# Patient Record
Sex: MALE | Race: WHITE | NOT HISPANIC OR LATINO | Employment: OTHER | URBAN - METROPOLITAN AREA
[De-identification: names, ages, dates, MRNs, and addresses within clinical notes are randomized per-mention and may not be internally consistent; named-entity substitution may affect disease eponyms.]

---

## 2023-03-05 ENCOUNTER — NURSING HOME VISIT (OUTPATIENT)
Dept: POST ACUTE CARE | Facility: EXTERNAL LOCATION | Age: 83
End: 2023-03-05
Payer: MEDICARE

## 2023-03-05 DIAGNOSIS — F33.41 RECURRENT MAJOR DEPRESSIVE DISORDER, IN PARTIAL REMISSION (CMS-HCC): ICD-10-CM

## 2023-03-05 DIAGNOSIS — G89.18 ACUTE POST-OPERATIVE PAIN: ICD-10-CM

## 2023-03-05 DIAGNOSIS — R53.81 PHYSICAL DECONDITIONING: ICD-10-CM

## 2023-03-05 DIAGNOSIS — K57.32 DIVERTICULITIS OF COLON: ICD-10-CM

## 2023-03-05 DIAGNOSIS — K31.6 GASTROCUTANEOUS FISTULA: Primary | ICD-10-CM

## 2023-03-05 DIAGNOSIS — N17.9 AKI (ACUTE KIDNEY INJURY) (CMS-HCC): ICD-10-CM

## 2023-03-05 DIAGNOSIS — Z78.9 ON TOTAL PARENTERAL NUTRITION (TPN): ICD-10-CM

## 2023-03-05 DIAGNOSIS — K65.1 INTRA-ABDOMINAL ABSCESS (MULTI): ICD-10-CM

## 2023-03-05 DIAGNOSIS — R53.83 FATIGUE, UNSPECIFIED TYPE: ICD-10-CM

## 2023-03-05 DIAGNOSIS — D72.825 BANDEMIA: ICD-10-CM

## 2023-03-05 PROCEDURE — 99310 SBSQ NF CARE HIGH MDM 45: CPT | Performed by: FAMILY MEDICINE

## 2023-03-05 NOTE — LETTER
Patient: Dakota Pugh  : 1940    Encounter Date: 2023    Acute visit    Problem List Items Addressed This Visit          Nervous    Acute post-operative pain     hx of recent gastric perforation status post repair, history of diverticulitis  - c/w fentaNYL Transdermal Patch 72 Hour 12 MCG/HR  - c/w oxycodone 5 mg PO every 6 hours as needed  - c/w Tylenol as needed            Digestive    Diverticulitis of colon    Intra-abdominal abscess (CMS/HCC)     Status post IR drain placement with ABDI drain; drain care as ordered  - leukocytosis stable continue to monitor  - per chart reviewed; CT A/P with resolution/decreased in fluid collection.  - If leukocytosis persistent, will check UA; trend wbc/temps for now given patient is known case abdominal abscess and gastrocutaneous fistula  - c/w Piperacillin Sodium-Tazobactam Sodium) 3 GM EVERY 6 HOURS  -resumed home med per discharge summary reconcile medications.  - OP follow up with surgery and ID  -Patient's wife requested palliative care following for pain control. Order placed a  -Lab Test(s): cbc, cmp, crp weekly while on zosyn (Fax Results To: facility attending and Dr Vasques @ [phone]  - Continue monitor         Gastrocutaneous fistula - Primary     Status post IR drain placement with ABDI drain; drain care as ordered  - leukocytosis stable continue to monitor  - per chart reviewed; CT A/P with resolution/decreased in fluid collection.  - If leukocytosis persistent, will check UA; trend wbc/temps for now given patient is known case abdominal abscess and gastrocutaneous fistula  - c/w Piperacillin Sodium-Tazobactam Sodium) 3 GM EVERY 6 HOURS  -resumed home med per discharge summary reconcile medications.  - OP follow up with surgery and ID  -Patient's wife requested palliative care following for pain control. Order placed a  -Lab Test(s): cbc, cmp, crp weekly while on zosyn (Fax Results To: facility attending and Dr Vasques @ [phone]  - Continue  monitor            Genitourinary    CINDI (acute kidney injury) (CMS/HCC)     hx of Fluid overload and Bilateral pleural effusions  -continue PO Lasix, wean O2 TO MAINTAIN O2SAT >90%            Hematologic    Elevated WBC count     hx of recent gastric perforation status post repair, history of diverticulitis  - c/w fentaNYL Transdermal Patch 72 Hour 12 MCG/HR  - c/w oxycodone 5 mg PO every 6 hours as needed  - c/w Tylenol as needed            Other    Fatigue    Physical deconditioning     OT/PT/ST         On total parenteral nutrition (TPN)     NPO weekly labs         Recurrent major depressive disorder, in partial remission (CMS/HCC)     C/w Citalopram GDR not recommended            #Physical deconditioning  - C/W PT/OT as tolerated  #Leukocytosis and fever; 2/2 abdominal abscess on ATB therapy. Will trend cbc weekly. Tylenol as needed  #Abdominal abscesses  #Gastrocutaneous fistula  #Status post IR drain placement with ABDI drain; drain care as ordered  - leukocytosis stable continue to monitor  - per chart reviewed; CT A/P with resolution/decreased in fluid collection.  - If leukocytosis persistent, will check UA; trend wbc/temps for now given patient is known case abdominal abscess and gastrocutaneous fistula  - c/w Piperacillin Sodium-Tazobactam Sodium) 3 GM EVERY 6 HOURS  -resumed home med per discharge summary reconcile medications.  - OP follow up with surgery and ID  -Patient's wife requested palliative care following for pain control. Order placed a  -Lab Test(s): cbc, cmp, crp weekly while on zosyn (Fax Results To: facility attending and Dr Vasques @ [phone]  - Continue monitor  #Abdominal pain  - hx of recent gastric perforation status post repair, history of diverticulitis  - c/w fentaNYL Transdermal Patch 72 Hour 12 MCG/HR  - c/w oxycodone 5 mg PO every 6 hours as needed  - c/w Tylenol as needed  #GERD  - C/W Pantoprazole 40 MG po DAILY  - C/W Zofran as needed for N/V  #CINDI  - hx of Fluid overload  and Bilateral pleural effusions  -continue PO Lasix, wean O2 TO MAINTAIN O2SAT >90%  #Malnutrition  - NPO status  - Continue TPN  #moisture associated dermatitis on buttock  - c/w Triad cream (or A&D ointment) in dime thickness daily to  irritated areas.  #HLD  - c/w Atorvastatin 10 mg PO daily  #Depression  - c/w Citalopram 10 mg PO daily  -reviewed of hospital record via EMR and reconciled medication in [facility] and EMR (d/c summary). Reviewed orders, medications, records, and pertinent labs/x-rays from [facility]. Reviewed VS, BS, O2, etc as per protocol.       Chief Complaint  This is an acute visit for leucocytosis and pain    History of Present Illness  A 82-year-old man with history of recent gastric perforation status post repair, history of diverticulitis, BPH, HLD, presented from acute rehab with increasing abdominal pain, N/V, CT A/P positive for abdominal fluid collections compatible with abscesses, gastrocutaneous fistula. Patient admitted, started on IV Zosyn, IR guided drains placed-culture showed mixed anaerobes/aerobes, beta-lactamase producers. PICC line placed and started on TPN to allow for bowel rest. Repeat CT with stable/decreased fluid collections, drains in proper position. Patient d/c to  for rehab and continue TPN.  On encounter; per nursing staff reported pain   Active Problems  Problems  · Abnormal fasting glucose (790.29) (R73.01)  · Abnormality of lung on chest x-ray (793.19) (R91.8)  · Acute post-operative pain (338.18) (G89.18)  · Age-related nuclear cataract of left eye (366.16) (H25.12)  · Age-related nuclear cataract of right eye (366.16) (H25.11)  · Anemia, unspecified type (285.9) (D64.9)  · Arthritis of shoulder region, right (716.91) (M19.011)  · At risk for constipation (V49.89) (Z91.89)  · Bilateral shoulderpain (719.41) (M25.511,M25.512)  · Bladder cancer (188.9) (C67.9)  · Bladder cancer (188.9) (C67.9)  · Carpal tunnel syndrome, bilateral upper limbs (354.0) (G56.03)  · Chest pain  (786.50) (R07.9)  · 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)  · Chronic pain of left lower extremity (729.5,338.29) (M79.605,G89.29)  · Combined form of age-related cataract, 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) (H25.813)  · Cortical age-related cataract of left eye (366.15) (H25.012)  · Cortical age-related cataract of right eye (366.15) (H25.011)  · Cyst, kidney, acquired (593.2) (N28.1)  · Dermatochalasis (374.87) (H02.839)  · Diverticulitis of colon (562.11) (K57.32)  · Dizziness (780.4) (R42)  · 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)  · EKG, abnormal (794.31) (R94.31)  · Elevated alkaline phosphatase level (790.5) (R74.8)  · Elevated glucose (790.29) (R73.09)  · Encounter for prostate cancer screening (V76.44) (Z12.5)  · 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.01)  · Fatigue (780.79) (R53.83)  ·Femur fracture, left (821.00) (S72.92XA)  · Fracture of shaft of right femur with routine healing (V54.15) (S72.301D)  · Groin pain (789.09) (R10.30)  · Hip pain, bilateral (719.45) (M25.551,M25.552)  · Hyperlipidemia (272.4) (E78.5)  · Iliotibial band tendinitis of right side (728.89) (M76.31)  · Knee pain (719.46) (M25.569)  · Left shoulder pain, unspecified chronicity (719.41) (M25.512)  · Leg pain (729.5) (M79.606)  · Leg pain (729.5) (M79.606)  · Limb pain (729.5) (M79.609)  · Liver cyst (573.8) (K76.89)  · Lower back pain (724.2) (M54.5)  · Lower back pain (724.2) (M54.50)  · Lumbar neuritis (724.4) (M54.16)  · Mixed type age-related cataract, left eye (366.19) (H25.812)  · Mixed type age-related cataract, right eye (366.19) (H25.811)  · Myopia with presbyopia (367.1,367.4) (H52.10,H52.4)  · Need for prophylactic antibiotic (V58.62) (Z79.2)  · Neuropathy (355.9) (G62.9)  · Numbness and tingling of hand (782.0) (R20.0,R20.2)  · Osteoporosis (733.00) (M81.0)  · Overweight with body mass index (BMI) of 28 to 28.9 in adult (278.02,V85.24)  (E66.3,Z68.28)  · Pain of left lower extremity (729.5) (M79.605)  · Pain  of lower leg (729.5) (M79.669)  · Renal cyst (753.10) (N28.1)  · Right shoulder pain, unspecified chronicity (719.41)(M25.511)  · Shoulder arthritis (716.91) (M19.019)  · SOB (shortness of breath) (786.05) (R06.02)  · 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)  · Status post total replacement of right shoulder (V43.61) (Z96.611)  · Tubular adenoma of colon (211.3) (D12.6)  · Vitreous floaters (379.24) (H43.399)  Past Medical History  Problems  · History of hyperlipidemia (V12.29) (Z86.39)  · History of malignant neoplasm of bladder (V10.51) (Z85.51)  · History of shortness of breath (V13.89) (Z87.898)  · Resolved Date: 22 Feb 2021  · History of Myalgia and myositis (729.1)  Surgical History  Problems  · History of Bladder Surgery  · History of Shoulder replacement  · History of Tonsillectomy With Adenoidectomy  · History of Total Hip Replacement  Family History  Mother  · Family history of Breast Cancer (V16.3)  Social History  Problems  · Being A Social Drinker  · Caffeine Use  · Exercising Regularly  · Former smoker (V15.82) (Z87.891)  · Marital History - Currently   · Non-smoker (V49.89) (Z78.9)  Allergies  Medication  · No Known Drug Allergies  Recorded By: Meenu Olsen; 12/11/2013 10:25:31 AM  Vitals    9 118 / 59 mmHg Sitting r/arm  11:29 66 bpm Regular   16 Breaths/min  99.0 mg/dL  11:29 93.0 % Oxygen via Nasal Cannula  Physical Exam  Physical exam  Constitutional: Patient is asleep, resting quietly in bed but does arouse to verbal stimuli, and A&Ox 1 (can state name), afebrile, not in acute distress  Eyes: clear sclera  ENMT: mucous membranes moist  Head/Neck: neck supple, trachea midline, no JVD  Respiratory/Thorax: CTAB, no rales/rhonchi/wheezing  Cardiovascular: RRR, no rubs/murmurs/gallops  Gastrointestinal: +BS x4, soft, nt/nd/ng/nr. Drain intact  Musculoskeletal: MCLEOD  Extremities: no edema, no cellulitis  Neurological: A&O x1, sleepy but eyes open with  verbal  Psychological: Appropriate mood and behavior  Skin: warm and dry,intact  Results/Data  Complete Blood Count + Differential 01Mar2023 09:52AM Aura Coley  Test Name Result Flag Reference  White Blood Cell Count 16.1 x10E9/L H 4.4 - 11.3  Red Blood Cell Count 2.78 x10E12/L L See Below  Reference Range: 4.50 - 5.90  Hemoglobin 7.6 g/dL L See Below  Reference Range: 13.5 - 17.5  HCT 24.4 % L See Below  Reference Range: 41.0 - 52.0  MCV 88 fL 80 - 100  MCHC 31.1 g/dL L See Below  Reference Range: 32.0 - 36.0  Platelet Count 507 x10E9/L H 150 - 450  RDW-CV 14.3 % See Below  Reference Range: 11.5 - 14.5  Neutrophil % 79.3 % See Below  Reference Range: 40.0 - 80.0  % Automated Immature Gran 1.0 % H 0.0 - 0.9  Immature Granulocyte Count (IG) includes promyelocytes,  myelocytes and metamyelocytes but does notinclude bands.  Percent differential counts (%) should be interpreted in the  context of the absolute cell counts (cells/L).  Lymphocyte % 8.1 % See Below  Reference Range: 13.0 - 44.0  Monocyte % 9.1 % 2.0 - 10.0  Eosinophil % 2.1 % 0.0 - 6.0  Basophil % 0.4 % 0.0 - 2.0  Neutrophil Count 12.76 x10E9/L H See Below  Reference Range: 1.60 - 5.50  Lymphocyte Count 1.30 x10E9/L See Below  Reference Range: 0.80 - 3.00  Monocyte Count 1.46 x10E9/L H See Below  Reference Range: 0.05 - 0.80  Eosinophil Count 0.34 x10E9/L See Below  Reference Range: 0.00 - 0.40  Basophil Count 0.07 x10E9/L See Below  Reference Range: 0.00 - 0.10  Magnesium, Serum 01Mar2023 09:52AM Silvio Dc  Test Name Result Flag Reference  Magnesium, Serum 1.90 mg/dL See Below  Reference Range: 1.60 - 2.40  Renal Function Panel 01Mar2023 09:52AM Silvio Dc  Test Name Result Flag Reference  Glucose, Serum 107 mg/dL H 74 - 99  Sodium, Serum 134 mmol/L L 136 - 145  POTASSIUM 4.0 mmol/L 3.5 - 5.3  Chloride, Serum 99 mmol/L 98 - 107  Bicarbonate, Serum 27 mmol/L 21 - 32  Anion Gap, Serum 12 mmol/L 10 - 20  Blood Urea Nitrogen, Serum 33 mg/dL H 6 -  23  CREATININE 1.44 mg/dL H See Below  Reference Range: 0.50 - 1.30  GFR MALE 48 mL/min/1.73m2 A >90  CALCULATIONS OF ESTIMATED GFR ARE PERFORMED  USING THE 2021 CKD-EPI STUDY REFIT EQUATION  WITHOUT THE RACE VARIABLE FOR THE IDMS-TRACEABLE  CREATININE METHODS.  https://jasn.asnjournals.org/content/early/2021/09/22/ASN.5086693841  Calcium, Serum 8.0 mg/dL L 8.6 - 10.3  Phosphorus, Serum 3.0 mg/dL 2.5 - 4.9  The performance characteristics of phosphorus testing in  heparinized plasma have been validated by the individual   laboratory site where testing is performed. Testing  on heparinized plasma is not approved bythe FDA;  however, such approval is not necessary.  Albumin, Serum 2.2 g/dL L 3.4 - 5.0          Electronically Signed By: Elver Nuno MD   3/28/23  1:10 AM

## 2023-03-08 ENCOUNTER — NURSING HOME VISIT (OUTPATIENT)
Dept: POST ACUTE CARE | Facility: EXTERNAL LOCATION | Age: 83
End: 2023-03-08
Payer: MEDICARE

## 2023-03-08 DIAGNOSIS — D72.825 BANDEMIA: ICD-10-CM

## 2023-03-08 DIAGNOSIS — K65.1 INTRA-ABDOMINAL ABSCESS (MULTI): Primary | ICD-10-CM

## 2023-03-08 DIAGNOSIS — F33.41 RECURRENT MAJOR DEPRESSIVE DISORDER, IN PARTIAL REMISSION (CMS-HCC): ICD-10-CM

## 2023-03-08 DIAGNOSIS — K31.6 GASTROCUTANEOUS FISTULA: ICD-10-CM

## 2023-03-08 DIAGNOSIS — N17.9 AKI (ACUTE KIDNEY INJURY) (CMS-HCC): ICD-10-CM

## 2023-03-08 DIAGNOSIS — Z78.9 ON TOTAL PARENTERAL NUTRITION (TPN): ICD-10-CM

## 2023-03-08 DIAGNOSIS — R53.81 PHYSICAL DECONDITIONING: ICD-10-CM

## 2023-03-08 DIAGNOSIS — G89.18 ACUTE POST-OPERATIVE PAIN: ICD-10-CM

## 2023-03-08 PROCEDURE — 99309 SBSQ NF CARE MODERATE MDM 30: CPT | Performed by: FAMILY MEDICINE

## 2023-03-12 ENCOUNTER — NURSING HOME VISIT (OUTPATIENT)
Dept: POST ACUTE CARE | Facility: EXTERNAL LOCATION | Age: 83
End: 2023-03-12
Payer: MEDICARE

## 2023-03-12 DIAGNOSIS — N17.9 AKI (ACUTE KIDNEY INJURY) (CMS-HCC): ICD-10-CM

## 2023-03-12 DIAGNOSIS — F33.41 RECURRENT MAJOR DEPRESSIVE DISORDER, IN PARTIAL REMISSION (CMS-HCC): ICD-10-CM

## 2023-03-12 DIAGNOSIS — G89.18 ACUTE POST-OPERATIVE PAIN: Primary | ICD-10-CM

## 2023-03-12 DIAGNOSIS — Z78.9 ON TOTAL PARENTERAL NUTRITION (TPN): ICD-10-CM

## 2023-03-12 DIAGNOSIS — D72.825 BANDEMIA: ICD-10-CM

## 2023-03-12 DIAGNOSIS — K31.6 GASTROCUTANEOUS FISTULA: ICD-10-CM

## 2023-03-12 DIAGNOSIS — K65.1 INTRA-ABDOMINAL ABSCESS (MULTI): ICD-10-CM

## 2023-03-12 DIAGNOSIS — R53.81 PHYSICAL DECONDITIONING: ICD-10-CM

## 2023-03-12 DIAGNOSIS — K57.32 DIVERTICULITIS OF COLON: ICD-10-CM

## 2023-03-12 PROCEDURE — 99310 SBSQ NF CARE HIGH MDM 45: CPT | Performed by: FAMILY MEDICINE

## 2023-03-12 NOTE — LETTER
Patient: Dakota Pugh  : 1940    Encounter Date: 2023    Acute visit    Problem List Items Addressed This Visit          Nervous    Acute post-operative pain - Primary      hx of recent gastric perforation status post repair, history of diverticulitis  - c/w fentaNYL Transdermal Patch 72 Hour 12 MCG/HR  - c/w oxycodone 5 mg PO every 6 hours as needed            Digestive    Diverticulitis of colon    Intra-abdominal abscess (CMS/HCC)     Status post IR drain placement with ABDI drain; drain care as ordered  - leukocytosis stable continue to monitor  - per chart reviewed; CT A/P with resolution/decreased in fluid collection.  - If leukocytosis persistent, will check UA; trend wbc/temps for now given patient is known case abdominal abscess and gastrocutaneous fistula  - c/w Piperacillin Sodium-Tazobactam Sodium) 3 GM EVERY 6 HOURS  -resumed home med per discharge summary reconcile medications.  - OP follow up with surgery and ID  -Patient's wife requested palliative care following for pain control. Order placed a  -Lab Test(s): cbc, cmp, crp weekly while on zosyn (Fax Results To: facility attending and Dr Vasques @ [phone]  - Continue monitor         Gastrocutaneous fistula     Status post IR drain placement with ABDI drain; drain care as ordered  - leukocytosis stable continue to monitor  - per chart reviewed; CT A/P with resolution/decreased in fluid collection.  - If leukocytosis persistent, will check UA; trend wbc/temps for now given patient is known case abdominal abscess and gastrocutaneous fistula  - c/w Piperacillin Sodium-Tazobactam Sodium) 3 GM EVERY 6 HOURS  -resumed home med per discharge summary reconcile medications.  - OP follow up with surgery and ID  -Patient's wife requested palliative care following for pain control. Order placed a  -Lab Test(s): cbc, cmp, crp weekly while on zosyn (Fax Results To: facility attending and Dr Vasques @ [phone]  - Continue monitor             Genitourinary    CINDI (acute kidney injury) (CMS/[facility] [place])     x of Fluid overload and Bilateral pleural effusions  -continue PO Lasix, wean O2 TO MAINTAIN O2SAT >90%            Hematologic    Elevated WBC count     leukocytosis stable continue to monitor  - per chart reviewed; CT A/P with resolution/decreased in fluid collection.  - If leukocytosis persistent, will check UA; trend wbc/temps for now given patient is known case abdominal abscess and gastrocutaneous fistula  - c/w Piperacillin Sodium-Tazobactam Sodium) 3 GM EVERY 6 HOURS  -resumed home med per discharge summary reconcile medications.  - OP follow up with surgery and ID            Other    Physical deconditioning     OT/PT         On total parenteral nutrition (TPN)     C/w NPO TPN         Recurrent major depressive disorder, in partial remission (CMS/[facility] [place])     C/w citalopram                  #Physical deconditioning  - C/W PT/OT as tolerated  #Leukocytosis and fever; 2/2 abdominal abscess on ATB therapy. Will trend cbc weekly. Tylenol as needed  #Abdominal abscesses  #Gastrocutaneous fistula  #Status post IR drain placement with ABDI drain; drain care as ordered  - leukocytosis stable continue to monitor  - per chart reviewed; CT A/P with resolution/decreased in fluid collection.  - If leukocytosis persistent, will check UA; trend wbc/temps for now given patient is known case abdominal abscess and gastrocutaneous fistula  - c/w Piperacillin Sodium-Tazobactam Sodium) 3 GM EVERY 6 HOURS  -resumed home med per discharge summary reconcile medications.  - OP follow up with surgery and ID  -Patient's wife requested palliative care following for pain control. Order placed a  -Lab Test(s): cbc, cmp, crp weekly while on zosyn (Fax Results To: facility attending and Dr Vasques @ [phone]  - Continue monitor  #Abdominal pain  - hx of recent gastric perforation status post repair, history of diverticulitis  - c/w fentaNYL Transdermal Patch 72 Hour 12 MCG/HR  - c/w  oxycodone 5 mg PO every 6 hours as needed  - c/w Tylenol as needed  #GERD  - C/W Pantoprazole 40 MG po DAILY  - C/W Zofran as needed for N/V  #CINDI  - hx of Fluid overload and Bilateral pleural effusions  -continue PO Lasix, wean O2 TO MAINTAIN O2SAT >90%  #Malnutrition  - NPO status  - Continue TPN  #moisture associated dermatitis on buttock  - c/w Triad cream (or A&D ointment) in dime thickness daily to  irritated areas.  #HLD  - c/w Atorvastatin 10 mg PO daily  #Depression  - c/w Citalopram 10 mg PO daily  -reviewed of hospital record via EMR and reconciled medication in PCC and EMR (d/c summary). Reviewed orders, medications, records, and pertinent labs/x-rays from PCC. Reviewed VS, BS, O2, etc as per protocol.       Chief Complaint  This is an acute visit for leucocytosis and pain    History of Present Illness  A 82-year-old man with history of recent gastric perforation status post repair, history of diverticulitis, BPH, HLD, presented from acute rehab with increasing abdominal pain, N/V, CT A/P positive for abdominal fluid collections compatible with abscesses, gastrocutaneous fistula. Patient admitted, started on IV Zosyn, IR guided drains placed-culture showed mixed anaerobes/aerobes, beta-lactamase producers. PICC line placed and started on TPN to allow for bowel rest. Repeat CT with stable/decreased fluid collections, drains in proper position. Patient d/c to  for rehab and continue TPN.  On encounter; per nursing staff reported pain   Active Problems  Problems  · Abnormal fasting glucose (790.29) (R73.01)  · Abnormality of lung on chest x-ray (793.19) (R91.8)  · Acute post-operative pain (338.18) (G89.18)  · Age-related nuclear cataract of left eye (366.16) (H25.12)  · Age-related nuclear cataract of right eye (366.16) (H25.11)  · Anemia, unspecified type (285.9) (D64.9)  · Arthritis of shoulder region, right (716.91) (M19.011)  · At risk for constipation (V49.89) (Z91.89)  · Bilateral shoulderpain  (719.41) (M25.511,M25.512)  · Bladder cancer (188.9) (C67.9)  · Bladder cancer (188.9) (C67.9)  · Carpal tunnel syndrome, bilateral upper limbs (354.0) (G56.03)  · Chest pain (786.50) (R07.9)  · 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)  · Chronic pain of left lower extremity (729.5,338.29) (M79.605,G89.29)  · Combined form of age-related cataract, 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) (H25.813)  · Cortical age-related cataract of left eye (366.15) (H25.012)  · Cortical age-related cataract of right eye (366.15) (H25.011)  · Cyst, kidney, acquired (593.2) (N28.1)  · Dermatochalasis (374.87) (H02.839)  · Diverticulitis of colon (562.11) (K57.32)  · Dizziness (780.4) (R42)  · 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)  · EKG, abnormal (794.31) (R94.31)  · Elevated alkaline phosphatase level (790.5) (R74.8)  · Elevated glucose (790.29) (R73.09)  · Encounter for prostate cancer screening (V76.44) (Z12.5)  · 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.01)  · Fatigue (780.79) (R53.83)  ·Femur fracture, left (821.00) (S72.92XA)  · Fracture of shaft of right femur with routine healing (V54.15) (S72.301D)  · Groin pain (789.09) (R10.30)  · Hip pain, bilateral (719.45) (M25.551,M25.552)  · Hyperlipidemia (272.4) (E78.5)  · Iliotibial band tendinitis of right side (728.89) (M76.31)  · Knee pain (719.46) (M25.569)  · Left shoulder pain, unspecified chronicity (719.41) (M25.512)  · Leg pain (729.5) (M79.606)  · Leg pain (729.5) (M79.606)  · Limb pain (729.5) (M79.609)  · Liver cyst (573.8) (K76.89)  · Lower back pain (724.2) (M54.5)  · Lower back pain (724.2) (M54.50)  · Lumbar neuritis (724.4) (M54.16)  · Mixed type age-related cataract, left eye (366.19) (H25.812)  · Mixed type age-related cataract, right eye (366.19) (H25.811)  · Myopia with presbyopia (367.1,367.4) (H52.10,H52.4)  · Need for prophylactic antibiotic (V58.62) (Z79.2)  · Neuropathy (355.9) (G62.9)  · Numbness and tingling of hand (782.0) (R20.0,R20.2)  ·  Osteoporosis (733.00) (M81.0)  · Overweight with body mass index (BMI) of 28 to 28.9 in adult (278.02,V85.24)  (E66.3,Z68.28)  · Pain of left lower extremity (729.5) (M79.605)  · Pain of lower leg (729.5) (M79.669)  · Renal cyst (753.10) (N28.1)  · Right shoulder pain, unspecified chronicity (719.41)(M25.511)  · Shoulder arthritis (716.91) (M19.019)  · SOB (shortness of breath) (786.05) (R06.02)  · 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)  · Status post total replacement of right shoulder (V43.61) (Z96.611)  · Tubular adenoma of colon (211.3) (D12.6)  · Vitreous floaters (379.24) (H43.399)  Past Medical History  Problems  · History of hyperlipidemia (V12.29) (Z86.39)  · History of malignant neoplasm of bladder (V10.51) (Z85.51)  · History of shortness of breath (V13.89) (Z87.898)  · Resolved Date: 22 Feb 2021  · History of Myalgia and myositis (729.1)  Surgical History  Problems  · History of Bladder Surgery  · History of Shoulder replacement  · History of Tonsillectomy With Adenoidectomy  · History of Total Hip Replacement  Family History  Mother  · Family history of Breast Cancer (V16.3)  Social History  Problems  · Being A Social Drinker  · Caffeine Use  · Exercising Regularly  · Former smoker (V15.82) (Z87.891)  · Marital History - Currently   · Non-smoker (V49.89) (Z78.9)  Allergies  Medication  · No Known Drug Allergies  Recorded By: Meenu Olsen; 12/11/2013 10:25:31 AM  Vitals    9 118 / 59 mmHg Sitting r/arm  11:29 66 bpm Regular   16 Breaths/min  99.0 mg/dL  11:29 93.0 % Oxygen via Nasal Cannula  Physical Exam  Physical exam  Constitutional: Patient is asleep, resting quietly in bed but does arouse to verbal stimuli, and A&Ox 1 (can state name), afebrile, not in acute distress  Eyes: clear sclera  ENMT: mucous membranes moist  Head/Neck: neck supple, trachea midline, no JVD  Respiratory/Thorax: CTAB, no rales/rhonchi/wheezing  Cardiovascular: RRR, no  rubs/murmurs/gallops  Gastrointestinal: +BS x4, soft, nt/nd/ng/nr. Drain intact  Musculoskeletal: MCLEOD  Extremities: no edema, no cellulitis  Neurological: A&O x1, sleepy but eyes open with verbal  Psychological: Appropriate mood and behavior  Skin: warm and dry,intact  Results/Data  Complete Blood Count + Differential 01Mar2023 09:52AM Aura Coley  Test Name Result Flag Reference  White Blood Cell Count 16.1 x10E9/L H 4.4 - 11.3  Red Blood Cell Count 2.78 x10E12/L L See Below  Reference Range: 4.50 - 5.90  Hemoglobin 7.6 g/dL L See Below  Reference Range: 13.5 - 17.5  HCT 24.4 % L See Below  Reference Range: 41.0 - 52.0  MCV 88 fL 80 - 100  MCHC 31.1 g/dL L See Below  Reference Range: 32.0 - 36.0  Platelet Count 507 x10E9/L H 150 - 450  RDW-CV 14.3 % See Below  Reference Range: 11.5 - 14.5  Neutrophil % 79.3 % See Below  Reference Range: 40.0 - 80.0  % Automated Immature Gran 1.0 % H 0.0 - 0.9  Immature Granulocyte Count (IG) includes promyelocytes,  myelocytes and metamyelocytes but does notinclude bands.  Percent differential counts (%) should be interpreted in the  context of the absolute cell counts (cells/L).  Lymphocyte % 8.1 % See Below  Reference Range: 13.0 - 44.0  Monocyte % 9.1 % 2.0 - 10.0  Eosinophil % 2.1 % 0.0 - 6.0  Basophil % 0.4 % 0.0 - 2.0  Neutrophil Count 12.76 x10E9/L H See Below  Reference Range: 1.60 - 5.50  Lymphocyte Count 1.30 x10E9/L See Below  Reference Range: 0.80 - 3.00  Monocyte Count 1.46 x10E9/L H See Below  Reference Range: 0.05 - 0.80  Eosinophil Count 0.34 x10E9/L See Below  Reference Range: 0.00 - 0.40  Basophil Count 0.07 x10E9/L See Below  Reference Range: 0.00 - 0.10  Magnesium, Serum 01Mar2023 09:52AM Silvio Dc  Test Name Result Flag Reference  Magnesium, Serum 1.90 mg/dL See Below  Reference Range: 1.60 - 2.40  Renal Function Panel 01Mar2023 09:52AM Silvio Dc  Test Name Result Flag Reference  Glucose, Serum 107 mg/dL H 74 - 99  Sodium, Serum 134 mmol/L L 136 -  145  POTASSIUM 4.0 mmol/L 3.5 - 5.3  Chloride, Serum 99 mmol/L 98 - 107  Bicarbonate, Serum 27 mmol/L 21 - 32  Anion Gap, Serum 12 mmol/L 10 - 20  Blood Urea Nitrogen, Serum 33 mg/dL H 6 - 23  CREATININE 1.44 mg/dL H See Below  Reference Range: 0.50 - 1.30  GFR MALE 48 mL/min/1.73m2 A >90  CALCULATIONS OF ESTIMATED GFR ARE PERFORMED  USING THE 2021 CKD-EPI STUDY REFIT EQUATION  WITHOUT THE RACE VARIABLE FOR THE IDMS-TRACEABLE  CREATININE METHODS.  https://jasn.asnjournals.org/content/early/2021/09/22/ASN.3663222894  Calcium, Serum 8.0 mg/dL L 8.6 - 10.3  Phosphorus, Serum 3.0 mg/dL 2.5 - 4.9  The performance characteristics of phosphorus testing in  heparinized plasma have been validated by the individual   laboratory site where testing is performed. Testing  on heparinized plasma is not approved bythe FDA;  however, such approval is not necessary.  Albumin, Serum 2.2 g/dL L 3.4 - 5.0        Electronically Signed By: Elver Nuno MD   3/28/23  1:32 AM

## 2023-03-24 ENCOUNTER — NURSING HOME VISIT (OUTPATIENT)
Dept: POST ACUTE CARE | Facility: EXTERNAL LOCATION | Age: 83
End: 2023-03-24
Payer: MEDICARE

## 2023-03-24 DIAGNOSIS — R53.81 PHYSICAL DECONDITIONING: Primary | ICD-10-CM

## 2023-03-24 DIAGNOSIS — E43 SEVERE MALNUTRITION (MULTI): ICD-10-CM

## 2023-03-24 DIAGNOSIS — D63.8 ANEMIA OF CHRONIC DISEASE: ICD-10-CM

## 2023-03-24 DIAGNOSIS — E78.5 HYPERLIPIDEMIA, UNSPECIFIED HYPERLIPIDEMIA TYPE: ICD-10-CM

## 2023-03-24 DIAGNOSIS — K65.1 INTRA-ABDOMINAL ABSCESS (MULTI): ICD-10-CM

## 2023-03-24 DIAGNOSIS — Z01.89 LABORATORY EXAMINATION: ICD-10-CM

## 2023-03-24 DIAGNOSIS — Z79.899 ENCOUNTER FOR MEDICATION REVIEW: ICD-10-CM

## 2023-03-24 DIAGNOSIS — F32.A DEPRESSION, UNSPECIFIED DEPRESSION TYPE: ICD-10-CM

## 2023-03-24 DIAGNOSIS — K21.9 GASTROESOPHAGEAL REFLUX DISEASE WITHOUT ESOPHAGITIS: ICD-10-CM

## 2023-03-24 DIAGNOSIS — R10.9 ABDOMINAL PAIN, UNSPECIFIED ABDOMINAL LOCATION: ICD-10-CM

## 2023-03-24 PROCEDURE — 99310 SBSQ NF CARE HIGH MDM 45: CPT | Performed by: NURSE PRACTITIONER

## 2023-03-24 NOTE — LETTER
Patient: Dakota Pugh  : 1940    Encounter Date: 2023    Late entry;     Subjective  Patient ID: Dakota Pugh is a 83 y.o. male who is acute skilled care and presents for initial visit for skilled nursing.    HPI   A 82-year-old man with history of recent gastric perforation status post repair, history of diverticulitis, BPH, HLD, presented from acute rehab with increasing abdominal pain, N/V, CT A/P positive for abdominal fluid collections compatible with abscesses, gastrocutaneous fistula. Patient admitted, started on IV Zosyn, IR guided drains placed-culture showed mixed anaerobes/aerobes, beta-lactamase producers. PICC line placed and started on TPN to allow for bowel rest. Repeat CT with stable/decreased fluid collections, drains in proper position. Patient d/c to  for rehab and continue TPN. Patient was admitted to  during 3/2/23-3/14/23. During rehab stay patient found to have anemia with significantly dropped to 6.6 and required blood transfusion due to patient symptomatic fatigue and weakness. Infectious disease rec' for further work-up.  CT abdomen negative for intraabdominal hemorrhage but showed decreased in size of intraabdominal abscess.  Patient's drain was removed but rec' continue ATB. He received blood transfusion and impressed r/t chronic disease. TPN also discontinued and patient tolerate PO diet. He discharged back to  on 3/24/23 to continue rehab and ATB.     On encounter; patient reported feeling better. Pain appropriate controlled with current pain med. Tolerate PO diet. See ROS.   Review of Systems   Constitutional:  Negative for chills, fatigue and fever.   Respiratory:  Negative for cough, shortness of breath and wheezing.    Cardiovascular:  Negative for chest pain, palpitations and leg swelling.   Gastrointestinal:  Negative for abdominal distention, abdominal pain, constipation, nausea and vomiting.   Genitourinary:  Negative for dysuria.   Musculoskeletal:   Negative for joint swelling.   Skin:  Negative for color change.   Neurological:  Negative for dizziness and headaches.   Psychiatric/Behavioral:  Negative for agitation and confusion.        Objective  Vitals: T 96.7 - 3/24/2023 19:21 Route: Forehead (non-contact)  /68 - 3/24/2023 17:02 Position: Sitting r/arm  P 67 - 3/24/2023 17:02 Pulse Type: Regular  R 18.0 - 3/24/2023 17:02  O2 92.0 % - 3/24/2023 17:02 Method:Room Air  .0 - 3/24/2023 17:42    Physical Exam  Constitutional:       General: He is not in acute distress.     Appearance: Normal appearance.   HENT:      Head: Normocephalic and atraumatic.      Nose: No congestion or rhinorrhea.      Mouth/Throat:      Mouth: Mucous membranes are moist.   Eyes:      General: No scleral icterus.     Extraocular Movements: Extraocular movements intact.      Conjunctiva/sclera: Conjunctivae normal.   Cardiovascular:      Rate and Rhythm: Normal rate and regular rhythm.      Heart sounds: No murmur heard.  Pulmonary:      Effort: Pulmonary effort is normal. No respiratory distress.      Breath sounds: Normal breath sounds. No wheezing, rhonchi or rales.   Abdominal:      General: Bowel sounds are normal. There is no distension.      Palpations: Abdomen is soft.      Tenderness: There is no abdominal tenderness. There is no rebound.   Musculoskeletal:         General: No swelling. Normal range of motion.      Right lower leg: No edema.      Left lower leg: No edema.   Skin:     General: Skin is warm and dry.   Neurological:      Mental Status: He is alert and oriented to person, place, and time. Mental status is at baseline.   Psychiatric:         Mood and Affect: Mood normal.         Behavior: Behavior normal.        Lab Results   Component Value Date    WBC 7.7 03/24/2023    HGB 7.9 (L) 03/24/2023    HCT 24.9 (L) 03/24/2023     03/24/2023    CHOL 193 05/18/2022    TRIG 180 (H) 03/17/2023    HDL 56.4 05/18/2022    ALT 44 03/24/2023    AST 61 (H)  03/24/2023     (L) 03/24/2023    K 3.9 03/24/2023    CL 99 03/24/2023    CREATININE 1.31 (H) 03/24/2023    BUN 35 (H) 03/24/2023    CO2 29 03/24/2023    TSH 2.02 02/02/2023    PSA 0.23 12/20/2017    INR 1.5 (H) 03/14/2023    HGBA1C 5.4 05/18/2022       Assessment/Plan    #Physical deconditioning   - C/W PT/OT as tolerate   #Anemia of chronic disease  - s/p blood transfusion  - current Hgb 7.9 on 3/24/23  - continue monitor weekly cbc  #Intra-abdominal abscess  - s/p antibiotics and drain removed   - continue Zosyn until 3/28/23  - per chart reviewed; CT A/P showed decreased size of abscces and drain was removed    - c/w Piperacillin Sodium-Tazobactam Sodium) 3 GM EVERY 6 HOURS. Stop date on 3/28/23       #Abdominal pain   - hx of recent gastric perforation status post repair, history of diverticulitis  - c/w fentaNYL Transdermal Patch 72 Hour 12 MCG/HR  - c/w oxycodone 2.5 mg PO every 6 hours as needed   - c/w Tylenol as needed   #GERD  - C/W Pantoprazole 40 MG po DAILY  - C/W Zofran as needed for N/V  #severe Malnutrition  -- continue regular diet  - dietitian to follow up  #HLD  - c/w Atorvastatin 10 mg PO daily  #Depression  - c/w Citalopram 10 mg PO daily   #Med reviewed  #Lab reviewed     Time spending 45 minutes   -reviewed of hospital record via EMR and reconciled medication in PCC and EMR (d/c summary). Reviewed orders, medications, records, and pertinent labs/x-rays from PCC. Reviewed VS, BS, O2, etc as per protocol. Reviewed and signed off orders, medications, Labs, x-rays, and current diagnoses in PCC  -Obtained history  -Performing physical examination  -Counseling and educating patient's wife for above POC and pain management   -Ordering medications, lab   -Documenting clinical information in the  [facility]   -Care coordinating with nursing staff          Electronically Signed By: JOSÉ ANTONIO Chapman-CNP   4/3/23 12:31 AM

## 2023-03-28 ENCOUNTER — NURSING HOME VISIT (OUTPATIENT)
Dept: POST ACUTE CARE | Facility: EXTERNAL LOCATION | Age: 83
End: 2023-03-28
Payer: MEDICARE

## 2023-03-28 DIAGNOSIS — Z79.899 ENCOUNTER FOR MEDICATION REVIEW: ICD-10-CM

## 2023-03-28 DIAGNOSIS — R42 ORTHOSTATIC DIZZINESS: ICD-10-CM

## 2023-03-28 DIAGNOSIS — R53.81 PHYSICAL DECONDITIONING: ICD-10-CM

## 2023-03-28 DIAGNOSIS — R10.9 ABDOMINAL PAIN, UNSPECIFIED ABDOMINAL LOCATION: ICD-10-CM

## 2023-03-28 DIAGNOSIS — D63.8 ANEMIA OF CHRONIC DISEASE: ICD-10-CM

## 2023-03-28 DIAGNOSIS — K65.1 INTRA-ABDOMINAL ABSCESS (MULTI): ICD-10-CM

## 2023-03-28 DIAGNOSIS — Z01.89 LABORATORY EXAMINATION: ICD-10-CM

## 2023-03-28 DIAGNOSIS — I95.1 ORTHOSTATIC HYPOTENSION: Primary | ICD-10-CM

## 2023-03-28 PROBLEM — D72.829 ELEVATED WBC COUNT: Status: ACTIVE | Noted: 2023-03-28

## 2023-03-28 PROBLEM — G89.18 ACUTE POST-OPERATIVE PAIN: Status: ACTIVE | Noted: 2023-03-28

## 2023-03-28 PROBLEM — K57.32 DIVERTICULITIS OF COLON: Status: ACTIVE | Noted: 2023-03-28

## 2023-03-28 PROBLEM — R53.83 FATIGUE: Status: ACTIVE | Noted: 2023-03-28

## 2023-03-28 PROBLEM — D64.9 ANEMIA: Status: ACTIVE | Noted: 2023-03-28

## 2023-03-28 PROBLEM — K31.6 GASTROCUTANEOUS FISTULA: Status: ACTIVE | Noted: 2023-03-28

## 2023-03-28 PROBLEM — F33.41 RECURRENT MAJOR DEPRESSIVE DISORDER, IN PARTIAL REMISSION (CMS-HCC): Status: ACTIVE | Noted: 2023-03-28

## 2023-03-28 PROBLEM — N17.9 AKI (ACUTE KIDNEY INJURY) (CMS-HCC): Status: ACTIVE | Noted: 2023-03-28

## 2023-03-28 PROBLEM — Z78.9 ON TOTAL PARENTERAL NUTRITION (TPN): Status: ACTIVE | Noted: 2023-03-28

## 2023-03-28 PROCEDURE — 99309 SBSQ NF CARE MODERATE MDM 30: CPT | Performed by: NURSE PRACTITIONER

## 2023-03-28 NOTE — ASSESSMENT & PLAN NOTE
leukocytosis stable continue to monitor  - per chart reviewed; CT A/P with resolution/decreased in fluid collection.  - If leukocytosis persistent, will check UA; trend wbc/temps for now given patient is known case abdominal abscess and gastrocutaneous fistula  - c/w Piperacillin Sodium-Tazobactam Sodium) 3 GM EVERY 6 HOURS  -resumed home med per discharge summary reconcile medications.  - OP follow up with surgery and ID

## 2023-03-28 NOTE — ASSESSMENT & PLAN NOTE
Status post IR drain placement with ABDI drain; drain care as ordered  - leukocytosis stable continue to monitor  - per chart reviewed; CT A/P with resolution/decreased in fluid collection.  - If leukocytosis persistent, will check UA; trend wbc/temps for now given patient is known case abdominal abscess and gastrocutaneous fistula  - c/w Piperacillin Sodium-Tazobactam Sodium) 3 GM EVERY 6 HOURS  -resumed home med per discharge summary reconcile medications.  - OP follow up with surgery and ID  -Patient's wife requested palliative care following for pain control. Order placed a  -Lab Test(s): cbc, cmp, crp weekly while on zosyn (Fax Results To: facility attending and Dr Vasques @ [phone]  - Continue monitor

## 2023-03-28 NOTE — ASSESSMENT & PLAN NOTE
Arpit Raphael is a 61 year old male presenting with 2 week follow up .    Medication verified, no changes  Patient would like communication of their results via: [employer]    Cell Phone:   Telephone Information:   Mobile [phone]     Okay to leave a message containing results? Yes    Health Maintenance Due   Topic Date Due   • Shingles Vaccine (1 of 2) Never done   • DTaP/Tdap/Td Vaccine (2 - Td or Tdap) 05/07/2019     Patient is due for topics as listed above but is not proceeding with Immunization(s) Dtap/Tdap/Td and Shingles at this time.     MD to discuss  HM topics with pt .       Status post IR drain placement with ABDI drain; drain care as ordered  - leukocytosis stable continue to monitor  - per chart reviewed; CT A/P with resolution/decreased in fluid collection.  - If leukocytosis persistent, will check UA; trend wbc/temps for now given patient is known case abdominal abscess and gastrocutaneous fistula  - c/w Piperacillin Sodium-Tazobactam Sodium) 3 GM EVERY 6 HOURS  -resumed home med per discharge summary reconcile medications.  - OP follow up with surgery and ID  -Patient's wife requested palliative care following for pain control. Order placed a  -Lab Test(s): cbc, cmp, crp weekly while on zosyn (Fax Results To: facility attending and Dr Vasques @ [phone]  - Continue monitor

## 2023-03-28 NOTE — ASSESSMENT & PLAN NOTE
Status post IR drain placement with ABDI drain; drain care as ordered  - leukocytosis stable continue to monitor  - per chart reviewed; CT A/P with resolution/decreased in fluid collection.  - If leukocytosis persistent, will check UA; trend wbc/temps for now given patient is known case abdominal abscess and gastrocutaneous fistula  - c/w Piperacillin Sodium-Tazobactam Sodium) 3 GM EVERY 6 HOURS  -resumed home med per discharge summary reconcile medications.  - OP follow up with surgery and ID  -Patient's wife requested palliative care following for pain control. Order placed a  -Lab Test(s): cbc, cmp, crp weekly while on zosyn (Fax Results To: facility attending and Dr Vasques @ [phone]  - Continue monitor

## 2023-03-28 NOTE — ASSESSMENT & PLAN NOTE
Status post IR drain placement with ABDI drain; drain care as ordered  - leukocytosis stable continue to monitor  - per chart reviewed; CT A/P with resolution/decreased in fluid collection.  - If leukocytosis persistent, will check UA; trend wbc/temps for now given patient is known case abdominal abscess and gastrocutaneous fistula  - c/w Piperacillin Sodium-Tazobactam Sodium) 3 GM EVERY 6 HOURS  -resumed home med per discharge summary reconcile medications.  - OP follow up with surgery and ID  -Patient's wife requested palliative care following for pain control. Order placed a  -Lab Test(s): cbc, cmp, crp weekly while on zosyn (Fax Results To: facility attending and Dr Vasques @ [phone]  - Continue monitor

## 2023-03-28 NOTE — ASSESSMENT & PLAN NOTE
hx of recent gastric perforation status post repair, history of diverticulitis  - c/w fentaNYL Transdermal Patch 72 Hour 12 MCG/HR  - c/w oxycodone 5 mg PO every 6 hours as needed  - c/w Tylenol as needed

## 2023-03-28 NOTE — LETTER
"Patient: Dakota Pugh  : 1940    Encounter Date: 2023    Late entry;     Subjective  Patient ID: Dakota Pugh is a 83 y.o. male who is acute skilled care and seen for new acute hypotension and orthostatic hypotension and dizziness.    HPI   A 82-year-old man with history of recent gastric perforation status post repair, history of diverticulitis, BPH, HLD, presented from acute rehab with increasing abdominal pain, N/V, CT A/P positive for abdominal fluid collections compatible with abscesses, gastrocutaneous fistula. Patient admitted, started on IV Zosyn, IR guided drains placed-culture showed mixed anaerobes/aerobes, beta-lactamase producers. PICC line placed and started on TPN to allow for bowel rest. Repeat CT with stable/decreased fluid collections, drains in proper position. Patient d/c to  for rehab and continue TPN. Patient was admitted to  during 3/2/23-3/14/23. During rehab stay patient found to have anemia with significantly dropped to 6.6 and required blood transfusion due to patient symptomatic fatigue and weakness. Infectious disease rec' for further work-up.  CT abdomen negative for intraabdominal hemorrhage but showed decreased in size of intraabdominal abscess.  Patient's drain was removed but rec' continue ATB. He received blood transfusion and impressed r/t chronic disease. TPN also discontinued and patient tolerate PO diet. He discharged back to  on 3/24/23 to continue rehab and ATB.     Per nursing staff reported Hypotensive episode in therapy this shift. BP sitting 79/53 pulse 72, lying 105/55, pulse 74. New order to encourage fluids, compression stockings, and Orthostatic BPs for the next 3 days. Wife and \"R\" both made aware, no issues noted  See ROS.   Review of Systems   Constitutional:  Negative for chills, fatigue and fever.   Respiratory:  Negative for cough, shortness of breath and wheezing.    Cardiovascular:  Negative for chest pain, palpitations and leg swelling. "   Gastrointestinal:  Negative for abdominal distention, abdominal pain, constipation, nausea and vomiting.   Genitourinary:  Negative for dysuria.   Musculoskeletal:  Negative for joint swelling.   Skin:  Negative for color change.   Neurological:  Positive for dizziness and light-headedness. Negative for headaches.   Psychiatric/Behavioral:  Negative for agitation and confusion.        Objective  Vitals: T 97.7 - 3/28/2023 14:23 Route: Forehead (non-contact)  /57 - 3/28/2023 14:23 Position: Sitting r/arm  P 63 - 3/28/2023 14:23 Pulse Type: Regular  Character: Normal.  R 18 - 3/28/2023 14:23  O2 98 %- 3/28/2023 14:23 Method: Room Air  BS 92.0 - 3/28/2023 12:31  W 172.8 lb - 3/27/2023 13:42 Scale: Sitting    Physical Exam  Constitutional:       General: He is not in acute distress.     Appearance: Normal appearance.   HENT:      Head: Normocephalic and atraumatic.      Nose: No congestion or rhinorrhea.      Mouth/Throat:      Mouth: Mucous membranes are moist.   Eyes:      General: No scleral icterus.     Extraocular Movements: Extraocular movements intact.      Conjunctiva/sclera: Conjunctivae normal.   Cardiovascular:      Rate and Rhythm: Normal rate and regular rhythm.      Heart sounds: No murmur heard.  Pulmonary:      Effort: Pulmonary effort is normal. No respiratory distress.      Breath sounds: Normal breath sounds. No wheezing, rhonchi or rales.   Abdominal:      General: Bowel sounds are normal. There is no distension.      Palpations: Abdomen is soft.      Tenderness: There is no abdominal tenderness. There is no rebound.   Musculoskeletal:         General: No swelling. Normal range of motion.      Right lower leg: No edema.      Left lower leg: No edema.   Skin:     General: Skin is warm and dry.   Neurological:      Mental Status: He is alert and oriented to person, place, and time. Mental status is at baseline.   Psychiatric:         Mood and Affect: Mood normal.         Behavior: Behavior  normal.      Labs on 3/27/23   C-Reactive Protein  5.6 mg/dL <0.9 H Final       CBC       White Blood Cell Count  6.87 k/uL 3.70-11.00  Final           RBC  3.44 m/uL 4.20-6.00 L Final           Hemoglobin  9.7 g/dL 13.0-17.0 L Final           Hematocrit  31.1 % 39.0-51.0 L Final           MCV  90.4 fL 80.0-100.0  Final           MCH  28.2 pg 26.0-34.0  Final           MCHC  31.2 g/dL 30.5-36.0  Final           RDW-CV  17.8 % 11.5-15.0 H Final           Platelet Count  348 k/uL 150-400  Final           MPV  9.2 fL 9.0-12.7  Final           Absolute nRBC  <0.01 k/uL <0.01  Final       Comp Metabolic Panel       Protein, Total  7.4 g/dL 6.3-8.0  Final           Albumin  3.0 g/dL 3.9-4.9 L Final           Calcium, Total  10.6 mg/dL 8.5-10.2 H Final           Bilirubin, Total  0.3 mg/dL 0.2-1.3  Final           Alkaline Phosphatase  424 U/L  H Final           AST  See Below    Final      Unable to assay due to interference from hemolysis. Suggest reorder as clinically indicated.       ALT  36 U/L 10-54  Final           Glucose  75 mg/dL 74-99  Final      The American Diabetes Association (ADA) provides guidance for cutoff values for fasting glucose and random glucose. The ADA defines fasting as no caloric intake for at least 8 hours. Fasting plasma glucose results between 100 to 125 mg/dL indicate increased risk for diabetes (prediabetes).  Fasting plasma glucose results greater than or equal to 126 mg/dL meet the criteria for diagnosis of diabetes. In the absence of unequivocal hyperglycemia, results should be confirmed by repeat testing. In a patient with classic symptoms of hyperglycemia or hyperglycemic crisis, random plasma glucose results greater than or equal to 200 mg/dL meet the criteria for diagnosis of diabetes.  Reference: Standards of Medical Care in Diabetes 2016, American Diabetes Association. Diabetes Care. 2016.39(Suppl 1).       BUN  25 mg/dL 9-24 H Final            "Creatinine  1.38 mg/dL 0.73-1.22 H Final           Sodium  134 mmol/L 136-144 L Final           Potassium  4.6 mmol/L 3.7-5.1  Final           Chloride  97 mmol/L   Final           CO2  21 mmol/L 22-30 L Final           Anion Gap  16 mmol/L 9-18  Final           Estimated Glomerular Filtration Rate  51 mL/min/1.73 meters squared >=60 L Final      Estimated Glomerular Filtration Rate (eGFR) is calculated using the 2021 CKD-EPI creatinine equation. This equation utilizes serum creatinine, sex, and age as parameters. The creatinine assay has traceable calibration to isotope dilution-mass spectrometry. Refer to KDIGO guidelines for clinical interpretation. In patients with unstable renal function, e.g. those with acute kidney injury, the eGFR may not accurately reflect actual GFR.  Assessment/Plan      #Orthostatic hypotension and dizziness        -BP sitting 79/53 pulse 72, lying 105/55, pulse 74 on 3/27/23      -  BP Orthostatics- lying-96/59, sitting- 93/50, standing- 68/48. \"R\" complaints of dizziness      - Changed Furosemide to as needed if weight gain      - Continue monitor orthostatic BP, advised for compression stocking       - Give hydration with 0.9% NSS x 1 liters      - Consider Midodrine if no improvement   #Physical deconditioning   - C/W PT/OT as tolerate   #Anemia of chronic disease  - s/p blood transfusion  - current Hgb 7.9 on 3/24/23-->improves to 9.7 on 3/27/23  - No source of active bleeding   - continue monitor weekly cbc  #Intra-abdominal abscess  - s/p drain removed   - per chart reviewed; CT A/P showed decreased size of abscces and drain was removed    - c/w Piperacillin Sodium-Tazobactam Sodium) 3 GM EVERY 6 HOURS. Stop date on 3/28/23       #Abdominal pain   - hx of recent gastric perforation status post repair, history of diverticulitis  - c/w fentaNYL Transdermal Patch 72 Hour 12 MCG/HR  - c/w oxycodone 2.5 mg PO every 6 hours as needed   - c/w Tylenol as needed    #Med " reviewed  #Lab reviewed      Electronically Signed By: TEAGAN Chapman   4/3/23 12:53 AM

## 2023-03-28 NOTE — ASSESSMENT & PLAN NOTE
Status post IR drain placement with ABDI drain; drain care as ordered  - leukocytosis stable continue to monitor  - per chart reviewed; CT A/P with resolution/decreased in fluid collection.  - If leukocytosis persistent, will check UA; trend wbc/temps for now given patient is known case abdominal abscess and gastrocutaneous fistula  - c/w Piperacillin Sodium-Tazobactam Sodium) 3 GM EVERY 6 HOURS  -resumed home med per discharge summary reconcile medications.  - OP follow up with surgery and ID  -Patient's wife requested palliative care following for pain control. Order placed a  -Lab Test(s): cbc, cmp, crp weekly while on zosyn (Fax Results To: facility attending and Dr Vasques @ [phone]  - Continue monitor

## 2023-03-28 NOTE — ASSESSMENT & PLAN NOTE
Status post IR drain placement with ABDI drain; drain care as ordered  - leukocytosis stable continue to monitor  - per chart reviewed; CT A/P with resolution/decreased in fluid collection.  - If leukocytosis persistent, will check UA; trend wbc/temps for now given patient is known case abdominal abscess and gastrocutaneous fistula  - c/w Piperacillin Sodium-Tazobactam Sodium) 3 GM EVERY 6 HOURS  -resumed home med per discharge summary reconcile medications.  - OP follow up with surgery and ID  -Patient's wife requested palliative care following for pain control. Order placed a  -Lab Test(s): cbc, cmp, crp weekly while on zosyn (Fax Results To: facility attending and Dr Vasques @ [phone]  - Continue monitor

## 2023-03-28 NOTE — ASSESSMENT & PLAN NOTE
hx of Fluid overload and Bilateral pleural effusions  -continue PO Lasix, wean O2 TO MAINTAIN O2SAT >90%

## 2023-03-28 NOTE — ASSESSMENT & PLAN NOTE
x of Fluid overload and Bilateral pleural effusions  -continue PO Lasix, wean O2 TO MAINTAIN O2SAT >90%

## 2023-03-28 NOTE — PROGRESS NOTES
Acute visit    Problem List Items Addressed This Visit          Nervous    Acute post-operative pain     hx of recent gastric perforation status post repair, history of diverticulitis  - c/w fentaNYL Transdermal Patch 72 Hour 12 MCG/HR  - c/w oxycodone 5 mg PO every 6 hours as needed  - c/w Tylenol as needed            Digestive    Diverticulitis of colon    Intra-abdominal abscess (CMS/HCC)     Status post IR drain placement with ABDI drain; drain care as ordered  - leukocytosis stable continue to monitor  - per chart reviewed; CT A/P with resolution/decreased in fluid collection.  - If leukocytosis persistent, will check UA; trend wbc/temps for now given patient is known case abdominal abscess and gastrocutaneous fistula  - c/w Piperacillin Sodium-Tazobactam Sodium) 3 GM EVERY 6 HOURS  -resumed home med per discharge summary reconcile medications.  - OP follow up with surgery and ID  -Patient's wife requested palliative care following for pain control. Order placed a  -Lab Test(s): cbc, cmp, crp weekly while on zosyn (Fax Results To: facility attending and Dr Vasques @ [phone]  - Continue monitor         Gastrocutaneous fistula - Primary     Status post IR drain placement with ABDI drain; drain care as ordered  - leukocytosis stable continue to monitor  - per chart reviewed; CT A/P with resolution/decreased in fluid collection.  - If leukocytosis persistent, will check UA; trend wbc/temps for now given patient is known case abdominal abscess and gastrocutaneous fistula  - c/w Piperacillin Sodium-Tazobactam Sodium) 3 GM EVERY 6 HOURS  -resumed home med per discharge summary reconcile medications.  - OP follow up with surgery and ID  -Patient's wife requested palliative care following for pain control. Order placed a  -Lab Test(s): cbc, cmp, crp weekly while on zosyn (Fax Results To: facility attending and Dr Vasques @ [phone]  - Continue monitor            Genitourinary    CINDI (acute kidney injury) (CMS/[facility])      hx of Fluid overload and Bilateral pleural effusions  -continue PO Lasix, wean O2 TO MAINTAIN O2SAT >90%            Hematologic    Elevated WBC count     hx of recent gastric perforation status post repair, history of diverticulitis  - c/w fentaNYL Transdermal Patch 72 Hour 12 MCG/HR  - c/w oxycodone 5 mg PO every 6 hours as needed  - c/w Tylenol as needed            Other    Fatigue    Physical deconditioning     OT/PT/ST         On total parenteral nutrition (TPN)     NPO weekly labs         Recurrent major depressive disorder, in partial remission (CMS/HCC)     C/w Citalopram GDR not recommended            #Physical deconditioning  - C/W PT/OT as tolerated  #Leukocytosis and fever; 2/2 abdominal abscess on ATB therapy. Will trend cbc weekly. Tylenol as needed  #Abdominal abscesses  #Gastrocutaneous fistula  #Status post IR drain placement with ABDI drain; drain care as ordered  - leukocytosis stable continue to monitor  - per chart reviewed; CT A/P with resolution/decreased in fluid collection.  - If leukocytosis persistent, will check UA; trend wbc/temps for now given patient is known case abdominal abscess and gastrocutaneous fistula  - c/w Piperacillin Sodium-Tazobactam Sodium) 3 GM EVERY 6 HOURS  -resumed home med per discharge summary reconcile medications.  - OP follow up with surgery and ID  -Patient's wife requested palliative care following for pain control. Order placed a  -Lab Test(s): cbc, cmp, crp weekly while on zosyn (Fax Results To: facility attending and Dr Vasques @ [phone]  - Continue monitor  #Abdominal pain  - hx of recent gastric perforation status post repair, history of diverticulitis  - c/w fentaNYL Transdermal Patch 72 Hour 12 MCG/HR  - c/w oxycodone 5 mg PO every 6 hours as needed  - c/w Tylenol as needed  #GERD  - C/W Pantoprazole 40 MG po DAILY  - C/W Zofran as needed for N/V  #CINDI  - hx of Fluid overload and Bilateral pleural effusions  -continue PO Lasix, wean O2 TO MAINTAIN  O2SAT >90%  #Malnutrition  - NPO status  - Continue TPN  #moisture associated dermatitis on buttock  - c/w Triad cream (or A&D ointment) in dime thickness daily to  irritated areas.  #HLD  - c/w Atorvastatin 10 mg PO daily  #Depression  - c/w Citalopram 10 mg PO daily  -reviewed of hospital record via EMR and reconciled medication in [facility] and EMR (d/c summary). Reviewed orders, medications, records, and pertinent labs/x-rays from [facility]. Reviewed VS, BS, O2, etc as per protocol.       Chief Complaint  This is an acute visit for leucocytosis and pain    History of Present Illness  A 82-year-old man with history of recent gastric perforation status post repair, history of diverticulitis, BPH, HLD, presented from acute rehab with increasing abdominal pain, N/V, CT A/P positive for abdominal fluid collections compatible with abscesses, gastrocutaneous fistula. Patient admitted, started on IV Zosyn, IR guided drains placed-culture showed mixed anaerobes/aerobes, beta-lactamase producers. PICC line placed and started on TPN to allow for bowel rest. Repeat CT with stable/decreased fluid collections, drains in proper position. Patient d/c to  for rehab and continue TPN.  On encounter; per nursing staff reported pain   Active Problems  Problems  · Abnormal fasting glucose (790.29) (R73.01)  · Abnormality of lung on chest x-ray (793.19) (R91.8)  · Acute post-operative pain (338.18) (G89.18)  · Age-related nuclear cataract of left eye (366.16) (H25.12)  · Age-related nuclear cataract of right eye (366.16) (H25.11)  · Anemia, unspecified type (285.9) (D64.9)  · Arthritis of shoulder region, right (716.91) (M19.011)  · At risk for constipation (V49.89) (Z91.89)  · Bilateral shoulderpain (719.41) (M25.511,M25.512)  · Bladder cancer (188.9) (C67.9)  · Bladder cancer (188.9) (C67.9)  · Carpal tunnel syndrome, bilateral upper limbs (354.0) (G56.03)  · Chest pain (786.50) (R07.9)  · 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)  ·  Chronic pain of left lower extremity (729.5,338.29) (M79.605,G89.29)  · Combined form of age-related cataract, 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) (H25.813)  · Cortical age-related cataract of left eye (366.15) (H25.012)  · Cortical age-related cataract of right eye (366.15) (H25.011)  · Cyst, kidney, acquired (593.2) (N28.1)  · Dermatochalasis (374.87) (H02.839)  · Diverticulitis of colon (562.11) (K57.32)  · Dizziness (780.4) (R42)  · 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)  · EKG, abnormal (794.31) (R94.31)  · Elevated alkaline phosphatase level (790.5) (R74.8)  · Elevated glucose (790.29) (R73.09)  · Encounter for prostate cancer screening (V76.44) (Z12.5)  · 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.01)  · Fatigue (780.79) (R53.83)  ·Femur fracture, left (821.00) (S72.92XA)  · Fracture of shaft of right femur with routine healing (V54.15) (S72.301D)  · Groin pain (789.09) (R10.30)  · Hip pain, bilateral (719.45) (M25.551,M25.552)  · Hyperlipidemia (272.4) (E78.5)  · Iliotibial band tendinitis of right side (728.89) (M76.31)  · Knee pain (719.46) (M25.569)  · Left shoulder pain, unspecified chronicity (719.41) (M25.512)  · Leg pain (729.5) (M79.606)  · Leg pain (729.5) (M79.606)  · Limb pain (729.5) (M79.609)  · Liver cyst (573.8) (K76.89)  · Lower back pain (724.2) (M54.5)  · Lower back pain (724.2) (M54.50)  · Lumbar neuritis (724.4) (M54.16)  · Mixed type age-related cataract, left eye (366.19) (H25.812)  · Mixed type age-related cataract, right eye (366.19) (H25.811)  · Myopia with presbyopia (367.1,367.4) (H52.10,H52.4)  · Need for prophylactic antibiotic (V58.62) (Z79.2)  · Neuropathy (355.9) (G62.9)  · Numbness and tingling of hand (782.0) (R20.0,R20.2)  · Osteoporosis (733.00) (M81.0)  · Overweight with body mass index (BMI) of 28 to 28.9 in adult (278.02,V85.24)  (E66.3,Z68.28)  · Pain of left lower extremity (729.5) (M79.605)  · Pain of lower leg (729.5) (M79.669)  · Renal cyst (753.10) (N28.1)  · Right  shoulder pain, unspecified chronicity (719.41)(M25.511)  · Shoulder arthritis (716.91) (M19.019)  · SOB (shortness of breath) (786.05) (R06.02)  · 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)  · Status post total replacement of right shoulder (V43.61) (Z96.611)  · Tubular adenoma of colon (211.3) (D12.6)  · Vitreous floaters (379.24) (H43.399)  Past Medical History  Problems  · History of hyperlipidemia (V12.29) (Z86.39)  · History of malignant neoplasm of bladder (V10.51) (Z85.51)  · History of shortness of breath (V13.89) (Z87.898)  · Resolved Date: 22 Feb 2021  · History of Myalgia and myositis (729.1)  Surgical History  Problems  · History of Bladder Surgery  · History of Shoulder replacement  · History of Tonsillectomy With Adenoidectomy  · History of Total Hip Replacement  Family History  Mother  · Family history of Breast Cancer (V16.3)  Social History  Problems  · Being A Social Drinker  · Caffeine Use  · Exercising Regularly  · Former smoker (V15.82) (Z87.891)  · Marital History - Currently   · Non-smoker (V49.89) (Z78.9)  Allergies  Medication  · No Known Drug Allergies  Recorded By: Meenu Olsen; 12/11/2013 10:25:31 AM  Vitals    9 118 / 59 mmHg Sitting r/arm  11:29 66 bpm Regular   16 Breaths/min  99.0 mg/dL  11:29 93.0 % Oxygen via Nasal Cannula  Physical Exam  Physical exam  Constitutional: Patient is asleep, resting quietly in bed but does arouse to verbal stimuli, and A&Ox 1 (can state name), afebrile, not in acute distress  Eyes: clear sclera  ENMT: mucous membranes moist  Head/Neck: neck supple, trachea midline, no JVD  Respiratory/Thorax: CTAB, no rales/rhonchi/wheezing  Cardiovascular: RRR, no rubs/murmurs/gallops  Gastrointestinal: +BS x4, soft, nt/nd/ng/nr. Drain intact  Musculoskeletal: MCLEOD  Extremities: no edema, no cellulitis  Neurological: A&O x1, sleepy but eyes open with verbal  Psychological: Appropriate mood and behavior  Skin: warm and  dry,intact  Results/Data  Complete Blood Count + Differential 01Mar2023 09:52AM Aura Coley  Test Name Result Flag Reference  White Blood Cell Count 16.1 x10E9/L H 4.4 - 11.3  Red Blood Cell Count 2.78 x10E12/L L See Below  Reference Range: 4.50 - 5.90  Hemoglobin 7.6 g/dL L See Below  Reference Range: 13.5 - 17.5  HCT 24.4 % L See Below  Reference Range: 41.0 - 52.0  MCV 88 fL 80 - 100  MCHC 31.1 g/dL L See Below  Reference Range: 32.0 - 36.0  Platelet Count 507 x10E9/L H 150 - 450  RDW-CV 14.3 % See Below  Reference Range: 11.5 - 14.5  Neutrophil % 79.3 % See Below  Reference Range: 40.0 - 80.0  % Automated Immature Gran 1.0 % H 0.0 - 0.9  Immature Granulocyte Count (IG) includes promyelocytes,  myelocytes and metamyelocytes but does notinclude bands.  Percent differential counts (%) should be interpreted in the  context of the absolute cell counts (cells/L).  Lymphocyte % 8.1 % See Below  Reference Range: 13.0 - 44.0  Monocyte % 9.1 % 2.0 - 10.0  Eosinophil % 2.1 % 0.0 - 6.0  Basophil % 0.4 % 0.0 - 2.0  Neutrophil Count 12.76 x10E9/L H See Below  Reference Range: 1.60 - 5.50  Lymphocyte Count 1.30 x10E9/L See Below  Reference Range: 0.80 - 3.00  Monocyte Count 1.46 x10E9/L H See Below  Reference Range: 0.05 - 0.80  Eosinophil Count 0.34 x10E9/L See Below  Reference Range: 0.00 - 0.40  Basophil Count 0.07 x10E9/L See Below  Reference Range: 0.00 - 0.10  Magnesium, Serum 01Mar2023 09:52AM Silvio Dc  Test Name Result Flag Reference  Magnesium, Serum 1.90 mg/dL See Below  Reference Range: 1.60 - 2.40  Renal Function Panel 01Mar2023 09:52AM Silvio Dc  Test Name Result Flag Reference  Glucose, Serum 107 mg/dL H 74 - 99  Sodium, Serum 134 mmol/L L 136 - 145  POTASSIUM 4.0 mmol/L 3.5 - 5.3  Chloride, Serum 99 mmol/L 98 - 107  Bicarbonate, Serum 27 mmol/L 21 - 32  Anion Gap, Serum 12 mmol/L 10 - 20  Blood Urea Nitrogen, Serum 33 mg/dL H 6 - 23  CREATININE 1.44 mg/dL H See Below  Reference Range: 0.50 -  1.30  GFR MALE 48 mL/min/1.73m2 A >90  CALCULATIONS OF ESTIMATED GFR ARE PERFORMED  USING THE 2021 CKD-EPI STUDY REFIT EQUATION  WITHOUT THE RACE VARIABLE FOR THE IDMS-TRACEABLE  CREATININE METHODS.  https://jasn.asnjournals.org/content/early/2021/09/22/ASN.8250931166  Calcium, Serum 8.0 mg/dL L 8.6 - 10.3  Phosphorus, Serum 3.0 mg/dL 2.5 - 4.9  The performance characteristics of phosphorus testing in  heparinized plasma have been validated by the individual   laboratory site where testing is performed. Testing  on heparinized plasma is not approved bythe FDA;  however, such approval is not necessary.  Albumin, Serum 2.2 g/dL L 3.4 - 5.0

## 2023-03-28 NOTE — ASSESSMENT & PLAN NOTE
hx of recent gastric perforation status post repair, history of diverticulitis  - c/w fentaNYL Transdermal Patch 72 Hour 12 MCG/HR  - c/w oxycodone 5 mg PO every 6 hours as needed   139.9

## 2023-03-28 NOTE — PROGRESS NOTES
Acute visit          #Physical deconditioning  - C/W PT/OT as tolerated  #Leukocytosis and fever; 2/2 abdominal abscess on ATB therapy. Will trend cbc weekly. Tylenol as needed  #Abdominal abscesses  #Gastrocutaneous fistula  #Status post IR drain placement with ABDI drain; drain care as ordered  - leukocytosis stable continue to monitor  - per chart reviewed; CT A/P with resolution/decreased in fluid collection.  - If leukocytosis persistent, will check UA; trend wbc/temps for now given patient is known case abdominal abscess and gastrocutaneous fistula  - c/w Piperacillin Sodium-Tazobactam Sodium) 3 GM EVERY 6 HOURS  -resumed home med per discharge summary reconcile medications.  - OP follow up with surgery and ID  -Patient's wife requested palliative care following for pain control. Order placed a  -Lab Test(s): cbc, cmp, crp weekly while on zosyn (Fax Results To: facility attending and Dr Vasques @ [phone]  - Continue monitor  #Abdominal pain  - hx of recent gastric perforation status post repair, history of diverticulitis  - c/w fentaNYL Transdermal Patch 72 Hour 12 MCG/HR  - c/w oxycodone 5 mg PO every 6 hours as needed  - c/w Tylenol as needed  #GERD  - C/W Pantoprazole 40 MG po DAILY  - C/W Zofran as needed for N/V  #CINDI  - hx of Fluid overload and Bilateral pleural effusions  -continue PO Lasix, wean O2 TO MAINTAIN O2SAT >90%  #Malnutrition  - NPO status  - Continue TPN  #moisture associated dermatitis on buttock  - c/w Triad cream (or A&D ointment) in dime thickness daily to  irritated areas.  #HLD  - c/w Atorvastatin 10 mg PO daily  #Depression  - c/w Citalopram 10 mg PO daily  -reviewed of hospital record via EMR and reconciled medication in PCC and EMR (d/c summary). Reviewed orders, medications, records, and pertinent labs/x-rays from [facility]. Reviewed VS, BS, O2, etc as per protocol.       Chief Complaint  This is an acute visit for leucocytosis and pain    History of Present Illness  A 82-year-old man  with history of recent gastric perforation status post repair, history of diverticulitis, BPH, HLD, presented from acute rehab with increasing abdominal pain, N/V, CT A/P positive for abdominal fluid collections compatible with abscesses, gastrocutaneous fistula. Patient admitted, started on IV Zosyn, IR guided drains placed-culture showed mixed anaerobes/aerobes, beta-lactamase producers. PICC line placed and started on TPN to allow for bowel rest. Repeat CT with stable/decreased fluid collections, drains in proper position. Patient d/c to  for rehab and continue TPN.  On encounter; per nursing staff reported pain   Active Problems  Problems  · Abnormal fasting glucose (790.29) (R73.01)  · Abnormality of lung on chest x-ray (793.19) (R91.8)  · Acute post-operative pain (338.18) (G89.18)  · Age-related nuclear cataract of left eye (366.16) (H25.12)  · Age-related nuclear cataract of right eye (366.16) (H25.11)  · Anemia, unspecified type (285.9) (D64.9)  · Arthritis of shoulder region, right (716.91) (M19.011)  · At risk for constipation (V49.89) (Z91.89)  · Bilateral shoulderpain (719.41) (M25.511,M25.512)  · Bladder cancer (188.9) (C67.9)  · Bladder cancer (188.9) (C67.9)  · Carpal tunnel syndrome, bilateral upper limbs (354.0) (G56.03)  · Chest pain (786.50) (R07.9)  · 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)  · Chronic pain of left lower extremity (729.5,338.29) (M79.605,G89.29)  · Combined form of age-related cataract, 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) (H25.813)  · Cortical age-related cataract of left eye (366.15) (H25.012)  · Cortical age-related cataract of right eye (366.15) (H25.011)  · Cyst, kidney, acquired (593.2) (N28.1)  · Dermatochalasis (374.87) (H02.839)  · Diverticulitis of colon (562.11) (K57.32)  · Dizziness (780.4) (R42)  · 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)  · EKG, abnormal (794.31) (R94.31)  · Elevated alkaline phosphatase level (790.5) (R74.8)  · Elevated glucose (790.29) (R73.09)  ·  Encounter for prostate cancer screening (V76.44) (Z12.5)  · 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.01)  · Fatigue (780.79) (R53.83)  ·Femur fracture, left (821.00) (S72.92XA)  · Fracture of shaft of right femur with routine healing (V54.15) (S72.301D)  · Groin pain (789.09) (R10.30)  · Hip pain, bilateral (719.45) (M25.551,M25.552)  · Hyperlipidemia (272.4) (E78.5)  · Iliotibial band tendinitis of right side (728.89) (M76.31)  · Knee pain (719.46) (M25.569)  · Left shoulder pain, unspecified chronicity (719.41) (M25.512)  · Leg pain (729.5) (M79.606)  · Leg pain (729.5) (M79.606)  · Limb pain (729.5) (M79.609)  · Liver cyst (573.8) (K76.89)  · Lower back pain (724.2) (M54.5)  · Lower back pain (724.2) (M54.50)  · Lumbar neuritis (724.4) (M54.16)  · Mixed type age-related cataract, left eye (366.19) (H25.812)  · Mixed type age-related cataract, right eye (366.19) (H25.811)  · Myopia with presbyopia (367.1,367.4) (H52.10,H52.4)  · Need for prophylactic antibiotic (V58.62) (Z79.2)  · Neuropathy (355.9) (G62.9)  · Numbness and tingling of hand (782.0) (R20.0,R20.2)  · Osteoporosis (733.00) (M81.0)  · Overweight with body mass index (BMI) of 28 to 28.9 in adult (278.02,V85.24)  (E66.3,Z68.28)  · Pain of left lower extremity (729.5) (M79.605)  · Pain of lower leg (729.5) (M79.669)  · Renal cyst (753.10) (N28.1)  · Right shoulder pain, unspecified chronicity (719.41)(M25.511)  · Shoulder arthritis (716.91) (M19.019)  · SOB (shortness of breath) (786.05) (R06.02)  · 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)  · Status post total replacement of right shoulder (V43.61) (Z96.611)  · Tubular adenoma of colon (211.3) (D12.6)  · Vitreous floaters (379.24) (H43.399)  Past Medical History  Problems  · History of hyperlipidemia (V12.29) (Z86.39)  · History of malignant neoplasm of bladder (V10.51) (Z85.51)  · History of shortness of breath (V13.89) (Z87.898)  · Resolved Date: 22 Feb 2021  ·  History of Myalgia and myositis (729.1)  Surgical History  Problems  · History of Bladder Surgery  · History of Shoulder replacement  · History of Tonsillectomy With Adenoidectomy  · History of Total Hip Replacement  Family History  Mother  · Family history of Breast Cancer (V16.3)  Social History  Problems  · Being A Social Drinker  · Caffeine Use  · Exercising Regularly  · Former smoker (V15.82) (Z87.891)  · Marital History - Currently   · Non-smoker (V49.89) (Z78.9)  Allergies  Medication  · No Known Drug Allergies  Recorded By: Meenu Olsen; 12/11/2013 10:25:31 AM  Vitals    9 118 / 59 mmHg Sitting r/arm  11:29 66 bpm Regular   16 Breaths/min  99.0 mg/dL  11:29 93.0 % Oxygen via Nasal Cannula  Physical Exam  Physical exam  Constitutional: Patient is asleep, resting quietly in bed but does arouse to verbal stimuli, and A&Ox 1 (can state name), afebrile, not in acute distress  Eyes: clear sclera  ENMT: mucous membranes moist  Head/Neck: neck supple, trachea midline, no JVD  Respiratory/Thorax: CTAB, no rales/rhonchi/wheezing  Cardiovascular: RRR, no rubs/murmurs/gallops  Gastrointestinal: +BS x4, soft, nt/nd/ng/nr. Drain intact  Musculoskeletal: MCLEOD  Extremities: no edema, no cellulitis  Neurological: A&O x1, sleepy but eyes open with verbal  Psychological: Appropriate mood and behavior  Skin: warm and dry,intact  Results/Data  Complete Blood Count + Differential 01Mar2023 09:52AM Aura Coley  Test Name Result Flag Reference  White Blood Cell Count 16.1 x10E9/L H 4.4 - 11.3  Red Blood Cell Count 2.78 x10E12/L L See Below  Reference Range: 4.50 - 5.90  Hemoglobin 7.6 g/dL L See Below  Reference Range: 13.5 - 17.5  HCT 24.4 % L See Below  Reference Range: 41.0 - 52.0  MCV 88 fL 80 - 100  MCHC 31.1 g/dL L See Below  Reference Range: 32.0 - 36.0  Platelet Count 507 x10E9/L H 150 - 450  RDW-CV 14.3 % See Below  Reference Range: 11.5 - 14.5  Neutrophil % 79.3 % See Below  Reference Range: 40.0 - 80.0  %  Automated Immature Gran 1.0 % H 0.0 - 0.9  Immature Granulocyte Count (IG) includes promyelocytes,  myelocytes and metamyelocytes but does notinclude bands.  Percent differential counts (%) should be interpreted in the  context of the absolute cell counts (cells/L).  Lymphocyte % 8.1 % See Below  Reference Range: 13.0 - 44.0  Monocyte % 9.1 % 2.0 - 10.0  Eosinophil % 2.1 % 0.0 - 6.0  Basophil % 0.4 % 0.0 - 2.0  Neutrophil Count 12.76 x10E9/L H See Below  Reference Range: 1.60 - 5.50  Lymphocyte Count 1.30 x10E9/L See Below  Reference Range: 0.80 - 3.00  Monocyte Count 1.46 x10E9/L H See Below  Reference Range: 0.05 - 0.80  Eosinophil Count 0.34 x10E9/L See Below  Reference Range: 0.00 - 0.40  Basophil Count 0.07 x10E9/L See Below  Reference Range: 0.00 - 0.10  Magnesium, Serum 01Mar2023 09:52AM Silvio Dc  Test Name Result Flag Reference  Magnesium, Serum 1.90 mg/dL See Below  Reference Range: 1.60 - 2.40  Renal Function Panel 01Mar2023 09:52AM Silvio Dc  Test Name Result Flag Reference  Glucose, Serum 107 mg/dL H 74 - 99  Sodium, Serum 134 mmol/L L 136 - 145  POTASSIUM 4.0 mmol/L 3.5 - 5.3  Chloride, Serum 99 mmol/L 98 - 107  Bicarbonate, Serum 27 mmol/L 21 - 32  Anion Gap, Serum 12 mmol/L 10 - 20  Blood Urea Nitrogen, Serum 33 mg/dL H 6 - 23  CREATININE 1.44 mg/dL H See Below  Reference Range: 0.50 - 1.30  GFR MALE 48 mL/min/1.73m2 A >90  CALCULATIONS OF ESTIMATED GFR ARE PERFORMED  USING THE 2021 CKD-EPI STUDY REFIT EQUATION  WITHOUT THE RACE VARIABLE FOR THE IDMS-TRACEABLE  CREATININE METHODS.  https://jasn.asnjournals.org/content/early/2021/09/22/ASN.7534772307  Calcium, Serum 8.0 mg/dL L 8.6 - 10.3  Phosphorus, Serum 3.0 mg/dL 2.5 - 4.9  The performance characteristics of phosphorus testing in  heparinized plasma have been validated by the individual   laboratory site where testing is performed. Testing  on heparinized plasma is not approved bythe FDA;  however, such approval is not  necessary.  Albumin, Serum 2.2 g/dL L 3.4 - 5.0

## 2023-03-30 ENCOUNTER — NURSING HOME VISIT (OUTPATIENT)
Dept: POST ACUTE CARE | Facility: EXTERNAL LOCATION | Age: 83
End: 2023-03-30
Payer: MEDICARE

## 2023-03-30 DIAGNOSIS — Z79.899 ENCOUNTER FOR MEDICATION REVIEW: ICD-10-CM

## 2023-03-30 DIAGNOSIS — R42 ORTHOSTATIC DIZZINESS: ICD-10-CM

## 2023-03-30 DIAGNOSIS — D63.8 ANEMIA OF CHRONIC DISEASE: ICD-10-CM

## 2023-03-30 DIAGNOSIS — K65.1 INTRA-ABDOMINAL ABSCESS (MULTI): ICD-10-CM

## 2023-03-30 DIAGNOSIS — Z01.89 LABORATORY EXAMINATION: ICD-10-CM

## 2023-03-30 DIAGNOSIS — R53.81 PHYSICAL DECONDITIONING: ICD-10-CM

## 2023-03-30 DIAGNOSIS — I95.1 ORTHOSTATIC HYPOTENSION: Primary | ICD-10-CM

## 2023-03-30 PROCEDURE — 99309 SBSQ NF CARE MODERATE MDM 30: CPT | Performed by: NURSE PRACTITIONER

## 2023-03-30 NOTE — LETTER
Patient: Dakota Pugh  : 1940    Encounter Date: 2023    Late entry;     Subjective  Patient ID: Dakota Pugh is a 83 y.o. male who is acute skilled care and seen for new acute hypotension and orthostatic hypotension and dizziness.    HPI   A 82-year-old man with history of recent gastric perforation status post repair, history of diverticulitis, BPH, HLD, presented from acute rehab with increasing abdominal pain, N/V, CT A/P positive for abdominal fluid collections compatible with abscesses, gastrocutaneous fistula. Patient admitted, started on IV Zosyn, IR guided drains placed-culture showed mixed anaerobes/aerobes, beta-lactamase producers. PICC line placed and started on TPN to allow for bowel rest. Repeat CT with stable/decreased fluid collections, drains in proper position. Patient d/c to  for rehab and continue TPN. Patient was admitted to  during 3/2/23-3/14/23. During rehab stay patient found to have anemia with significantly dropped to 6.6 and required blood transfusion due to patient symptomatic fatigue and weakness. Infectious disease rec' for further work-up.  CT abdomen negative for intraabdominal hemorrhage but showed decreased in size of intraabdominal abscess.  Patient's drain was removed but rec' continue ATB. He received blood transfusion and impressed r/t chronic disease. TPN also discontinued and patient tolerate PO diet. He discharged back to  on 3/24/23 to continue rehab and ATB.     Patient remains having Orthostatic BP's- lying- 109/61, sitting- 104/62, standing- 81/50 with dizziness. Midodrine started to support blood pressure. Had episode of vomited.  Otherwise he denies chest pain, SOB, F/C/S/N/V.     Objective  Vitals: T 98 - 3/30/2023 19:20 Route: Forehead (non-contact)  /63 - 3/30/2023 19:20 Position: Sitting l/arm  P 69 - 3/30/2023 19:20 Pulse Type: Regular  Character: Normal.  R 18 - 3/30/2023 19:20  O2 98 % -3/30/2023 19:20 Method: Room Air  BS 92.0 -  3/30/2023 17:02  W 175.2 lb - 3/30/2023 15:52 Scale: Sitting  Physical exam  Constitutional: awake, afebrile, not in acute distress  Eyes: EOMI, clear sclera  ENMT: mucous membranes moist  Respiratory/Thorax: CTAB, no rales/rhonchi/wheezing  Cardiovascular: RRR, no rubs/murmurs/gallops  Gastrointestinal: +BS x4, soft, nt/nd/ng/nr  Musculoskeletal: ROM WNL, normal strength   Extremities: no edema, no cellulitis  Neurological: A&O x3, attention intact, speech fluent   Psychological: Appropriate mood and behavior  Skin: warm and dry, intact     Labs on 3/30/23   CBC       White Blood Cell Count  5.93 k/uL 3.70-11.00  Final           RBC  3.26 m/uL 4.20-6.00 L Final           Hemoglobin  9.0 g/dL 13.0-17.0 L Final           Hematocrit  28.2 % 39.0-51.0 L Final           MCV  86.5 fL 80.0-100.0  Final           MCH  27.6 pg 26.0-34.0  Final           MCHC  31.9 g/dL 30.5-36.0  Final           RDW-CV  17.6 % 11.5-15.0 H Final           Platelet Count  342 k/uL 150-400  Final           MPV  9.2 fL 9.0-12.7  Final           Absolute nRBC  <0.01 k/uL <0.01  Final       Basic Metabolic Panl       Glucose  63 mg/dL 74-99 L Final      The American Diabetes Association (ADA) provides guidance for cutoff values for fasting glucose and random glucose. The ADA defines fasting as no caloric intake for at least 8 hours. Fasting plasma glucose results between 100 to 125 mg/dL indicate increased risk for diabetes (prediabetes).  Fasting plasma glucose results greater than or equal to 126 mg/dL meet the criteria for diagnosis of diabetes. In the absence of unequivocal hyperglycemia, results should be confirmed by repeat testing. In a patient with classic symptoms of hyperglycemia or hyperglycemic crisis, random plasma glucose results greater than or equal to 200 mg/dL meet the criteria for diagnosis of diabetes.  Reference: Standards of Medical Care in Diabetes 2016, American Diabetes Association. Diabetes Care. 2016.39(Suppl 1).        BUN  17 mg/dL 9-24  Final           Creatinine  0.98 mg/dL 0.73-1.22  Final           Sodium  134 mmol/L 136-144 L Final           Potassium  4.0 mmol/L 3.7-5.1  Final           Chloride  100 mmol/L   Final           CO2  21 mmol/L 22-30 L Final           Anion Gap  13 mmol/L 9-18  Final           Calcium, Total  9.9 mg/dL 8.5-10.2  Final           Estimated Glomerular Filtration Rate  77 mL/min/1.73 meters squared >=60  Final      Estimated Glomerular Filtration Rate (eGFR) is calculated using the 2021 CKD-EPI creatinine equation. This equation utilizes serum creatinine, sex, and age as parameters. The creatinine assay has traceable calibration to isotope dilution-mass spectrometry. Refer to KDIGO guidelines for clinical interpretation. In patients with unstable renal function, e.g. those with acute kidney injury, the eGFR may not accurately reflect actual GFR.  Assessment/Plan      #Orthostatic hypotension and dizziness        -Remains having significantly orthoststaic hypotension      - Continue monitor orthostatic BP, advised for compression stocking       - Give hydration with 0.9% NSS x 1 liters      - Started Midodrine 5 mg PO every 8 hours as needed   #Physical deconditioning   - C/W PT/OT as tolerate   #Anemia of chronic disease  - s/p blood transfusion  - current Hgb 7.9 on 3/24/23-->improves to 9.7 on 3/27/23-->9 on 3/30/23  - No source of active bleeding   - continue monitor weekly cbc  #Intra-abdominal abscess  - s/p drain removed   - per chart reviewed; CT A/P showed decreased size of abscces and drain was removed    - Completed Zosyn on 3/28/23  - c/w Zofran as needed   #Med reviewed  #Lab reviewed      Electronically Signed By: JOSÉ ANTONIO Chapman-CNP   4/3/23  1:04 AM

## 2023-04-01 ENCOUNTER — NURSING HOME VISIT (OUTPATIENT)
Dept: POST ACUTE CARE | Facility: EXTERNAL LOCATION | Age: 83
End: 2023-04-01
Payer: MEDICARE

## 2023-04-01 DIAGNOSIS — R53.83 FATIGUE, UNSPECIFIED TYPE: ICD-10-CM

## 2023-04-01 DIAGNOSIS — K65.1 INTRA-ABDOMINAL ABSCESS (MULTI): ICD-10-CM

## 2023-04-01 DIAGNOSIS — Z78.9 ON TOTAL PARENTERAL NUTRITION (TPN): ICD-10-CM

## 2023-04-01 DIAGNOSIS — K57.32 DIVERTICULITIS OF COLON: ICD-10-CM

## 2023-04-01 DIAGNOSIS — N17.9 AKI (ACUTE KIDNEY INJURY) (CMS-HCC): ICD-10-CM

## 2023-04-01 DIAGNOSIS — G89.18 ACUTE POST-OPERATIVE PAIN: ICD-10-CM

## 2023-04-01 DIAGNOSIS — K31.6 GASTROCUTANEOUS FISTULA: ICD-10-CM

## 2023-04-01 DIAGNOSIS — D63.1 ANEMIA DUE TO STAGE 3A CHRONIC KIDNEY DISEASE (MULTI): Primary | ICD-10-CM

## 2023-04-01 DIAGNOSIS — F33.41 RECURRENT MAJOR DEPRESSIVE DISORDER, IN PARTIAL REMISSION (CMS-HCC): ICD-10-CM

## 2023-04-01 DIAGNOSIS — N18.31 ANEMIA DUE TO STAGE 3A CHRONIC KIDNEY DISEASE (MULTI): Primary | ICD-10-CM

## 2023-04-01 DIAGNOSIS — D72.825 BANDEMIA: ICD-10-CM

## 2023-04-01 DIAGNOSIS — R53.81 PHYSICAL DECONDITIONING: ICD-10-CM

## 2023-04-01 PROCEDURE — 99310 SBSQ NF CARE HIGH MDM 45: CPT | Performed by: FAMILY MEDICINE

## 2023-04-01 NOTE — LETTER
Patient: Dakota Pugh  : 1940    Encounter Date: 2023    Acute visit  Problem List Items Addressed This Visit          Nervous    Acute post-operative pain       Digestive    Diverticulitis of colon    Intra-abdominal abscess (CMS/HCC)     C/w ATB         Gastrocutaneous fistula     Follow up with surgery            Genitourinary    CINDI (acute kidney injury) (CMS/[facility])     Monitor RF            Hematologic    Anemia - Primary    Elevated WBC count     Monitor CBC abscess samller            Other    Fatigue     OT/PT         Physical deconditioning     OT/PT/ST         On total parenteral nutrition (TPN)     Check CMP and adjsut         Recurrent major depressive disorder, in partial remission (CMS/[facility])     C/w meds                  Anemia Patient was given 1 U PRBC in hospital. Will check cbc bmp 1-2 times a week.     #Physical deconditioning  - C/W PT/OT as tolerated  #Leukocytosis and fever; 2/2 abdominal abscess on ATB therapy. Will trend cbc weekly. Tylenol as needed  #Abdominal abscesses  #Gastrocutaneous fistula  #Status post IR drain placement with ABDI drain; drain care as ordered  - leukocytosis stable continue to monitor  - per chart reviewed; CT A/P with resolution/decreased in fluid collection.  - If leukocytosis persistent, will check UA; trend wbc/temps for now given patient is known case abdominal abscess and gastrocutaneous fistula  - c/w Piperacillin Sodium-Tazobactam Sodium) 3 GM EVERY 6 HOURS  -resumed home med per discharge summary reconcile medications.  - OP follow up with surgery and ID  -Patient's wife requested palliative care following for pain control. Order placed a  -Lab Test(s): cbc, cmp, crp weekly while on zosyn (Fax Results To: facility attending and Dr Vasques @ [phone]  - Continue monitor  #Abdominal pain  - hx of recent gastric perforation status post repair, history of diverticulitis  - c/w fentaNYL Transdermal Patch 72 Hour 12 MCG/HR  - c/w oxycodone 5 mg PO every  6 hours as needed  - c/w Tylenol as needed  #GERD  - C/W Pantoprazole 40 MG po DAILY  - C/W Zofran as needed for N/V  #CINDI  - hx of Fluid overload and Bilateral pleural effusions  -continue PO Lasix, wean O2 TO MAINTAIN O2SAT >90%  #Malnutrition  - NPO status  - Continue TPN  #moisture associated dermatitis on buttock  - c/w Triad cream (or A&D ointment) in dime thickness daily to  irritated areas.  #HLD  - c/w Atorvastatin 10 mg PO daily  #Depression  - c/w Citalopram 10 mg PO daily  -reviewed of hospital record via EMR and reconciled medication in PCC and EMR (d/c summary). Reviewed orders, medications, records, and pertinent labs/x-rays from PCC. Reviewed VS, BS, O2, etc as per protocol.       Chief Complaint  This is an acute visit for readmission from hospital 2/2 Anemia     History of Present Illness  A 82-year-old man with history of recent gastric perforation status post repair, history of diverticulitis, BPH, HLD, presented from acute rehab with increasing abdominal pain, N/V, CT A/P positive for abdominal fluid collections compatible with abscesses, gastrocutaneous fistula. Patient admitted, started on IV Zosyn, IR guided drains placed-culture showed mixed anaerobes/aerobes, beta-lactamase producers. PICC line placed and started on TPN to allow for bowel rest. Repeat CT with stable/decreased fluid collections, drains in proper position. Patient d/c to  for rehab and continue TPN.  On encounter; per nursing staff reported patient is week but doing better Hospital record reviwed  Active Problems  Problems  · Abnormal fasting glucose (790.29) (R73.01)  · Abnormality of lung on chest x-ray (793.19) (R91.8)  · Acute post-operative pain (338.18) (G89.18)  · Age-related nuclear cataract of left eye (366.16) (H25.12)  · Age-related nuclear cataract of right eye (366.16) (H25.11)  · Anemia, unspecified type (285.9) (D64.9)  · Arthritis of shoulder region, right (716.91) (M19.011)  · At risk for constipation  (V49.89) (Z91.89)  · Bilateral shoulderpain (719.41) (M25.511,M25.512)  · Bladder cancer (188.9) (C67.9)  · Bladder cancer (188.9) (C67.9)  · Carpal tunnel syndrome, bilateral upper limbs (354.0) (G56.03)  · Chest pain (786.50) (R07.9)  · 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)  · Chronic pain of left lower extremity (729.5,338.29) (M79.605,G89.29)  · Combined form of age-related cataract, 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) (H25.813)  · Cortical age-related cataract of left eye (366.15) (H25.012)  · Cortical age-related cataract of right eye (366.15) (H25.011)  · Cyst, kidney, acquired (593.2) (N28.1)  · Dermatochalasis (374.87) (H02.839)  · Diverticulitis of colon (562.11) (K57.32)  · Dizziness (780.4) (R42)  · 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)  · EKG, abnormal (794.31) (R94.31)  · Elevated alkaline phosphatase level (790.5) (R74.8)  · Elevated glucose (790.29) (R73.09)  · Encounter for prostate cancer screening (V76.44) (Z12.5)  · 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.01)  · Fatigue (780.79) (R53.83)  ·Femur fracture, left (821.00) (S72.92XA)  · Fracture of shaft of right femur with routine healing (V54.15) (S72.301D)  · Groin pain (789.09) (R10.30)  · Hip pain, bilateral (719.45) (M25.551,M25.552)  · Hyperlipidemia (272.4) (E78.5)  · Iliotibial band tendinitis of right side (728.89) (M76.31)  · Knee pain (719.46) (M25.569)  · Left shoulder pain, unspecified chronicity (719.41) (M25.512)  · Leg pain (729.5) (M79.606)  · Leg pain (729.5) (M79.606)  · Limb pain (729.5) (M79.609)  · Liver cyst (573.8) (K76.89)  · Lower back pain (724.2) (M54.5)  · Lower back pain (724.2) (M54.50)  · Lumbar neuritis (724.4) (M54.16)  · Mixed type age-related cataract, left eye (366.19) (H25.812)  · Mixed type age-related cataract, right eye (366.19) (H25.811)  · Myopia with presbyopia (367.1,367.4) (H52.10,H52.4)  · Need for prophylactic antibiotic (V58.62) (Z79.2)  · Neuropathy (355.9) (G62.9)  · Numbness and  tingling of hand (782.0) (R20.0,R20.2)  · Osteoporosis (733.00) (M81.0)  · Overweight with body mass index (BMI) of 28 to 28.9 in adult (278.02,V85.24)  (E66.3,Z68.28)  · Pain of left lower extremity (729.5) (M79.605)  · Pain of lower leg (729.5) (M79.669)  · Renal cyst (753.10) (N28.1)  · Right shoulder pain, unspecified chronicity (719.41)(M25.511)  · Shoulder arthritis (716.91) (M19.019)  · SOB (shortness of breath) (786.05) (R06.02)  · 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)  · Status post total replacement of right shoulder (V43.61) (Z96.611)  · Tubular adenoma of colon (211.3) (D12.6)  · Vitreous floaters (379.24) (H43.399)  Past Medical History  Problems  · History of hyperlipidemia (V12.29) (Z86.39)  · History of malignant neoplasm of bladder (V10.51) (Z85.51)  · History of shortness of breath (V13.89) (Z87.898)  · Resolved Date: 22 Feb 2021  · History of Myalgia and myositis (729.1)  Surgical History  Problems  · History of Bladder Surgery  · History of Shoulder replacement  · History of Tonsillectomy With Adenoidectomy  · History of Total Hip Replacement  Family History  Mother  · Family history of Breast Cancer (V16.3)  Social History  Problems  · Being A Social Drinker  · Caffeine Use  · Exercising Regularly  · Former smoker (V15.82) (Z87.891)  · Marital History - Currently   · Non-smoker (V49.89) (Z78.9)  Allergies  Medication  · No Known Drug Allergies  Recorded By: Meenu Olsen; 12/11/2013 10:25:31 AM  Vitals    9 118 / 59 mmHg Sitting r/arm  11:29 66 bpm Regular   16 Breaths/min  99.0 mg/dL  11:29 93.0 % Oxygen via Nasal Cannula  Physical Exam  Physical exam  Constitutional: Patient is asleep, resting quietly in bed but does arouse to verbal stimuli, and A&Ox 1 (can state name), afebrile, not in acute distress  Eyes: clear sclera  ENMT: mucous membranes moist  Head/Neck: neck supple, trachea midline, no JVD  Respiratory/Thorax: CTAB, no  rales/rhonchi/wheezing  Cardiovascular: RRR, no rubs/murmurs/gallops  Gastrointestinal: +BS x4, soft, nt/nd/ng/nr. Drain intact  Musculoskeletal: MCLEOD  Extremities: no edema, no cellulitis  Neurological: A&O x1, sleepy but eyes open with verbal  Psychological: Appropriate mood and behavior  Skin: warm and dry,intact      Electronically Signed By: lEver Nuno MD   4/2/23  5:14 PM

## 2023-04-02 NOTE — PROGRESS NOTES
Acute visit  Problem List Items Addressed This Visit          Nervous    Acute post-operative pain       Digestive    Diverticulitis of colon    Intra-abdominal abscess (CMS/HCC)     C/w ATB         Gastrocutaneous fistula     Follow up with surgery            Genitourinary    CINDI (acute kidney injury) (CMS/[facility])     Monitor RF            Hematologic    Anemia - Primary    Elevated WBC count     Monitor CBC abscess samller            Other    Fatigue     OT/PT         Physical deconditioning     OT/PT/ST         On total parenteral nutrition (TPN)     Check CMP and adjsut         Recurrent major depressive disorder, in partial remission (CMS/[facility])     C/w meds                  Anemia Patient was given 1 U PRBC in hospital. Will check cbc bmp 1-2 times a week.     #Physical deconditioning  - C/W PT/OT as tolerated  #Leukocytosis and fever; 2/2 abdominal abscess on ATB therapy. Will trend cbc weekly. Tylenol as needed  #Abdominal abscesses  #Gastrocutaneous fistula  #Status post IR drain placement with ABDI drain; drain care as ordered  - leukocytosis stable continue to monitor  - per chart reviewed; CT A/P with resolution/decreased in fluid collection.  - If leukocytosis persistent, will check UA; trend wbc/temps for now given patient is known case abdominal abscess and gastrocutaneous fistula  - c/w Piperacillin Sodium-Tazobactam Sodium) 3 GM EVERY 6 HOURS  -resumed home med per discharge summary reconcile medications.  - OP follow up with surgery and ID  -Patient's wife requested palliative care following for pain control. Order placed a  -Lab Test(s): cbc, cmp, crp weekly while on zosyn (Fax Results To: facility attending and Dr Vasques @ [phone]  - Continue monitor  #Abdominal pain  - hx of recent gastric perforation status post repair, history of diverticulitis  - c/w fentaNYL Transdermal Patch 72 Hour 12 MCG/HR  - c/w oxycodone 5 mg PO every 6 hours as needed  - c/w Tylenol as needed  #GERD  - C/W Pantoprazole  40 MG po DAILY  - C/W Zofran as needed for N/V  #CINDI  - hx of Fluid overload and Bilateral pleural effusions  -continue PO Lasix, wean O2 TO MAINTAIN O2SAT >90%  #Malnutrition  - NPO status  - Continue TPN  #moisture associated dermatitis on buttock  - c/w Triad cream (or A&D ointment) in dime thickness daily to  irritated areas.  #HLD  - c/w Atorvastatin 10 mg PO daily  #Depression  - c/w Citalopram 10 mg PO daily  -reviewed of hospital record via EMR and reconciled medication in PCC and EMR (d/c summary). Reviewed orders, medications, records, and pertinent labs/x-rays from PCC. Reviewed VS, BS, O2, etc as per protocol.       Chief Complaint  This is an acute visit for readmission from hospital 2/2 Anemia     History of Present Illness  A 82-year-old man with history of recent gastric perforation status post repair, history of diverticulitis, BPH, HLD, presented from acute rehab with increasing abdominal pain, N/V, CT A/P positive for abdominal fluid collections compatible with abscesses, gastrocutaneous fistula. Patient admitted, started on IV Zosyn, IR guided drains placed-culture showed mixed anaerobes/aerobes, beta-lactamase producers. PICC line placed and started on TPN to allow for bowel rest. Repeat CT with stable/decreased fluid collections, drains in proper position. Patient d/c to  for rehab and continue TPN.  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011)  · Cyst, kidney, acquired (593.2) (N28.1)  · Dermatochalasis (374.87) (H02.839)  · Diverticulitis of colon (562.11) (K57.32)  · Dizziness (780.4) (R42)  · 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)  · EKG, abnormal (794.31) (R94.31)  · Elevated alkaline phosphatase level (790.5) (R74.8)  · Elevated glucose (790.29) (R73.09)  · Encounter for prostate cancer screening (V76.44) (Z12.5)  · 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.01)  · Fatigue (780.79) (R53.83)  ·Femur fracture, left (821.00) (S72.92XA)  · Fracture of shaft of right femur with routine healing (V54.15) (S72.301D)  · Groin pain (789.09) (R10.30)  · Hip pain, bilateral (719.45) (M25.551,M25.552)  · Hyperlipidemia (272.4) (E78.5)  · Iliotibial band tendinitis of right side (728.89) (M76.31)  · Knee pain (719.46) (M25.569)  · Left shoulder pain, unspecified chronicity (719.41) (M25.512)  · Leg pain (729.5) (M79.606)  · Leg pain (729.5) (M79.606)  · Limb pain (729.5) (M79.609)  · Liver cyst (573.8) (K76.89)  · Lower back pain (724.2) (M54.5)  · Lower back pain (724.2) (M54.50)  · Lumbar neuritis (724.4) (M54.16)  · Mixed type age-related cataract, left eye (366.19) (H25.812)  · Mixed type age-related cataract, right eye (366.19) (H25.811)  · Myopia with presbyopia (367.1,367.4) (H52.10,H52.4)  · Need for prophylactic antibiotic (V58.62) (Z79.2)  · Neuropathy (355.9) (G62.9)  · Numbness and tingling of hand (782.0) (R20.0,R20.2)  · Osteoporosis (733.00) (M81.0)  ·  Overweight with body mass index (BMI) of 28 to 28.9 in adult (278.02,V85.24)  (E66.3,Z68.28)  · Pain of left lower extremity (729.5) (M79.605)  · Pain of lower leg (729.5) (M79.669)  · Renal cyst (753.10) (N28.1)  · Right shoulder pain, unspecified chronicity (719.41)(M25.511)  · Shoulder arthritis (716.91) (M19.019)  · SOB (shortness of breath) (786.05) (R06.02)  · 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)  · Status post total replacement of right shoulder (V43.61) (Z96.611)  · Tubular adenoma of colon (211.3) (D12.6)  · Vitreous floaters (379.24) (H43.399)  Past Medical History  Problems  · History of hyperlipidemia (V12.29) (Z86.39)  · History of malignant neoplasm of bladder (V10.51) (Z85.51)  · History of shortness of breath (V13.89) (Z87.898)  · Resolved Date: 22 Feb 2021  · History of Myalgia and myositis (729.1)  Surgical History  Problems  · History of Bladder Surgery  · History of Shoulder replacement  · History of Tonsillectomy With Adenoidectomy  · History of Total Hip Replacement  Family History  Mother  · Family history of Breast Cancer (V16.3)  Social History  Problems  · Being A Social Drinker  · Caffeine Use  · Exercising Regularly  · Former smoker (V15.82) (Z87.891)  · Marital History - Currently   · Non-smoker (V49.89) (Z78.9)  Allergies  Medication  · No Known Drug Allergies  Recorded By: Meenu Olsen; 12/11/2013 10:25:31 AM  Vitals    9 118 / 59 mmHg Sitting r/arm  11:29 66 bpm Regular   16 Breaths/min  99.0 mg/dL  11:29 93.0 % Oxygen via Nasal Cannula  Physical Exam  Physical exam  Constitutional: Patient is asleep, resting quietly in bed but does arouse to verbal stimuli, and A&Ox 1 (can state name), afebrile, not in acute distress  Eyes: clear sclera  ENMT: mucous membranes moist  Head/Neck: neck supple, trachea midline, no JVD  Respiratory/Thorax: CTAB, no rales/rhonchi/wheezing  Cardiovascular: RRR, no rubs/murmurs/gallops  Gastrointestinal: +BS x4, soft,  nt/nd/ng/nr. Drain intact  Musculoskeletal: MCLEOD  Extremities: no edema, no cellulitis  Neurological: A&O x1, sleepy but eyes open with verbal  Psychological: Appropriate mood and behavior  Skin: warm and dry,intact

## 2023-04-03 PROBLEM — R10.9 ABDOMINAL PAIN: Status: ACTIVE | Noted: 2023-04-03

## 2023-04-03 PROBLEM — R42 ORTHOSTATIC DIZZINESS: Status: ACTIVE | Noted: 2023-04-03

## 2023-04-03 PROBLEM — I95.1 ORTHOSTATIC HYPOTENSION: Status: ACTIVE | Noted: 2023-04-03

## 2023-04-03 ASSESSMENT — ENCOUNTER SYMPTOMS
HEADACHES: 0
AGITATION: 0
ABDOMINAL DISTENTION: 0
ABDOMINAL DISTENTION: 0
CHILLS: 0
PALPITATIONS: 0
NAUSEA: 0
SHORTNESS OF BREATH: 0
CHILLS: 0
WHEEZING: 0
DIZZINESS: 0
WHEEZING: 0
PALPITATIONS: 0
CONFUSION: 0
COLOR CHANGE: 0
ABDOMINAL PAIN: 0
SHORTNESS OF BREATH: 0
CONFUSION: 0
FATIGUE: 0
ABDOMINAL PAIN: 0
VOMITING: 0
NAUSEA: 0
CONSTIPATION: 0
JOINT SWELLING: 0
FEVER: 0
JOINT SWELLING: 0
DYSURIA: 0
AGITATION: 0
COLOR CHANGE: 0
DIZZINESS: 1
COUGH: 0
VOMITING: 0
FEVER: 0
HEADACHES: 0
FATIGUE: 0
CONSTIPATION: 0
COUGH: 0
DYSURIA: 0
LIGHT-HEADEDNESS: 1

## 2023-04-03 NOTE — PROGRESS NOTES
Late entry;     Subjective   Patient ID: Dakota Pugh is a 83 y.o. male who is acute skilled care and presents for initial visit for skilled nursing.    HPI   A 82-year-old man with history of recent gastric perforation status post repair, history of diverticulitis, BPH, HLD, presented from acute rehab with increasing abdominal pain, N/V, CT A/P positive for abdominal fluid collections compatible with abscesses, gastrocutaneous fistula. Patient admitted, started on IV Zosyn, IR guided drains placed-culture showed mixed anaerobes/aerobes, beta-lactamase producers. PICC line placed and started on TPN to allow for bowel rest. Repeat CT with stable/decreased fluid collections, drains in proper position. Patient d/c to  for rehab and continue TPN. Patient was admitted to  during 3/2/23-3/14/23. During rehab stay patient found to have anemia with significantly dropped to 6.6 and required blood transfusion due to patient symptomatic fatigue and weakness. Infectious disease rec' for further work-up.  CT abdomen negative for intraabdominal hemorrhage but showed decreased in size of intraabdominal abscess.  Patient's drain was removed but rec' continue ATB. He received blood transfusion and impressed r/t chronic disease. TPN also discontinued and patient tolerate PO diet. He discharged back to  on 3/24/23 to continue rehab and ATB.     On encounter; patient reported feeling better. Pain appropriate controlled with current pain med. Tolerate PO diet. See ROS.   Review of Systems   Constitutional:  Negative for chills, fatigue and fever.   Respiratory:  Negative for cough, shortness of breath and wheezing.    Cardiovascular:  Negative for chest pain, palpitations and leg swelling.   Gastrointestinal:  Negative for abdominal distention, abdominal pain, constipation, nausea and vomiting.   Genitourinary:  Negative for dysuria.   Musculoskeletal:  Negative for joint swelling.   Skin:  Negative for color change.    Neurological:  Negative for dizziness and headaches.   Psychiatric/Behavioral:  Negative for agitation and confusion.        Objective   Vitals: T 96.7 - 3/24/2023 19:21 Route: Forehead (non-contact)  /68 - 3/24/2023 17:02 Position: Sitting r/arm  P 67 - 3/24/2023 17:02 Pulse Type: Regular  R 18.0 - 3/24/2023 17:02  O2 92.0 % - 3/24/2023 17:02 Method:Room Air  .0 - 3/24/2023 17:42    Physical Exam  Constitutional:       General: He is not in acute distress.     Appearance: Normal appearance.   HENT:      Head: Normocephalic and atraumatic.      Nose: No congestion or rhinorrhea.      Mouth/Throat:      Mouth: Mucous membranes are moist.   Eyes:      General: No scleral icterus.     Extraocular Movements: Extraocular movements intact.      Conjunctiva/sclera: Conjunctivae normal.   Cardiovascular:      Rate and Rhythm: Normal rate and regular rhythm.      Heart sounds: No murmur heard.  Pulmonary:      Effort: Pulmonary effort is normal. No respiratory distress.      Breath sounds: Normal breath sounds. No wheezing, rhonchi or rales.   Abdominal:      General: Bowel sounds are normal. There is no distension.      Palpations: Abdomen is soft.      Tenderness: There is no abdominal tenderness. There is no rebound.   Musculoskeletal:         General: No swelling. Normal range of motion.      Right lower leg: No edema.      Left lower leg: No edema.   Skin:     General: Skin is warm and dry.   Neurological:      Mental Status: He is alert and oriented to person, place, and time. Mental status is at baseline.   Psychiatric:         Mood and Affect: Mood normal.         Behavior: Behavior normal.        Lab Results   Component Value Date    WBC 7.7 03/24/2023    HGB 7.9 (L) 03/24/2023    HCT 24.9 (L) 03/24/2023     03/24/2023    CHOL 193 05/18/2022    TRIG 180 (H) 03/17/2023    HDL 56.4 05/18/2022    ALT 44 03/24/2023    AST 61 (H) 03/24/2023     (L) 03/24/2023    K 3.9 03/24/2023    CL 99  03/24/2023    CREATININE 1.31 (H) 03/24/2023    BUN 35 (H) 03/24/2023    CO2 29 03/24/2023    TSH 2.02 02/02/2023    PSA 0.23 12/20/2017    INR 1.5 (H) 03/14/2023    HGBA1C 5.4 05/18/2022       Assessment/Plan     #Physical deconditioning   - C/W PT/OT as tolerate   #Anemia of chronic disease  - s/p blood transfusion  - current Hgb 7.9 on 3/24/23  - continue monitor weekly cbc  #Intra-abdominal abscess  - s/p antibiotics and drain removed   - continue Zosyn until 3/28/23  - per chart reviewed; CT A/P showed decreased size of abscces and drain was removed    - c/w Piperacillin Sodium-Tazobactam Sodium) 3 GM EVERY 6 HOURS. Stop date on 3/28/23       #Abdominal pain   - hx of recent gastric perforation status post repair, history of diverticulitis  - c/w fentaNYL Transdermal Patch 72 Hour 12 MCG/HR  - c/w oxycodone 2.5 mg PO every 6 hours as needed   - c/w Tylenol as needed   #GERD  - C/W Pantoprazole 40 MG po DAILY  - C/W Zofran as needed for N/V  #severe Malnutrition  -- continue regular diet  - dietitian to follow up  #HLD  - c/w Atorvastatin 10 mg PO daily  #Depression  - c/w Citalopram 10 mg PO daily   #Med reviewed  #Lab reviewed     Time spending 45 minutes   -reviewed of hospital record via EMR and reconciled medication in PCC and EMR (d/c summary). Reviewed orders, medications, records, and pertinent labs/x-rays from PCC. Reviewed VS, BS, O2, etc as per protocol. Reviewed and signed off orders, medications, Labs, x-rays, and current diagnoses in PCC  -Obtained history  -Performing physical examination  -Counseling and educating patient's wife for above POC and pain management   -Ordering medications, lab   -Documenting clinical information in the  [facility]   -Care coordinating with nursing staff

## 2023-04-03 NOTE — PROGRESS NOTES
"Late entry;     Subjective   Patient ID: Dakota Pugh is a 83 y.o. male who is acute skilled care and seen for new acute hypotension and orthostatic hypotension and dizziness.    HPI   A 82-year-old man with history of recent gastric perforation status post repair, history of diverticulitis, BPH, HLD, presented from acute rehab with increasing abdominal pain, N/V, CT A/P positive for abdominal fluid collections compatible with abscesses, gastrocutaneous fistula. Patient admitted, started on IV Zosyn, IR guided drains placed-culture showed mixed anaerobes/aerobes, beta-lactamase producers. PICC line placed and started on TPN to allow for bowel rest. Repeat CT with stable/decreased fluid collections, drains in proper position. Patient d/c to  for rehab and continue TPN. Patient was admitted to  during 3/2/23-3/14/23. During rehab stay patient found to have anemia with significantly dropped to 6.6 and required blood transfusion due to patient symptomatic fatigue and weakness. Infectious disease rec' for further work-up.  CT abdomen negative for intraabdominal hemorrhage but showed decreased in size of intraabdominal abscess.  Patient's drain was removed but rec' continue ATB. He received blood transfusion and impressed r/t chronic disease. TPN also discontinued and patient tolerate PO diet. He discharged back to  on 3/24/23 to continue rehab and ATB.     Per nursing staff reported Hypotensive episode in therapy this shift. BP sitting 79/53 pulse 72, lying 105/55, pulse 74. New order to encourage fluids, compression stockings, and Orthostatic BPs for the next 3 days. Wife and \"R\" both made aware, no issues noted  See ROS.   Review of Systems   Constitutional:  Negative for chills, fatigue and fever.   Respiratory:  Negative for cough, shortness of breath and wheezing.    Cardiovascular:  Negative for chest pain, palpitations and leg swelling.   Gastrointestinal:  Negative for abdominal distention, abdominal pain, " constipation, nausea and vomiting.   Genitourinary:  Negative for dysuria.   Musculoskeletal:  Negative for joint swelling.   Skin:  Negative for color change.   Neurological:  Positive for dizziness and light-headedness. Negative for headaches.   Psychiatric/Behavioral:  Negative for agitation and confusion.        Objective   Vitals: T 97.7 - 3/28/2023 14:23 Route: Forehead (non-contact)  /57 - 3/28/2023 14:23 Position: Sitting r/arm  P 63 - 3/28/2023 14:23 Pulse Type: Regular  Character: Normal.  R 18 - 3/28/2023 14:23  O2 98 %- 3/28/2023 14:23 Method: Room Air  BS 92.0 - 3/28/2023 12:31  W 172.8 lb - 3/27/2023 13:42 Scale: Sitting    Physical Exam  Constitutional:       General: He is not in acute distress.     Appearance: Normal appearance.   HENT:      Head: Normocephalic and atraumatic.      Nose: No congestion or rhinorrhea.      Mouth/Throat:      Mouth: Mucous membranes are moist.   Eyes:      General: No scleral icterus.     Extraocular Movements: Extraocular movements intact.      Conjunctiva/sclera: Conjunctivae normal.   Cardiovascular:      Rate and Rhythm: Normal rate and regular rhythm.      Heart sounds: No murmur heard.  Pulmonary:      Effort: Pulmonary effort is normal. No respiratory distress.      Breath sounds: Normal breath sounds. No wheezing, rhonchi or rales.   Abdominal:      General: Bowel sounds are normal. There is no distension.      Palpations: Abdomen is soft.      Tenderness: There is no abdominal tenderness. There is no rebound.   Musculoskeletal:         General: No swelling. Normal range of motion.      Right lower leg: No edema.      Left lower leg: No edema.   Skin:     General: Skin is warm and dry.   Neurological:      Mental Status: He is alert and oriented to person, place, and time. Mental status is at baseline.   Psychiatric:         Mood and Affect: Mood normal.         Behavior: Behavior normal.      Labs on 3/27/23   C-Reactive Protein  5.6 mg/dL <0.9 H Final        CBC       White Blood Cell Count  6.87 k/uL 3.70-11.00  Final           RBC  3.44 m/uL 4.20-6.00 L Final           Hemoglobin  9.7 g/dL 13.0-17.0 L Final           Hematocrit  31.1 % 39.0-51.0 L Final           MCV  90.4 fL 80.0-100.0  Final           MCH  28.2 pg 26.0-34.0  Final           MCHC  31.2 g/dL 30.5-36.0  Final           RDW-CV  17.8 % 11.5-15.0 H Final           Platelet Count  348 k/uL 150-400  Final           MPV  9.2 fL 9.0-12.7  Final           Absolute nRBC  <0.01 k/uL <0.01  Final       Comp Metabolic Panel       Protein, Total  7.4 g/dL 6.3-8.0  Final           Albumin  3.0 g/dL 3.9-4.9 L Final           Calcium, Total  10.6 mg/dL 8.5-10.2 H Final           Bilirubin, Total  0.3 mg/dL 0.2-1.3  Final           Alkaline Phosphatase  424 U/L  H Final           AST  See Below    Final      Unable to assay due to interference from hemolysis. Suggest reorder as clinically indicated.       ALT  36 U/L 10-54  Final           Glucose  75 mg/dL 74-99  Final      The American Diabetes Association (ADA) provides guidance for cutoff values for fasting glucose and random glucose. The ADA defines fasting as no caloric intake for at least 8 hours. Fasting plasma glucose results between 100 to 125 mg/dL indicate increased risk for diabetes (prediabetes).  Fasting plasma glucose results greater than or equal to 126 mg/dL meet the criteria for diagnosis of diabetes. In the absence of unequivocal hyperglycemia, results should be confirmed by repeat testing. In a patient with classic symptoms of hyperglycemia or hyperglycemic crisis, random plasma glucose results greater than or equal to 200 mg/dL meet the criteria for diagnosis of diabetes.  Reference: Standards of Medical Care in Diabetes 2016, American Diabetes Association. Diabetes Care. 2016.39(Suppl 1).       BUN  25 mg/dL 9-24 H Final           Creatinine  1.38 mg/dL 0.73-1.22 H Final           Sodium  134 mmol/L 136-144 L Final            "Potassium  4.6 mmol/L 3.7-5.1  Final           Chloride  97 mmol/L   Final           CO2  21 mmol/L 22-30 L Final           Anion Gap  16 mmol/L 9-18  Final           Estimated Glomerular Filtration Rate  51 mL/min/1.73 meters squared >=60 L Final      Estimated Glomerular Filtration Rate (eGFR) is calculated using the 2021 CKD-EPI creatinine equation. This equation utilizes serum creatinine, sex, and age as parameters. The creatinine assay has traceable calibration to isotope dilution-mass spectrometry. Refer to KDIGO guidelines for clinical interpretation. In patients with unstable renal function, e.g. those with acute kidney injury, the eGFR may not accurately reflect actual GFR.  Assessment/Plan       #Orthostatic hypotension and dizziness        -BP sitting 79/53 pulse 72, lying 105/55, pulse 74 on 3/27/23      -  BP Orthostatics- lying-96/59, sitting- 93/50, standing- 68/48. \"R\" complaints of dizziness      - Changed Furosemide to as needed if weight gain      - Continue monitor orthostatic BP, advised for compression stocking       - Give hydration with 0.9% NSS x 1 liters      - Consider Midodrine if no improvement   #Physical deconditioning   - C/W PT/OT as tolerate   #Anemia of chronic disease  - s/p blood transfusion  - current Hgb 7.9 on 3/24/23-->improves to 9.7 on 3/27/23  - No source of active bleeding   - continue monitor weekly cbc  #Intra-abdominal abscess  - s/p drain removed   - per chart reviewed; CT A/P showed decreased size of abscces and drain was removed    - c/w Piperacillin Sodium-Tazobactam Sodium) 3 GM EVERY 6 HOURS. Stop date on 3/28/23       #Abdominal pain   - hx of recent gastric perforation status post repair, history of diverticulitis  - c/w fentaNYL Transdermal Patch 72 Hour 12 MCG/HR  - c/w oxycodone 2.5 mg PO every 6 hours as needed   - c/w Tylenol as needed    #Med reviewed  #Lab reviewed  "

## 2023-04-03 NOTE — PROGRESS NOTES
Late entry;     Subjective   Patient ID: Dakota Pugh is a 83 y.o. male who is acute skilled care and seen for new acute hypotension and orthostatic hypotension and dizziness.    HPI   A 82-year-old man with history of recent gastric perforation status post repair, history of diverticulitis, BPH, HLD, presented from acute rehab with increasing abdominal pain, N/V, CT A/P positive for abdominal fluid collections compatible with abscesses, gastrocutaneous fistula. Patient admitted, started on IV Zosyn, IR guided drains placed-culture showed mixed anaerobes/aerobes, beta-lactamase producers. PICC line placed and started on TPN to allow for bowel rest. Repeat CT with stable/decreased fluid collections, drains in proper position. Patient d/c to  for rehab and continue TPN. Patient was admitted to  during 3/2/23-3/14/23. During rehab stay patient found to have anemia with significantly dropped to 6.6 and required blood transfusion due to patient symptomatic fatigue and weakness. Infectious disease rec' for further work-up.  CT abdomen negative for intraabdominal hemorrhage but showed decreased in size of intraabdominal abscess.  Patient's drain was removed but rec' continue ATB. He received blood transfusion and impressed r/t chronic disease. TPN also discontinued and patient tolerate PO diet. He discharged back to  on 3/24/23 to continue rehab and ATB.     Patient remains having Orthostatic BP's- lying- 109/61, sitting- 104/62, standing- 81/50 with dizziness. Midodrine started to support blood pressure. Had episode of vomited.  Otherwise he denies chest pain, SOB, F/C/S/N/V.     Objective   Vitals: T 98 - 3/30/2023 19:20 Route: Forehead (non-contact)  /63 - 3/30/2023 19:20 Position: Sitting l/arm  P 69 - 3/30/2023 19:20 Pulse Type: Regular  Character: Normal.  R 18 - 3/30/2023 19:20  O2 98 % -3/30/2023 19:20 Method: Room Air  BS 92.0 - 3/30/2023 17:02  W 175.2 lb - 3/30/2023 15:52 Scale: Sitting  Physical  exam  Constitutional: awake, afebrile, not in acute distress  Eyes: EOMI, clear sclera  ENMT: mucous membranes moist  Respiratory/Thorax: CTAB, no rales/rhonchi/wheezing  Cardiovascular: RRR, no rubs/murmurs/gallops  Gastrointestinal: +BS x4, soft, nt/nd/ng/nr  Musculoskeletal: ROM WNL, normal strength   Extremities: no edema, no cellulitis  Neurological: A&O x3, attention intact, speech fluent   Psychological: Appropriate mood and behavior  Skin: warm and dry, intact     Labs on 3/30/23   CBC       White Blood Cell Count  5.93 k/uL 3.70-11.00  Final           RBC  3.26 m/uL 4.20-6.00 L Final           Hemoglobin  9.0 g/dL 13.0-17.0 L Final           Hematocrit  28.2 % 39.0-51.0 L Final           MCV  86.5 fL 80.0-100.0  Final           MCH  27.6 pg 26.0-34.0  Final           MCHC  31.9 g/dL 30.5-36.0  Final           RDW-CV  17.6 % 11.5-15.0 H Final           Platelet Count  342 k/uL 150-400  Final           MPV  9.2 fL 9.0-12.7  Final           Absolute nRBC  <0.01 k/uL <0.01  Final       Basic Metabolic Panl       Glucose  63 mg/dL 74-99 L Final      The American Diabetes Association (ADA) provides guidance for cutoff values for fasting glucose and random glucose. The ADA defines fasting as no caloric intake for at least 8 hours. Fasting plasma glucose results between 100 to 125 mg/dL indicate increased risk for diabetes (prediabetes).  Fasting plasma glucose results greater than or equal to 126 mg/dL meet the criteria for diagnosis of diabetes. In the absence of unequivocal hyperglycemia, results should be confirmed by repeat testing. In a patient with classic symptoms of hyperglycemia or hyperglycemic crisis, random plasma glucose results greater than or equal to 200 mg/dL meet the criteria for diagnosis of diabetes.  Reference: Standards of Medical Care in Diabetes 2016, American Diabetes Association. Diabetes Care. 2016.39(Suppl 1).       BUN  17 mg/dL 9-24  Final            Creatinine  0.98 mg/dL 0.73-1.22  Final           Sodium  134 mmol/L 136-144 L Final           Potassium  4.0 mmol/L 3.7-5.1  Final           Chloride  100 mmol/L   Final           CO2  21 mmol/L 22-30 L Final           Anion Gap  13 mmol/L 9-18  Final           Calcium, Total  9.9 mg/dL 8.5-10.2  Final           Estimated Glomerular Filtration Rate  77 mL/min/1.73 meters squared >=60  Final      Estimated Glomerular Filtration Rate (eGFR) is calculated using the 2021 CKD-EPI creatinine equation. This equation utilizes serum creatinine, sex, and age as parameters. The creatinine assay has traceable calibration to isotope dilution-mass spectrometry. Refer to KDIGO guidelines for clinical interpretation. In patients with unstable renal function, e.g. those with acute kidney injury, the eGFR may not accurately reflect actual GFR.  Assessment/Plan       #Orthostatic hypotension and dizziness        -Remains having significantly orthoststaic hypotension      - Continue monitor orthostatic BP, advised for compression stocking       - Give hydration with 0.9% NSS x 1 liters      - Started Midodrine 5 mg PO every 8 hours as needed   #Physical deconditioning   - C/W PT/OT as tolerate   #Anemia of chronic disease  - s/p blood transfusion  - current Hgb 7.9 on 3/24/23-->improves to 9.7 on 3/27/23-->9 on 3/30/23  - No source of active bleeding   - continue monitor weekly cbc  #Intra-abdominal abscess  - s/p drain removed   - per chart reviewed; CT A/P showed decreased size of abscces and drain was removed    - Completed Zosyn on 3/28/23  - c/w Zofran as needed   #Med reviewed  #Lab reviewed

## 2023-04-04 ENCOUNTER — NURSING HOME VISIT (OUTPATIENT)
Dept: POST ACUTE CARE | Facility: EXTERNAL LOCATION | Age: 83
End: 2023-04-04
Payer: MEDICARE

## 2023-04-04 DIAGNOSIS — R11.2 INTRACTABLE VOMITING WITH NAUSEA, UNSPECIFIED VOMITING TYPE: ICD-10-CM

## 2023-04-04 DIAGNOSIS — E87.1 HYPONATREMIA: ICD-10-CM

## 2023-04-04 DIAGNOSIS — R10.9 ACUTE ABDOMINAL PAIN: Primary | ICD-10-CM

## 2023-04-04 DIAGNOSIS — K65.1 INTRA-ABDOMINAL ABSCESS (MULTI): ICD-10-CM

## 2023-04-04 PROCEDURE — 99310 SBSQ NF CARE HIGH MDM 45: CPT | Performed by: NURSE PRACTITIONER

## 2023-04-04 NOTE — LETTER
Patient: Dakota Pugh  : 1940    Encounter Date: 2023    Late entry;     Subjective  Patient ID: Dakota Pugh is a 83 y.o. male who is acute skilled care and seen for new acute complaining of abdominal pain, nauseated and vomiting.      HPI   A 82-year-old man with history of recent gastric perforation status post repair, history of diverticulitis, BPH, HLD, presented from acute rehab with increasing abdominal pain, N/V, CT A/P positive for abdominal fluid collections compatible with abscesses, gastrocutaneous fistula. Patient admitted, started on IV Zosyn, IR guided drains placed-culture showed mixed anaerobes/aerobes, beta-lactamase producers. PICC line placed and started on TPN to allow for bowel rest. Repeat CT with stable/decreased fluid collections, drains in proper position. Patient d/c to  for rehab and continue TPN. Patient was admitted to  during 3/2/23-3/14/23. During rehab stay patient found to have anemia with significantly dropped to 6.6 and required blood transfusion due to patient symptomatic fatigue and weakness. Infectious disease rec' for further work-up.  CT abdomen negative for intraabdominal hemorrhage but showed decreased in size of intraabdominal abscess.  Patient's drain was removed but rec' continue ATB. He received blood transfusion and impressed r/t chronic disease. TPN also discontinued and patient tolerate PO diet. He discharged back to  on 3/24/23 to continue rehab and ATB.     Patient reported not feeling well. Had vomiting since last night with diffuse abdominal pain, burning pain.  Had 2 good BM. He denies chest pain, SOB, F/C/S.     Objective  Vitals: T 98.9 - 2023 11:12 Route: Oral  /70 - 2023 11:12 Position: Sitting r/arm  P 91 - 2023 11:12 Pulse Type: Regular  Character: Normal.  R 18.0 - 2023 11:12  O2 94.0 % - 2023 11:12 Method: Room Air  W 171.6 lb - 4/3/2023 15:55 Scale: Sitting  Physical exam  Constitutional: ill appearance,  awake, afebrile, not in acute distress  Eyes:  clear sclera  ENMT: mucous membranes dry  Respiratory/Thorax: CTAB, no rales/rhonchi/wheezing  Cardiovascular: RRR, no rubs/murmurs/gallops  Gastrointestinal: +BS x4, soft, nt/nd/ng/nr  Musculoskeletal: Move all extremities   Extremities: no edema, no cellulitis  Neurological: A&O x3, attention intact, speech fluent   Psychological: Appropriate mood and behavior  Skin: warm and dry, intact     Labs on 4/3/23   CBC       White Blood Cell Count  5.96 k/uL 3.70-11.00  Final           RBC  3.25 m/uL 4.20-6.00 L Final           Hemoglobin  9.5 g/dL 13.0-17.0 L Final           Hematocrit  28.6 % 39.0-51.0 L Final           MCV  88.0 fL 80.0-100.0  Final           MCH  29.2 pg 26.0-34.0  Final           MCHC  33.2 g/dL 30.5-36.0  Final           RDW-CV  18.5 % 11.5-15.0 H Final           Platelet Count  322 k/uL 150-400  Final           MPV  8.8 fL 9.0-12.7 L Final           Absolute nRBC  <0.01 k/uL <0.01  Final       Comp Metabolic Panel       Protein, Total  7.1 g/dL 6.3-8.0  Final           Albumin  3.1 g/dL 3.9-4.9 L Final           Calcium, Total  10.0 mg/dL 8.5-10.2  Final           Bilirubin, Total  0.3 mg/dL 0.2-1.3  Final           Alkaline Phosphatase  289 U/L  H Final           AST  40 U/L 14-40  Final           ALT  18 U/L 10-54  Final           Glucose  76 mg/dL 74-99  Final      The American Diabetes Association (ADA) provides guidance for cutoff values for fasting glucose and random glucose. The ADA defines fasting as no caloric intake for at least 8 hours. Fasting plasma glucose results between 100 to 125 mg/dL indicate increased risk for diabetes (prediabetes).  Fasting plasma glucose results greater than or equal to 126 mg/dL meet the criteria for diagnosis of diabetes. In the absence of unequivocal hyperglycemia, results should be confirmed by repeat testing. In a patient with classic symptoms of hyperglycemia or hyperglycemic crisis, random plasma  glucose results greater than or equal to 200 mg/dL meet the criteria for diagnosis of diabetes.  Reference: Standards of Medical Care in Diabetes 2016, American Diabetes Association. Diabetes Care. 2016.39(Suppl 1).       BUN  16 mg/dL 9-24  Final           Creatinine  0.94 mg/dL 0.73-1.22  Final           Sodium  133 mmol/L 136-144 L Final           Potassium  4.1 mmol/L 3.7-5.1  Final           Chloride  98 mmol/L   Final           CO2  20 mmol/L 22-30 L Final           Anion Gap  15 mmol/L 9-18  Final           Estimated Glomerular Filtration Rate  80 mL/min/1.73 meters squared >=60  Final      Estimated Glomerular Filtration Rate (eGFR) is calculated using the 2021 CKD-EPI creatinine equation. This equation utilizes serum creatinine, sex, and age as parameters. The creatinine assay has traceable calibration to isotope dilution-mass spectrometry. Refer to KDIGO guidelines for clinical interpretation. In patients with unstable renal function, e.g. those with acute kidney injury, the eGFR may not accurately reflect actual GFR.   C-Reactive Protein  3.2 mg/dL <0.9 H Final      Assessment/Plan  #Acute abdominal pain  #Intractable vomiting and nausea  - KUB reviewed and showed unremarkable or no acute finding  - Changed diet to clear liquid diet  - Started Zofran 8 mg PO TID  - Started IVF with sodium 0.9% NSS for hydration  - Repeat cbc, BMP this evening  - repeat KUB in AM  #Intra abdominal abscess   - s/p drain removed   - per chart reviewed; CT A/P showed decreased size of abscces and drain was removed    - Completed Zosyn on 3/28/23  - c/w Zofran as needed   - Lab reviewed and no leukocytosis  - Afebrile and CRP trending down to 3.8   - continue monitor   #Hyponatremia   - 2/2 hypovolemia given poor PO intake  - Give hydration with 0.9%NSS  #Med reviewed  #Lab reviewed    Addendum; patient continues having vomiting with nausea and requested for going to a hospital. [facility] was [facility] to [facility]     Time  spending 55 minutes   -Reviewed orders, medications, records, and pertinent labs/x-rays from [facility]. Reviewed VS, BS, O2, etc as per protocol. Reviewed and signed off orders, medications, Labs, x-rays, and current diagnoses in [facility]  -Obtained history  -Performing physical examination  -Counseling and educating patient for above POC   -Ordering medications, lab   -Documenting clinical information in the [facility]   -Care coordinating with nursing staff       Electronically Signed By: TEAGAN Chapman   4/5/23  9:48 PM

## 2023-04-05 PROBLEM — E87.1 HYPONATREMIA: Status: ACTIVE | Noted: 2023-04-05

## 2023-04-05 PROBLEM — R10.9 ACUTE ABDOMINAL PAIN: Status: ACTIVE | Noted: 2023-04-05

## 2023-04-05 PROBLEM — R11.2 INTRACTABLE VOMITING WITH NAUSEA: Status: ACTIVE | Noted: 2023-04-05

## 2023-04-06 NOTE — ASSESSMENT & PLAN NOTE
- s/p drain removed   - per chart reviewed; CT A/P showed decreased size of abscces and drain was removed    - Completed Zosyn on 3/28/23  - c/w Zofran as needed   #Med reviewed  #Lab reviewed

## 2023-04-06 NOTE — PROGRESS NOTES
Late entry;     Subjective   Patient ID: Dakota Pugh is a 83 y.o. male who is acute skilled care and seen for new acute complaining of abdominal pain, nauseated and vomiting.      HPI   A 82-year-old man with history of recent gastric perforation status post repair, history of diverticulitis, BPH, HLD, presented from acute rehab with increasing abdominal pain, N/V, CT A/P positive for abdominal fluid collections compatible with abscesses, gastrocutaneous fistula. Patient admitted, started on IV Zosyn, IR guided drains placed-culture showed mixed anaerobes/aerobes, beta-lactamase producers. PICC line placed and started on TPN to allow for bowel rest. Repeat CT with stable/decreased fluid collections, drains in proper position. Patient d/c to  for rehab and continue TPN. Patient was admitted to  during 3/2/23-3/14/23. During rehab stay patient found to have anemia with significantly dropped to 6.6 and required blood transfusion due to patient symptomatic fatigue and weakness. Infectious disease rec' for further work-up.  CT abdomen negative for intraabdominal hemorrhage but showed decreased in size of intraabdominal abscess.  Patient's drain was removed but rec' continue ATB. He received blood transfusion and impressed r/t chronic disease. TPN also discontinued and patient tolerate PO diet. He discharged back to  on 3/24/23 to continue rehab and ATB.     Patient reported not feeling well. Had vomiting since last night with diffuse abdominal pain, burning pain.  Had 2 good BM. He denies chest pain, SOB, F/C/S.     Objective   Vitals: T 98.9 - 4/4/2023 11:12 Route: Oral  /70 - 4/4/2023 11:12 Position: Sitting r/arm  P 91 - 4/4/2023 11:12 Pulse Type: Regular  Character: Normal.  R 18.0 - 4/4/2023 11:12  O2 94.0 % - 4/4/2023 11:12 Method: Room Air  W 171.6 lb - 4/3/2023 15:55 Scale: Sitting  Physical exam  Constitutional: ill appearance, awake, afebrile, not in acute distress  Eyes:  clear sclera  ENMT:  mucous membranes dry  Respiratory/Thorax: CTAB, no rales/rhonchi/wheezing  Cardiovascular: RRR, no rubs/murmurs/gallops  Gastrointestinal: +BS x4, soft, nt/nd/ng/nr  Musculoskeletal: Move all extremities   Extremities: no edema, no cellulitis  Neurological: A&O x3, attention intact, speech fluent   Psychological: Appropriate mood and behavior  Skin: warm and dry, intact     Labs on 4/3/23   CBC       White Blood Cell Count  5.96 k/uL 3.70-11.00  Final           RBC  3.25 m/uL 4.20-6.00 L Final           Hemoglobin  9.5 g/dL 13.0-17.0 L Final           Hematocrit  28.6 % 39.0-51.0 L Final           MCV  88.0 fL 80.0-100.0  Final           MCH  29.2 pg 26.0-34.0  Final           MCHC  33.2 g/dL 30.5-36.0  Final           RDW-CV  18.5 % 11.5-15.0 H Final           Platelet Count  322 k/uL 150-400  Final           MPV  8.8 fL 9.0-12.7 L Final           Absolute nRBC  <0.01 k/uL <0.01  Final       Comp Metabolic Panel       Protein, Total  7.1 g/dL 6.3-8.0  Final           Albumin  3.1 g/dL 3.9-4.9 L Final           Calcium, Total  10.0 mg/dL 8.5-10.2  Final           Bilirubin, Total  0.3 mg/dL 0.2-1.3  Final           Alkaline Phosphatase  289 U/L  H Final           AST  40 U/L 14-40  Final           ALT  18 U/L 10-54  Final           Glucose  76 mg/dL 74-99  Final      The American Diabetes Association (ADA) provides guidance for cutoff values for fasting glucose and random glucose. The ADA defines fasting as no caloric intake for at least 8 hours. Fasting plasma glucose results between 100 to 125 mg/dL indicate increased risk for diabetes (prediabetes).  Fasting plasma glucose results greater than or equal to 126 mg/dL meet the criteria for diagnosis of diabetes. In the absence of unequivocal hyperglycemia, results should be confirmed by repeat testing. In a patient with classic symptoms of hyperglycemia or hyperglycemic crisis, random plasma glucose results greater than or equal to 200 mg/dL meet the criteria  for diagnosis of diabetes.  Reference: Standards of Medical Care in Diabetes 2016, American Diabetes Association. Diabetes Care. 2016.39(Suppl 1).       BUN  16 mg/dL 9-24  Final           Creatinine  0.94 mg/dL 0.73-1.22  Final           Sodium  133 mmol/L 136-144 L Final           Potassium  4.1 mmol/L 3.7-5.1  Final           Chloride  98 mmol/L   Final           CO2  20 mmol/L 22-30 L Final           Anion Gap  15 mmol/L 9-18  Final           Estimated Glomerular Filtration Rate  80 mL/min/1.73 meters squared >=60  Final      Estimated Glomerular Filtration Rate (eGFR) is calculated using the 2021 CKD-EPI creatinine equation. This equation utilizes serum creatinine, sex, and age as parameters. The creatinine assay has traceable calibration to isotope dilution-mass spectrometry. Refer to KDIGO guidelines for clinical interpretation. In patients with unstable renal function, e.g. those with acute kidney injury, the eGFR may not accurately reflect actual GFR.   C-Reactive Protein  3.2 mg/dL <0.9 H Final      Assessment/Plan   #Acute abdominal pain  #Intractable vomiting and nausea  - KUB reviewed and showed unremarkable or no acute finding  - Changed diet to clear liquid diet  - Started Zofran 8 mg PO TID  - Started IVF with sodium 0.9% NSS for hydration  - Repeat cbc, BMP this evening  - repeat KUB in AM  #Intra abdominal abscess   - s/p drain removed   - per chart reviewed; CT A/P showed decreased size of abscces and drain was removed    - Completed Zosyn on 3/28/23  - c/w Zofran as needed   - Lab reviewed and no leukocytosis  - Afebrile and CRP trending down to 3.8   - continue monitor   #Hyponatremia   - 2/2 hypovolemia given poor PO intake  - Give hydration with 0.9%NSS  #Med reviewed  #Lab reviewed    Addendum; patient continues having vomiting with nausea and requested for going to a hospital. [facility] was [facility] to [facility]     Time spending 55 minutes   -Reviewed orders, medications, records, and  pertinent labs/x-rays from [facility]. Reviewed VS, BS, O2, etc as per protocol. Reviewed and signed off orders, medications, Labs, x-rays, and current diagnoses in [facility]  -Obtained history  -Performing physical examination  -Counseling and educating patient for above POC   -Ordering medications, lab   -Documenting clinical information in the [facility]   -Care coordinating with nursing staff

## 2023-04-06 NOTE — ASSESSMENT & PLAN NOTE
- KUB reviewed and showed unremarkable or no acute finding  - Changed diet to clear liquid diet  - Started Zofran 8 mg PO TID  - Started IVF with sodium 0.9% NSS for hydration  - Repeat cbc, BMP this evening  - repeat KUB in AM

## 2023-04-09 ENCOUNTER — NURSING HOME VISIT (OUTPATIENT)
Dept: POST ACUTE CARE | Facility: EXTERNAL LOCATION | Age: 83
End: 2023-04-09
Payer: MEDICARE

## 2023-04-09 DIAGNOSIS — K57.32 DIVERTICULITIS OF COLON: ICD-10-CM

## 2023-04-09 DIAGNOSIS — N17.9 AKI (ACUTE KIDNEY INJURY) (CMS-HCC): ICD-10-CM

## 2023-04-09 DIAGNOSIS — K31.6 GASTROCUTANEOUS FISTULA: ICD-10-CM

## 2023-04-09 DIAGNOSIS — F33.41 RECURRENT MAJOR DEPRESSIVE DISORDER, IN PARTIAL REMISSION (CMS-HCC): ICD-10-CM

## 2023-04-09 DIAGNOSIS — D72.825 BANDEMIA: ICD-10-CM

## 2023-04-09 DIAGNOSIS — Z78.9 ON TOTAL PARENTERAL NUTRITION (TPN): ICD-10-CM

## 2023-04-09 DIAGNOSIS — R10.84 GENERALIZED ABDOMINAL PAIN: ICD-10-CM

## 2023-04-09 DIAGNOSIS — K65.1 INTRA-ABDOMINAL ABSCESS (MULTI): Primary | ICD-10-CM

## 2023-04-09 DIAGNOSIS — R53.81 PHYSICAL DECONDITIONING: ICD-10-CM

## 2023-04-09 DIAGNOSIS — E43 SEVERE PROTEIN-CALORIE MALNUTRITION (MULTI): ICD-10-CM

## 2023-04-09 PROCEDURE — 99306 1ST NF CARE HIGH MDM 50: CPT | Performed by: FAMILY MEDICINE

## 2023-04-09 NOTE — ASSESSMENT & PLAN NOTE
Intra-abdominal Abscess treated with Zosyn and IR drained 5 ml fluid off gastric fundus/ Wbc improved Augmentin for 7 days Monitor s/s and WBC

## 2023-04-09 NOTE — LETTER
Patient: Dakota Pugh  : 1940    Encounter Date: 2023    Acute visit  Problem List Items Addressed This Visit          Nervous    Abdominal pain     Intra-abdominal Abscess treated with Zosyn and IR drained 5 ml fluid off gastric fundus/ Wbc improved Augmentin for 7 days Monitor s/s and WBC            Digestive    Diverticulitis of colon     Intra-abdominal Abscess treated with Zosyn and IR drained 5 ml fluid off gastric fundus/ Wbc improved Augmentin for 7 days Monitor s/s and WBC         Intra-abdominal abscess (CMS/HCC) - Primary     Intra-abdominal Abscess treated with Zosyn and IR drained 5 ml fluid off gastric fundus/ Wbc improved Augmentin for 7 days Monitor s/s and WBC         Gastrocutaneous fistula     C/w TPN             Genitourinary    CINDI (acute kidney injury) (CMS/HCC)     Nitor RF. Maintain hydration            Endocrine/Metabolic    Severe protein-calorie malnutrition (CMS/HCC)     C/w TPN            Hematologic    Elevated WBC count     C/w ATb            Other    Physical deconditioning    On total parenteral nutrition (TPN)    Recurrent major depressive disorder, in partial remission (CMS/HCC)     C/w meds. GDR not recommended            PLAN:Reviewed orders, medications, records, and pertinent labs/x-rays from   hospital. Monitor VS, BS, O2, etc as per protocol. See written orders. PT/OT   will evaluate and start appropriate rehabilitation program. Reviewed and signed   off orders, medications,Labs, x-rays, and current diagnoses. Reviewed and   updated CPR status and any changes in Advanced directives. Continue Rehab Will   see 1-2 times weekly for next 30 days then reassess. Will always see at   resident, family , or nursing request. Discharge Planning   Time   Time Spent With Patient: 65 minutes of which greater than 50 percent was spent   counseling and or coordinating care.        Intra-abdominal Abscess treated with Zosyn and IR drained 5 ml fluid off gastric fundus/ Wbc  improved Augmentin for 7 days Monitor s/s and WBC  Anemia Patient was given 1 U PRBC in hospital. Will check cbc bmp 1-2 times a week.     #Physical deconditioning  - C/W PT/OT as tolerated  #Leukocytosis and fever; 2/2 abdominal abscess on ATB therapy. Will trend cbc weekly. Tylenol as needed  #Abdominal abscesses  #Gastrocutaneous fistula  #Status post IR drain placement with ABDI drain; drain care as ordered  - leukocytosis stable continue to monitor  - per chart reviewed; CT A/P with resolution/decreased in fluid collection.  - If leukocytosis persistent, will check UA; trend wbc/temps for now given patient is known case abdominal abscess and gastrocutaneous fistula  - c/w Piperacillin Sodium-Tazobactam Sodium) 3 GM EVERY 6 HOURS  -resumed home med per discharge summary reconcile medications.  - OP follow up with surgery and ID  -Patient's wife requested palliative care following for pain control. Order placed a  -Lab Test(s): cbc, cmp, crp weekly while on zosyn (Fax Results To: facility attending and Dr Vasques @ [phone]  - Continue monitor  #Abdominal pain  - hx of recent gastric perforation status post repair, history of diverticulitis  - c/w fentaNYL Transdermal Patch 72 Hour 12 MCG/HR  - c/w oxycodone 5 mg PO every 6 hours as needed  - c/w Tylenol as needed  #GERD  - C/W Pantoprazole 40 MG po DAILY  - C/W Zofran as needed for N/V  #CINDI  - hx of Fluid overload and Bilateral pleural effusions  -continue PO Lasix, wean O2 TO MAINTAIN O2SAT >90%  #Malnutrition  - NPO status  - Continue TPN  #moisture associated dermatitis on buttock  - c/w Triad cream (or A&D ointment) in dime thickness daily to  irritated areas.  #HLD  - c/w Atorvastatin 10 mg PO daily  #Depression  - c/w Citalopram 10 mg PO daily  -reviewed of hospital record via EMR and reconciled medication in [facility] and EMR (d/c summary). Reviewed orders, medications, records, and pertinent labs/x-rays from [facility]. Reviewed VS, BS, O2, etc as per protocol.        Chief Complaint  This is an acute visit for readmission from hospital 2/2 Anemia     History of Present Illness  A 82-year-old man with history of recent gastric perforation status post repair, history of diverticulitis, BPH, HLD, presented from acute rehab with increasing abdominal pain, N/V, CT A/P positive for abdominal fluid collections compatible with abscesses, gastrocutaneous fistula. Patient admitted, started on IV Zosyn, IR guided drains placed-culture showed mixed anaerobes/aerobes, beta-lactamase producers. PICC line placed and started on TPN to allow for bowel rest. Repeat CT with stable/decreased fluid collections, drains in proper position. [facility] d/c to [facility] for [facility] and continue TPN.  Patient was sent ot ER for Abd pain and leucocytosis . 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) (M19.011)  · At risk for constipation (V49.89) (Z91.89)  · Bilateral shoulderpain (719.41) (M25.511,M25.512)  · Bladder cancer (188.9) (C67.9)  · Bladder cancer (188.9) (C67.9)  · Carpal tunnel syndrome, bilateral upper limbs (354.0) (G56.03)  · Chest pain (786.50) (R07.9)  · 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)  · Chronic pain of left lower extremity (729.5,338.29) (M79.605,G89.29)  · Combined form of age-related cataract, 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) (H25.813)  · Cortical age-related cataract of left eye (366.15) (H25.012)  · Cortical age-related cataract of right eye (366.15) (H25.011)  · Cyst, kidney,  acquired (593.2) (N28.1)  · Dermatochalasis (374.87) (H02.839)  · Diverticulitis of colon (562.11) (K57.32)  · Dizziness (780.4) (R42)  · 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)  · EKG, abnormal (794.31) (R94.31)  · Elevated alkaline phosphatase level (790.5) (R74.8)  · Elevated glucose (790.29) (R73.09)  · Encounter for prostate cancer screening (V76.44) (Z12.5)  · 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.01)  · Fatigue (780.79) (R53.83)  ·Femur fracture, left (821.00) (S72.92XA)  · Fracture of shaft of right femur with routine healing (V54.15) (S72.301D)  · Groin pain (789.09) (R10.30)  · Hip pain, bilateral (719.45) (M25.551,M25.552)  · Hyperlipidemia (272.4) (E78.5)  · Iliotibial band tendinitis of right side (728.89) (M76.31)  · Knee pain (719.46) (M25.569)  · Left shoulder pain, unspecified chronicity (719.41) (M25.512)  · Leg pain (729.5) (M79.606)  · Leg pain (729.5) (M79.606)  · Limb pain (729.5) (M79.609)  · Liver cyst (573.8) (K76.89)  · Lower back pain (724.2) (M54.5)  · Lower back pain (724.2) (M54.50)  · Lumbar neuritis (724.4) (M54.16)  · Mixed type age-related cataract, left eye (366.19) (H25.812)  · Mixed type age-related cataract, right eye (366.19) (H25.811)  · Myopia with presbyopia (367.1,367.4) (H52.10,H52.4)  · Need for prophylactic antibiotic (V58.62) (Z79.2)  · Neuropathy (355.9) (G62.9)  · Numbness and tingling of hand (782.0) (R20.0,R20.2)  · Osteoporosis (733.00) (M81.0)  · Overweight with body mass index (BMI) of 28 to 28.9 in adult (278.02,V85.24)  (E66.3,Z68.28)  · Pain of left lower extremity (729.5) (M79.605)  · Pain of lower leg (729.5) (M79.669)  · Renal cyst (753.10) (N28.1)  · Right shoulder pain, unspecified chronicity (719.41)(M25.511)  · Shoulder arthritis (716.91) (M19.019)  · SOB (shortness of breath) (786.05) (R06.02)  · 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)  · Status post total replacement of right shoulder (V43.61) (Z96.611)  ·  Tubular adenoma of colon (211.3) (D12.6)  · Vitreous floaters (379.24) (H43.399)  Past Medical History  Problems  · History of hyperlipidemia (V12.29) (Z86.39)  · History of malignant neoplasm of bladder (V10.51) (Z85.51)  · History of shortness of breath (V13.89) (Z87.898)  · Resolved Date: 22 Feb 2021  · History of Myalgia and myositis (729.1)  Surgical History  Problems  · History of Bladder Surgery  · History of Shoulder replacement  · History of Tonsillectomy With Adenoidectomy  · History of Total Hip Replacement  Family History  Mother  · Family history of Breast Cancer (V16.3)  Social History  Problems  · Being A Social Drinker  · Caffeine Use  · Exercising Regularly  · Former smoker (V15.82) (Z87.891)  · Marital History - Currently   · Non-smoker (V49.89) (Z78.9)  Allergies  Medication  · No Known Drug Allergies  Recorded By: Meenu Olsen; 12/11/2013 10:25:31 AM  Vitals    9 118 / 59 mmHg Sitting r/arm  11:29 66 bpm Regular   16 Breaths/min  99.0 mg/dL  11:29 93.0 % Oxygen via Nasal Cannula  Physical Exam  Physical exam  Constitutional: Patient is asleep, resting quietly in bed but does arouse to verbal stimuli, and A&Ox 1 (can state name), afebrile, not in acute distress  Eyes: clear sclera  ENMT: mucous membranes moist  Head/Neck: neck supple, trachea midline, no JVD  Respiratory/Thorax: CTAB, no rales/rhonchi/wheezing  Cardiovascular: RRR, no rubs/murmurs/gallops  Gastrointestinal: +BS x4, soft, nt/nd/ng/nr. Drain intact  Musculoskeletal: MCLEOD  Extremities: no edema, no cellulitis  Neurological: A&O x2, sleepy but eyes open with verbal  Psychological: Appropriate mood and behavior  Skin: warm and dry,intact      Electronically Signed By: Elver Nuno MD   4/9/23  7:32 PM

## 2023-04-09 NOTE — PROGRESS NOTES
Acute visit  Problem List Items Addressed This Visit          Nervous    Abdominal pain     Intra-abdominal Abscess treated with Zosyn and IR drained 5 ml fluid off gastric fundus/ Wbc improved Augmentin for 7 days Monitor s/s and WBC            Digestive    Diverticulitis of colon     Intra-abdominal Abscess treated with Zosyn and IR drained 5 ml fluid off gastric fundus/ Wbc improved Augmentin for 7 days Monitor s/s and WBC         Intra-abdominal abscess (CMS/HCC) - Primary     Intra-abdominal Abscess treated with Zosyn and IR drained 5 ml fluid off gastric fundus/ Wbc improved Augmentin for 7 days Monitor s/s and WBC         Gastrocutaneous fistula     C/w TPN             Genitourinary    CINDI (acute kidney injury) (CMS/HCC)     Nitor RF. Maintain hydration            Endocrine/Metabolic    Severe protein-calorie malnutrition (CMS/HCC)     C/w TPN            Hematologic    Elevated WBC count     C/w ATb            Other    Physical deconditioning    On total parenteral nutrition (TPN)    Recurrent major depressive disorder, in partial remission (CMS/HCC)     C/w meds. GDR not recommended            PLAN:Reviewed orders, medications, records, and pertinent labs/x-rays from   hospital. Monitor VS, BS, O2, etc as per protocol. See written orders. PT/OT   will evaluate and start appropriate rehabilitation program. Reviewed and signed   off orders, medications,Labs, x-rays, and current diagnoses. Reviewed and   updated CPR status and any changes in Advanced directives. Continue Rehab Will   see 1-2 times weekly for next 30 days then reassess. Will always see at   resident, family , or nursing request. Discharge Planning   Time   Time Spent With Patient: 65 minutes of which greater than 50 percent was spent   counseling and or coordinating care.        Intra-abdominal Abscess treated with Zosyn and IR drained 5 ml fluid off gastric fundus/ Wbc improved Augmentin for 7 days Monitor s/s and WBC  Anemia Patient was given  1 U PRBC in hospital. Will check cbc bmp 1-2 times a week.     #Physical deconditioning  - C/W PT/OT as tolerated  #Leukocytosis and fever; 2/2 abdominal abscess on ATB therapy. Will trend cbc weekly. Tylenol as needed  #Abdominal abscesses  #Gastrocutaneous fistula  #Status post IR drain placement with ABDI drain; drain care as ordered  - leukocytosis stable continue to monitor  - per chart reviewed; CT A/P with resolution/decreased in fluid collection.  - If leukocytosis persistent, will check UA; trend wbc/temps for now given patient is known case abdominal abscess and gastrocutaneous fistula  - c/w Piperacillin Sodium-Tazobactam Sodium) 3 GM EVERY 6 HOURS  -resumed home med per discharge summary reconcile medications.  - OP follow up with surgery and ID  -Patient's wife requested palliative care following for pain control. Order placed a  -Lab Test(s): cbc, cmp, crp weekly while on zosyn (Fax Results To: facility attending and Dr Vasques @ [phone]  - Continue monitor  #Abdominal pain  - hx of recent gastric perforation status post repair, history of diverticulitis  - c/w fentaNYL Transdermal Patch 72 Hour 12 MCG/HR  - c/w oxycodone 5 mg PO every 6 hours as needed  - c/w Tylenol as needed  #GERD  - C/W Pantoprazole 40 MG po DAILY  - C/W Zofran as needed for N/V  #CINDI  - hx of Fluid overload and Bilateral pleural effusions  -continue PO Lasix, wean O2 TO MAINTAIN O2SAT >90%  #Malnutrition  - NPO status  - Continue TPN  #moisture associated dermatitis on buttock  - c/w Triad cream (or A&D ointment) in dime thickness daily to  irritated areas.  #HLD  - c/w Atorvastatin 10 mg PO daily  #Depression  - c/w Citalopram 10 mg PO daily  -reviewed of hospital record via EMR and reconciled medication in [facility] and EMR (d/c summary). Reviewed orders, medications, records, and pertinent labs/x-rays from [facility]. Reviewed VS, BS, O2, etc as per protocol.       Chief Complaint  This is an acute visit for readmission from hospital 2/2  Anemia     History of Present Illness  A 82-year-old man with history of recent gastric perforation status post repair, history of diverticulitis, BPH, HLD, presented from acute rehab with increasing abdominal pain, N/V, CT A/P positive for abdominal fluid collections compatible with abscesses, gastrocutaneous fistula. Patient admitted, started on IV Zosyn, IR guided drains placed-culture showed mixed anaerobes/aerobes, beta-lactamase producers. PICC line placed and started on TPN to allow for bowel rest. Repeat CT with stable/decreased fluid collections, drains in proper position. [facility] d/c to [facility] for [facility] and continue TPN.  Patient was sent ot ER for Abd pain and leucocytosis . 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) (M19.011)  · At risk for constipation (V49.89) (Z91.89)  · Bilateral shoulderpain (719.41) (M25.511,M25.512)  · Bladder cancer (188.9) (C67.9)  · Bladder cancer (188.9) (C67.9)  · Carpal tunnel syndrome, bilateral upper limbs (354.0) (G56.03)  · Chest pain (786.50) (R07.9)  · 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)  · Chronic pain of left lower extremity (729.5,338.29) (M79.605,G89.29)  · Combined form of age-related cataract, 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) (H25.813)  · Cortical age-related cataract of left eye (366.15) (H25.012)  · Cortical age-related cataract of right eye (366.15) (H25.011)  · Cyst, kidney, acquired (593.2) (N28.1)  · Dermatochalasis (374.87) (H02.839)  · Diverticulitis of  colon (562.11) (K57.32)  · Dizziness (780.4) (R42)  · 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)  · EKG, abnormal (794.31) (R94.31)  · Elevated alkaline phosphatase level (790.5) (R74.8)  · Elevated glucose (790.29) (R73.09)  · Encounter for prostate cancer screening (V76.44) (Z12.5)  · 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.01)  · Fatigue (780.79) (R53.83)  ·Femur fracture, left (821.00) (S72.92XA)  · Fracture of shaft of right femur with routine healing (V54.15) (S72.301D)  · Groin pain (789.09) (R10.30)  · Hip pain, bilateral (719.45) (M25.551,M25.552)  · Hyperlipidemia (272.4) (E78.5)  · Iliotibial band tendinitis of right side (728.89) (M76.31)  · Knee pain (719.46) (M25.569)  · Left shoulder pain, unspecified chronicity (719.41) (M25.512)  · Leg pain (729.5) (M79.606)  · Leg pain (729.5) (M79.606)  · Limb pain (729.5) (M79.609)  · Liver cyst (573.8) (K76.89)  · Lower back pain (724.2) (M54.5)  · Lower back pain (724.2) (M54.50)  · Lumbar neuritis (724.4) (M54.16)  · Mixed type age-related cataract, left eye (366.19) (H25.812)  · Mixed type age-related cataract, right eye (366.19) (H25.811)  · Myopia with presbyopia (367.1,367.4) (H52.10,H52.4)  · Need for prophylactic antibiotic (V58.62) (Z79.2)  · Neuropathy (355.9) (G62.9)  · Numbness and tingling of hand (782.0) (R20.0,R20.2)  · Osteoporosis (733.00) (M81.0)  · Overweight with body mass index (BMI) of 28 to 28.9 in adult (278.02,V85.24)  (E66.3,Z68.28)  · Pain of left lower extremity (729.5) (M79.605)  · Pain of lower leg (729.5) (M79.669)  · Renal cyst (753.10) (N28.1)  · Right shoulder pain, unspecified chronicity (719.41)(M25.511)  · Shoulder arthritis (716.91) (M19.019)  · SOB (shortness of breath) (786.05) (R06.02)  · 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)  · Status post total replacement of right shoulder (V43.61) (Z96.611)  · Tubular adenoma of colon (211.3) (D12.6)  · Vitreous floaters (379.24) (H43.399)  Past  Medical History  Problems  · History of hyperlipidemia (V12.29) (Z86.39)  · History of malignant neoplasm of bladder (V10.51) (Z85.51)  · History of shortness of breath (V13.89) (Z87.898)  · Resolved Date: 22 Feb 2021  · History of Myalgia and myositis (729.1)  Surgical History  Problems  · History of Bladder Surgery  · History of Shoulder replacement  · History of Tonsillectomy With Adenoidectomy  · History of Total Hip Replacement  Family History  Mother  · Family history of Breast Cancer (V16.3)  Social History  Problems  · Being A Social Drinker  · Caffeine Use  · Exercising Regularly  · Former smoker (V15.82) (Z87.891)  · Marital History - Currently   · Non-smoker (V49.89) (Z78.9)  Allergies  Medication  · No Known Drug Allergies  Recorded By: eMenu Olsen; 12/11/2013 10:25:31 AM  Vitals    9 118 / 59 mmHg Sitting r/arm  11:29 66 bpm Regular   16 Breaths/min  99.0 mg/dL  11:29 93.0 % Oxygen via Nasal Cannula  Physical Exam  Physical exam  Constitutional: Patient is asleep, resting quietly in bed but does arouse to verbal stimuli, and A&Ox 1 (can state name), afebrile, not in acute distress  Eyes: clear sclera  ENMT: mucous membranes moist  Head/Neck: neck supple, trachea midline, no JVD  Respiratory/Thorax: CTAB, no rales/rhonchi/wheezing  Cardiovascular: RRR, no rubs/murmurs/gallops  Gastrointestinal: +BS x4, soft, nt/nd/ng/nr. Drain intact  Musculoskeletal: MCLEOD  Extremities: no edema, no cellulitis  Neurological: A&O x2, sleepy but eyes open with verbal  Psychological: Appropriate mood and behavior  Skin: warm and dry,intact

## 2023-04-10 ENCOUNTER — NURSING HOME VISIT (OUTPATIENT)
Dept: POST ACUTE CARE | Facility: EXTERNAL LOCATION | Age: 83
End: 2023-04-10
Payer: MEDICARE

## 2023-04-10 DIAGNOSIS — E87.1 HYPONATREMIA: ICD-10-CM

## 2023-04-10 DIAGNOSIS — K31.6 GASTROCUTANEOUS FISTULA: ICD-10-CM

## 2023-04-10 DIAGNOSIS — R11.2 INTRACTABLE VOMITING WITH NAUSEA: Primary | ICD-10-CM

## 2023-04-10 DIAGNOSIS — R10.84 GENERALIZED ABDOMINAL PAIN: ICD-10-CM

## 2023-04-10 DIAGNOSIS — E78.5 HYPERLIPIDEMIA, UNSPECIFIED HYPERLIPIDEMIA TYPE: ICD-10-CM

## 2023-04-10 DIAGNOSIS — K65.1 INTRA-ABDOMINAL ABSCESS (MULTI): ICD-10-CM

## 2023-04-10 PROCEDURE — 99309 SBSQ NF CARE MODERATE MDM 30: CPT | Performed by: NURSE PRACTITIONER

## 2023-04-10 NOTE — LETTER
Patient: Dakota Pugh  : 1940    Encounter Date: 04/10/2023    Late entry;     Subjective  Patient ID: Dakota Pugh is a 83 y.o. male who is acute skilled care and seen after hospitalization for abdominal pain and intractable vomiting     HPI   A 82-year-old man with history of recent gastric perforation status post repair, history of diverticulitis, BPH, HLD, presented from acute rehab with increasing abdominal pain, N/V, CT A/P positive for abdominal fluid collections compatible with abscesses, gastrocutaneous fistula. Patient admitted, started on IV Zosyn, IR guided drains placed-culture showed mixed anaerobes/aerobes, beta-lactamase producers. PICC line placed and started on TPN to allow for bowel rest. Repeat CT with stable/decreased fluid collections, drains in proper position. Patient d/c to  for rehab and continue TPN. Patient was admitted to  during 3/2/23-3/14/23. During rehab stay patient found to have anemia with significantly dropped to 6.6 and required blood transfusion due to patient symptomatic fatigue and weakness. Infectious disease rec' for further work-up.  CT abdomen negative for intraabdominal hemorrhage but showed decreased in size of intraabdominal abscess.  Patient's drain was removed but rec' continue ATB. He received blood transfusion and impressed r/t chronic disease. TPN also discontinued and patient tolerate PO diet. He discharged back to  on 3/24/23 to continue rehab and ATB.   23 patient was sent back to ER due to persistent and worsening abdominal pain, nausea and vomiting. CT of the abdomen and pelvis suggests colitis along with some fluid   fluid collection around the gastric fundus concerning for abscess. He was treated with IV Zosyn. IR drained about 5 cc of fluid.  His symptoms was improving and he was discharge back to   and continue oral Augmentin for 7 days.     Updated on 4/10/23; he appears comfortable and reported feels better. He denies chest pain,  SOB, dizziness, F/C/S/N/V, constipation or diarrhea.     Objective  Weight: 156.1 4/10/2023 05:46  Blood Pressure: 106 /  63 4/9/2023 18:10  Temperature: 97.4 4/9/2023 18:10  Pulse: 64 4/9/2023 18:10  Respirations: 18 4/9/2023 18:10  Blood Sugar: 92.0 4/3/2023 12:46  O2 sats: 98.0 % 4/9/2023 18:10  Physical exam  Constitutional: awake, afebrile, not in acute distress  Eyes:  clear sclera  ENMT: mucous membranes dry  Respiratory/Thorax: CTAB, no rales/rhonchi/wheezing  Cardiovascular: RRR, no rubs/murmurs/gallops  Gastrointestinal: +BS x4, soft, nt/nd/ng/nr  Musculoskeletal: Move all extremities   Extremities: no edema, no cellulitis  Neurological: A&O x3, attention intact, speech fluent   Psychological: Appropriate mood and behavior  Skin: warm and dry, intact     Labs on 4/10/23    Basic Metabolic Panl       Glucose  79 mg/dL 74-99  Final      The American Diabetes Association (ADA) provides guidance for cutoff values for fasting glucose and random glucose. The ADA defines fasting as no caloric intake for at least 8 hours. Fasting plasma glucose results between 100 to 125 mg/dL indicate increased risk for diabetes (prediabetes).  Fasting plasma glucose results greater than or equal to 126 mg/dL meet the criteria for diagnosis of diabetes. In the absence of unequivocal hyperglycemia, results should be confirmed by repeat testing. In a patient with classic symptoms of hyperglycemia or hyperglycemic crisis, random plasma glucose results greater than or equal to 200 mg/dL meet the criteria for diagnosis of diabetes.  Reference: Standards of Medical Care in Diabetes 2016, American Diabetes Association. Diabetes Care. 2016.39(Suppl 1).       BUN  13 mg/dL 9-24  Final           Creatinine  0.88 mg/dL 0.73-1.22  Final           Sodium  133 mmol/L 136-144 L Final           Potassium  4.3 mmol/L 3.7-5.1  Final           Chloride  97 mmol/L   Final           CO2  24 mmol/L 22-30  Final           Anion  Gap  12 mmol/L 9-18  Final           Calcium, Total  9.0 mg/dL 8.5-10.2  Final           Estimated Glomerular Filtration Rate  85 mL/min/1.73 meters squared >=60  Final      Estimated Glomerular Filtration Rate (eGFR) is calculated using the 2021 CKD-EPI creatinine equation. This equation utilizes serum creatinine, sex, and age as parameters. The creatinine assay has traceable calibration to isotope dilution-mass spectrometry. Refer to KDIGO guidelines for clinical interpretation. In patients with unstable renal function, e.g. those with acute kidney injury, the eGFR may not accurately reflect actual GFR.   MAGNESIUM  1.9 mg/dL 1.7-2.3  Final   Assessment/Plan  #Intractable vomiting and nausea  #Intra abdominal abscess   #Abdominal pain; resolved   - s/p drain removed   - per chart reviewed; CT A/P showed decreased size of abscces and drain was removed    - s/p IR drainage (small and no drain left in place), cx NGTD  - c/w [id number] BID for 7D.  - c/w Zofran as needed   - Lab reviewed and no leukocytosis  - Afebrile and CRP trending down   - continue monitor   #GERD  - C/W Pantoprazole 40 MG po DAILY  - C/W Zofran as needed for N/V  #severe Malnutrition  - continue regular diet  - dietitian to follow up  #HLD  - c/w Atorvastatin 10 mg PO daily  #Depression  - c/w Citalopram 10 mg PO daily   #Med reviewed  #Lab reviewed    Time spending 35 minutes   -Reviewed orders, medications, records, and pertinent labs/x-rays from [facility]. Reviewed VS, BS, O2, etc as per protocol. Reviewed and signed off orders, medications, Labs, x-rays, and current diagnoses in [facility]  -Obtained history  -Performing physical examination  -Counseling and educating patient for above POC   -Ordering medications, lab   -Documenting clinical information in the [facility]   -Care coordinating with nursing staff       Electronically Signed By: JOSÉ ANTONIO Chapman-CNP   4/11/23 11:23 PM

## 2023-04-10 NOTE — PROGRESS NOTES
Late entry;     Subjective   Patient ID: Dakota Pugh is a 83 y.o. male who is acute skilled care and seen after hospitalization for abdominal pain and intractable vomiting     HPI   A 82-year-old man with history of recent gastric perforation status post repair, history of diverticulitis, BPH, HLD, presented from acute rehab with increasing abdominal pain, N/V, CT A/P positive for abdominal fluid collections compatible with abscesses, gastrocutaneous fistula. Patient admitted, started on IV Zosyn, IR guided drains placed-culture showed mixed anaerobes/aerobes, beta-lactamase producers. PICC line placed and started on TPN to allow for bowel rest. Repeat CT with stable/decreased fluid collections, drains in proper position. Patient d/c to  for rehab and continue TPN. Patient was admitted to  during 3/2/23-3/14/23. During rehab stay patient found to have anemia with significantly dropped to 6.6 and required blood transfusion due to patient symptomatic fatigue and weakness. Infectious disease rec' for further work-up.  CT abdomen negative for intraabdominal hemorrhage but showed decreased in size of intraabdominal abscess.  Patient's drain was removed but rec' continue ATB. He received blood transfusion and impressed r/t chronic disease. TPN also discontinued and patient tolerate PO diet. He discharged back to  on 3/24/23 to continue rehab and ATB.   4/7/23 patient was sent back to ER due to persistent and worsening abdominal pain, nausea and vomiting. CT of the abdomen and pelvis suggests colitis along with some fluid   fluid collection around the gastric fundus concerning for abscess. He was treated with IV Zosyn. IR drained about 5 cc of fluid.  His symptoms was improving and he was discharge back to   and continue oral Augmentin for 7 days.     Updated on 4/10/23; he appears comfortable and reported feels better. He denies chest pain, SOB, dizziness, F/C/S/N/V, constipation or diarrhea.     Objective    Weight: 156.1 4/10/2023 05:46  Blood Pressure: 106 /  63 4/9/2023 18:10  Temperature: 97.4 4/9/2023 18:10  Pulse: 64 4/9/2023 18:10  Respirations: 18 4/9/2023 18:10  Blood Sugar: 92.0 4/3/2023 12:46  O2 sats: 98.0 % 4/9/2023 18:10  Physical exam  Constitutional: awake, afebrile, not in acute distress  Eyes:  clear sclera  ENMT: mucous membranes dry  Respiratory/Thorax: CTAB, no rales/rhonchi/wheezing  Cardiovascular: RRR, no rubs/murmurs/gallops  Gastrointestinal: +BS x4, soft, nt/nd/ng/nr  Musculoskeletal: Move all extremities   Extremities: no edema, no cellulitis  Neurological: A&O x3, attention intact, speech fluent   Psychological: Appropriate mood and behavior  Skin: warm and dry, intact     Labs on 4/10/23    Basic Metabolic Panl       Glucose  79 mg/dL 74-99  Final      The American Diabetes Association (ADA) provides guidance for cutoff values for fasting glucose and random glucose. The ADA defines fasting as no caloric intake for at least 8 hours. Fasting plasma glucose results between 100 to 125 mg/dL indicate increased risk for diabetes (prediabetes).  Fasting plasma glucose results greater than or equal to 126 mg/dL meet the criteria for diagnosis of diabetes. In the absence of unequivocal hyperglycemia, results should be confirmed by repeat testing. In a patient with classic symptoms of hyperglycemia or hyperglycemic crisis, random plasma glucose results greater than or equal to 200 mg/dL meet the criteria for diagnosis of diabetes.  Reference: Standards of Medical Care in Diabetes 2016, American Diabetes Association. Diabetes Care. 2016.39(Suppl 1).       BUN  13 mg/dL 9-24  Final           Creatinine  0.88 mg/dL 0.73-1.22  Final           Sodium  133 mmol/L 136-144 L Final           Potassium  4.3 mmol/L 3.7-5.1  Final           Chloride  97 mmol/L   Final           CO2  24 mmol/L 22-30  Final           Anion Gap  12 mmol/L 9-18  Final           Calcium, Total  9.0 mg/dL 8.5-10.2  Final            Estimated Glomerular Filtration Rate  85 mL/min/1.73 meters squared >=60  Final      Estimated Glomerular Filtration Rate (eGFR) is calculated using the 2021 CKD-EPI creatinine equation. This equation utilizes serum creatinine, sex, and age as parameters. The creatinine assay has traceable calibration to isotope dilution-mass spectrometry. Refer to KDIGO guidelines for clinical interpretation. In patients with unstable renal function, e.g. those with acute kidney injury, the eGFR may not accurately reflect actual GFR.   MAGNESIUM  1.9 mg/dL 1.7-2.3  Final   Assessment/Plan   #Intractable vomiting and nausea  #Intra abdominal abscess   #Abdominal pain; resolved   - s/p drain removed   - per chart reviewed; CT A/P showed decreased size of abscces and drain was removed    - s/p IR drainage (small and no drain left in place), cx NGTD  - c/w [id number] BID for 7D.  - c/w Zofran as needed   - Lab reviewed and no leukocytosis  - Afebrile and CRP trending down   - continue monitor   #GERD  - C/W Pantoprazole 40 MG po DAILY  - C/W Zofran as needed for N/V  #severe Malnutrition  - continue regular diet  - dietitian to follow up  #HLD  - c/w Atorvastatin 10 mg PO daily  #Depression  - c/w Citalopram 10 mg PO daily   #Med reviewed  #Lab reviewed    Time spending 35 minutes   -Reviewed orders, medications, records, and pertinent labs/x-rays from [facility]. Reviewed VS, BS, O2, etc as per protocol. Reviewed and signed off orders, medications, Labs, x-rays, and current diagnoses in [facility]  -Obtained history  -Performing physical examination  -Counseling and educating patient for above POC   -Ordering medications, lab   -Documenting clinical information in the [facility]   -Care coordinating with nursing staff

## 2023-04-20 ENCOUNTER — NURSING HOME VISIT (OUTPATIENT)
Dept: POST ACUTE CARE | Facility: EXTERNAL LOCATION | Age: 83
End: 2023-04-20
Payer: MEDICARE

## 2023-04-20 DIAGNOSIS — Z79.899 ENCOUNTER FOR MEDICATION REVIEW: ICD-10-CM

## 2023-04-20 DIAGNOSIS — R10.9 ABDOMINAL PAIN, UNSPECIFIED ABDOMINAL LOCATION: ICD-10-CM

## 2023-04-20 DIAGNOSIS — E43 SEVERE PROTEIN-CALORIE MALNUTRITION (MULTI): ICD-10-CM

## 2023-04-20 DIAGNOSIS — I82.601 THROMBOSIS OF ARM, RIGHT: ICD-10-CM

## 2023-04-20 DIAGNOSIS — K65.1 INTRA-ABDOMINAL ABSCESS (MULTI): Primary | ICD-10-CM

## 2023-04-20 DIAGNOSIS — Z01.89 LABORATORY EXAMINATION: ICD-10-CM

## 2023-04-20 DIAGNOSIS — K21.9 GASTROESOPHAGEAL REFLUX DISEASE WITHOUT ESOPHAGITIS: ICD-10-CM

## 2023-04-20 PROCEDURE — 99309 SBSQ NF CARE MODERATE MDM 30: CPT | Performed by: NURSE PRACTITIONER

## 2023-04-20 NOTE — LETTER
Patient: Dakota Pugh  : 1940    Encounter Date: 2023    Subjective   Patient ID: Dakota Pugh is a 83 y.o. male who is acute skilled care and follow up for multiple medical problems  HPI   A 82-year-old man with history of recent gastric perforation status post repair, history of diverticulitis, BPH, HLD, presented from acute rehab with increasing abdominal pain, N/V, CT A/P positive for abdominal fluid collections compatible with abscesses, gastrocutaneous fistula. Patient admitted, started on IV Zosyn, IR guided drains placed-culture showed mixed anaerobes/aerobes, beta-lactamase producers. PICC line placed and started on TPN to allow for bowel rest. Repeat CT with stable/decreased fluid collections, drains in proper position. Patient d/c to  for rehab and continue TPN. Patient was admitted to  during 3/2/23-3/14/23. During rehab stay patient found to have anemia with significantly dropped to 6.6 and required blood transfusion due to patient symptomatic fatigue and weakness. Infectious disease rec' for further work-up.  CT abdomen negative for intraabdominal hemorrhage but showed decreased in size of intraabdominal abscess.  Patient's drain was removed but rec' continue ATB. He received blood transfusion and impressed r/t chronic disease. TPN also discontinued and patient tolerate PO diet. He discharged back to  on 3/24/23 to continue rehab and ATB.   23 patient was sent back to ER due to persistent and worsening abdominal pain, nausea and vomiting. CT of the abdomen and pelvis suggests colitis along with some fluid   fluid collection around the gastric fundus concerning for abscess. He was treated with IV Zosyn. IR drained about 5 cc of fluid.  His symptoms was improving and he was discharge back to   and continue oral Augmentin for 7 days.     Updated on 23; he reported doing better with therapy and would like to go home. Tolerate PO diet well with no abdominal pain or N/V. Had  regular BM. He denies pain. We had long discussion about fentanyl pain patch and plan to discontinue prior discharge home. He agreed with plan. We will also repeat ultrasound on his right upper arm to determine for his OAC. He denies F/C/S/N/V    Objective   4/20/2023 13:31 102 / 49 mmHg Lying l/arm  4/20/2023 13:31 97.6 °F Forehead (non-contact)  4/20/2023 13:31 65 bpm Not Applicable  4/20/2023 13:31 16 Breaths/min  4/20/2023 13:31 94.0 % Room Air  Physical exam  Constitutional: awake, afebrile, not in acute distress  Eyes:  clear sclera  ENMT: mucous membranes dry  Respiratory/Thorax: CTAB, no rales/rhonchi/wheezing  Cardiovascular: RRR, no rubs/murmurs/gallops  Gastrointestinal: +BS x4, soft, nt/nd/ng/nr  Musculoskeletal: Move all extremities   Extremities: no edema, no cellulitis  Neurological: A&O x3, attention intact, speech fluent   Psychological: Appropriate mood and behavior  Skin: warm and dry, intact     Labs on 4/14/23   CBC       White Blood Cell Count  6.19 k/uL 3.70-11.00  Final           RBC  3.38 m/uL 4.20-6.00 L Final           Hemoglobin  9.6 g/dL 13.0-17.0 L Final           Hematocrit  29.2 % 39.0-51.0 L Final           MCV  86.4 fL 80.0-100.0  Final           MCH  28.4 pg 26.0-34.0  Final           MCHC  32.9 g/dL 30.5-36.0  Final           RDW-CV  17.8 % 11.5-15.0 H Final           Platelet Count  399 k/uL 150-400  Final           MPV  9.1 fL 9.0-12.7  Final           Absolute nRBC  <0.01 k/uL <0.01  Final       Basic Metabolic Panl       Glucose  86 mg/dL 74-99  Final      The American Diabetes Association (ADA) provides guidance for cutoff values for fasting glucose and random glucose. The ADA defines fasting as no caloric intake for at least 8 hours. Fasting plasma glucose results between 100 to 125 mg/dL indicate increased risk for diabetes (prediabetes).  Fasting plasma glucose results greater than or equal to 126 mg/dL meet the criteria for diagnosis of diabetes. In the absence of  unequivocal hyperglycemia, results should be confirmed by repeat testing. In a patient with classic symptoms of hyperglycemia or hyperglycemic crisis, random plasma glucose results greater than or equal to 200 mg/dL meet the criteria for diagnosis of diabetes.  Reference: Standards of Medical Care in Diabetes 2016, American Diabetes Association. Diabetes Care. 2016.39(Suppl 1).       BUN  14 mg/dL 9-24  Final           Creatinine  1.08 mg/dL 0.73-1.22  Final           Sodium  132 mmol/L 136-144 L Final           Potassium  4.7 mmol/L 3.7-5.1  Final           Chloride  98 mmol/L   Final           CO2  24 mmol/L 22-30  Final           Anion Gap  10 mmol/L 9-18  Final           Calcium, Total  9.4 mg/dL 8.5-10.2  Final           Estimated Glomerular Filtration Rate  68 mL/min/1.73 meters squared >=60  Final      Estimated Glomerular Filtration Rate (eGFR) is calculated using the 2021 CKD-EPI creatinine equation. This equation utilizes serum creatinine, sex, and age as parameters. The creatinine assay has traceable calibration to isotope dilution-mass spectrometry. Refer to KDIGO guidelines for clinical interpretation. In patients with unstable renal function, e.g. those with acute kidney injury, the eGFR may not accurately reflect actual GFR.   MAGNESIUM  2.2 mg/dL 1.7-2.3  Final      Assessment/Plan   #Intra abdominal abscess; denies abdominal pain, N/V. Tolerate PO diet well  #Abdominal pain; resolved   - s/p drain removed   - per chart reviewed; CT A/P showed decreased size of abscces and drain was removed    - s/p IR drainage (small and no drain left in place), cx NGTD  - completed  [id number] BID for 7D.  - discontinue Fentanyl  - c/w Zofran as needed   - Lab reviewed and no leukocytosis  - Afebrile and CRP trending down   - continue monitor   #Thrombus on the right upper arm  - Repeat test ultrasound to r/o new clot  - Consider to discontinue Lovenox if the ultrasound test negative   #GERD  - C/W  Pantoprazole 40 MG po DAILY  - C/W Zofran as needed for N/V  #severe Malnutrition; tolerate PO diet better   - continue regular diet  - dietitian to follow up  #Med reviewed  #Lab reviewed      Electronically Signed By: JOSÉ ANTONIO Chapman-CNP   4/23/23  6:09 PM

## 2023-04-21 ENCOUNTER — NURSING HOME VISIT (OUTPATIENT)
Dept: POST ACUTE CARE | Facility: EXTERNAL LOCATION | Age: 83
End: 2023-04-21
Payer: MEDICARE

## 2023-04-21 DIAGNOSIS — Z75.8 DISCHARGE PLANNING ISSUES: ICD-10-CM

## 2023-04-21 DIAGNOSIS — F32.A DEPRESSION, UNSPECIFIED DEPRESSION TYPE: ICD-10-CM

## 2023-04-21 DIAGNOSIS — R10.9 ABDOMINAL PAIN, UNSPECIFIED ABDOMINAL LOCATION: ICD-10-CM

## 2023-04-21 DIAGNOSIS — I82.601 THROMBOSIS OF ARM, RIGHT: Primary | ICD-10-CM

## 2023-04-21 DIAGNOSIS — E87.1 HYPONATREMIA: ICD-10-CM

## 2023-04-21 DIAGNOSIS — K21.9 GASTROESOPHAGEAL REFLUX DISEASE, UNSPECIFIED WHETHER ESOPHAGITIS PRESENT: ICD-10-CM

## 2023-04-21 PROCEDURE — 99309 SBSQ NF CARE MODERATE MDM 30: CPT | Performed by: NURSE PRACTITIONER

## 2023-04-21 NOTE — LETTER
Patient: Dakota Pugh  : 1940    Encounter Date: 2023    Subjective  Patient ID: Dakota Pugh is a 83 y.o. male who is acute skilled care and follow up for multiple medical problems  HPI   A 82-year-old man with history of recent gastric perforation status post repair, history of diverticulitis, BPH, HLD, presented from acute rehab with increasing abdominal pain, N/V, CT A/P positive for abdominal fluid collections compatible with abscesses, gastrocutaneous fistula. Patient admitted, started on IV Zosyn, IR guided drains placed-culture showed mixed anaerobes/aerobes, beta-lactamase producers. PICC line placed and started on TPN to allow for bowel rest. Repeat CT with stable/decreased fluid collections, drains in proper position. Patient d/c to  for rehab and continue TPN. Patient was admitted to  during 3/2/23-3/14/23. During rehab stay patient found to have anemia with significantly dropped to 6.6 and required blood transfusion due to patient symptomatic fatigue and weakness. Infectious disease rec' for further work-up.  CT abdomen negative for intraabdominal hemorrhage but showed decreased in size of intraabdominal abscess.  Patient's drain was removed but rec' continue ATB. He received blood transfusion and impressed r/t chronic disease. TPN also discontinued and patient tolerate PO diet. He discharged back to  on 3/24/23 to continue rehab and ATB.   23 patient was sent back to ER due to persistent and worsening abdominal pain, nausea and vomiting. CT of the abdomen and pelvis suggests colitis along with some fluid   fluid collection around the gastric fundus concerning for abscess. He was treated with IV Zosyn. IR drained about 5 cc of fluid.  His symptoms was improving and he was discharge back to   and continue oral Augmentin for 7 days.     Updated on 23; he reported pain appropriately controlled. Lab identified hyponatremia (chronic). Ultrasound result reviewed and showed  negative for DVT on right arm. Will discontinue Lovenox. Tolerate PO diet well with no abdominal pain or N/V. Had regular BM. He denies pain. He denies F/C/S/N/V    Objective  4/21/2023 13:21 120 / 70 mmHg  4/21/2023 21:25 99.2 °F Oral  4/21/2023 21:25 76 bpm Regular  4/21/2023 21:25 18 Breaths/min  4/21/2023 21:25 93.0 % Room Air  Physical exam  Constitutional: awake, afebrile, not in acute distress  Eyes:  clear sclera  ENMT: mucous membranes dry  Respiratory/Thorax: CTAB, no rales/rhonchi/wheezing  Cardiovascular: RRR, no rubs/murmurs/gallops  Gastrointestinal: +BS x4, soft, nt/nd/ng/nr  Musculoskeletal: Move all extremities   Extremities: no edema, no cellulitis  Neurological: A&O x3, attention intact, speech fluent   Psychological: Appropriate mood and behavior  Skin: warm and dry, intact     Labs on 4/21/23       Show All Details Result Units Ref. Range Flag Status   CBC       White Blood Cell Count  6.67 k/uL 3.70-11.00  Final           RBC  3.31 m/uL 4.20-6.00 L Final           Hemoglobin  9.6 g/dL 13.0-17.0 L Final           Hematocrit  29.8 % 39.0-51.0 L Final           MCV  90.0 fL 80.0-100.0  Final           MCH  29.0 pg 26.0-34.0  Final           MCHC  32.2 g/dL 30.5-36.0  Final           RDW-CV  18.1 % 11.5-15.0 H Final           Platelet Count  297 k/uL 150-400  Final           MPV  8.9 fL 9.0-12.7 L Final           Absolute nRBC  <0.01 k/uL <0.01  Final       Basic Metabolic Panl       Glucose  99 mg/dL 74-99  Final      The American Diabetes Association (ADA) provides guidance for cutoff values for fasting glucose and random glucose. The ADA defines fasting as no caloric intake for at least 8 hours. Fasting plasma glucose results between 100 to 125 mg/dL indicate increased risk for diabetes (prediabetes).  Fasting plasma glucose results greater than or equal to 126 mg/dL meet the criteria for diagnosis of diabetes. In the absence of unequivocal hyperglycemia, results should be confirmed by repeat  testing. In a patient with classic symptoms of hyperglycemia or hyperglycemic crisis, random plasma glucose results greater than or equal to 200 mg/dL meet the criteria for diagnosis of diabetes.  Reference: Standards of Medical Care in Diabetes 2016, American Diabetes Association. Diabetes Care. 2016.39(Suppl 1).       BUN  20 mg/dL 9-24  Final           Creatinine  0.89 mg/dL 0.73-1.22  Final           Sodium  133 mmol/L 136-144 L Final           Potassium  3.9 mmol/L 3.7-5.1  Final           Chloride  98 mmol/L   Final           CO2  22 mmol/L 22-30  Final           Anion Gap  13 mmol/L 9-18  Final           Calcium, Total  9.2 mg/dL 8.5-10.2  Final           Estimated Glomerular Filtration Rate  85 mL/min/1.73 meters squared >=60  Final      Estimated Glomerular Filtration Rate (eGFR) is calculated using the 2021 CKD-EPI creatinine equation. This equation utilizes serum creatinine, sex, and age as parameters. The creatinine assay has traceable calibration to isotope dilution-mass spectrometry. Refer to KDIGO guidelines for clinical interpretation. In patients with unstable renal function, e.g. those with acute kidney injury, the eGFR may not accurately reflect actual GFR.   MAGNESIUM  1.9 mg/dL 1.7-2.3  Final       Assessment/Plan  #Thrombus on the right upper arm  - Repeat test ultrasound to r/o new clot  - Discontinued Lovenox given ultrasound test negative   #Abdominal pain; no c/o pain since fentanyl removed   - s/p drain removed   - per chart reviewed; CT A/P showed decreased size of abscces and drain was removed    - s/p IR drainage (small and no drain left in place), cx NGTD  - completed  [id number] BID for 7D.  - discontinue Fentanyl  - c/w Zofran as needed   - Lab reviewed and no leukocytosis  #Hyponatremia  - Sodium stable level 132-134  - asymptomatic. May r/t malnutrition   - Will check LFT and INR/PT to r/o liver disease   #Depression  - continue with Citalopram   #GERD  - Continue  Protonix   #discharge planing issues   - Reviewed medications and educated patient for reason of taking medication. Prescriptions were made as patient's wife requested.  -  Explained the need for follow-up with specialist and also follow-up with community providers/PCP.  - Discussed with SW; patient requires a home visiting RN for monitoring blood sugar, finally patient would benefit from home PT/OT to improve muscle strengthening & to establish a home exercise program due to deconditioning  #Med reviewed  #Lab reviewed      Electronically Signed By: TEAGAN Chapman   4/23/23  6:34 PM

## 2023-04-23 RX ORDER — PANTOPRAZOLE SODIUM 40 MG/1
40 TABLET, DELAYED RELEASE ORAL
Qty: 30 TABLET | Refills: 0 | Status: SHIPPED | OUTPATIENT
Start: 2023-04-23 | End: 2023-05-18 | Stop reason: SDUPTHER

## 2023-04-23 RX ORDER — CITALOPRAM 10 MG/1
10 TABLET ORAL DAILY
Qty: 30 TABLET | Refills: 0 | Status: SHIPPED | OUTPATIENT
Start: 2023-04-23 | End: 2023-05-18 | Stop reason: SDUPTHER

## 2023-04-23 NOTE — PROGRESS NOTES
Subjective   Patient ID: Dakota Pugh is a 83 y.o. male who is acute skilled care and follow up for multiple medical problems  HPI   A 82-year-old man with history of recent gastric perforation status post repair, history of diverticulitis, BPH, HLD, presented from acute rehab with increasing abdominal pain, N/V, CT A/P positive for abdominal fluid collections compatible with abscesses, gastrocutaneous fistula. Patient admitted, started on IV Zosyn, IR guided drains placed-culture showed mixed anaerobes/aerobes, beta-lactamase producers. PICC line placed and started on TPN to allow for bowel rest. Repeat CT with stable/decreased fluid collections, drains in proper position. Patient d/c to  for rehab and continue TPN. Patient was admitted to  during 3/2/23-3/14/23. During rehab stay patient found to have anemia with significantly dropped to 6.6 and required blood transfusion due to patient symptomatic fatigue and weakness. Infectious disease rec' for further work-up.  CT abdomen negative for intraabdominal hemorrhage but showed decreased in size of intraabdominal abscess.  Patient's drain was removed but rec' continue ATB. He received blood transfusion and impressed r/t chronic disease. TPN also discontinued and patient tolerate PO diet. He discharged back to  on 3/24/23 to continue rehab and ATB.   4/7/23 patient was sent back to ER due to persistent and worsening abdominal pain, nausea and vomiting. CT of the abdomen and pelvis suggests colitis along with some fluid   fluid collection around the gastric fundus concerning for abscess. He was treated with IV Zosyn. IR drained about 5 cc of fluid.  His symptoms was improving and he was discharge back to   and continue oral Augmentin for 7 days.     Updated on 4/21/23; he reported pain appropriately controlled. Lab identified hyponatremia (chronic). Ultrasound result reviewed and showed negative for DVT on right arm. Will discontinue Lovenox. Tolerate PO  diet well with no abdominal pain or N/V. Had regular BM. He denies pain. He denies F/C/S/N/V    Objective   4/21/2023 13:21 120 / 70 mmHg  4/21/2023 21:25 99.2 °F Oral  4/21/2023 21:25 76 bpm Regular  4/21/2023 21:25 18 Breaths/min  4/21/2023 21:25 93.0 % Room Air  Physical exam  Constitutional: awake, afebrile, not in acute distress  Eyes:  clear sclera  ENMT: mucous membranes dry  Respiratory/Thorax: CTAB, no rales/rhonchi/wheezing  Cardiovascular: RRR, no rubs/murmurs/gallops  Gastrointestinal: +BS x4, soft, nt/nd/ng/nr  Musculoskeletal: Move all extremities   Extremities: no edema, no cellulitis  Neurological: A&O x3, attention intact, speech fluent   Psychological: Appropriate mood and behavior  Skin: warm and dry, intact     Labs on 4/21/23       Show All Details Result Units Ref. Range Flag Status   CBC       White Blood Cell Count  6.67 k/uL 3.70-11.00  Final           RBC  3.31 m/uL 4.20-6.00 L Final           Hemoglobin  9.6 g/dL 13.0-17.0 L Final           Hematocrit  29.8 % 39.0-51.0 L Final           MCV  90.0 fL 80.0-100.0  Final           MCH  29.0 pg 26.0-34.0  Final           MCHC  32.2 g/dL 30.5-36.0  Final           RDW-CV  18.1 % 11.5-15.0 H Final           Platelet Count  297 k/uL 150-400  Final           MPV  8.9 fL 9.0-12.7 L Final           Absolute nRBC  <0.01 k/uL <0.01  Final       Basic Metabolic Panl       Glucose  99 mg/dL 74-99  Final      The American Diabetes Association (ADA) provides guidance for cutoff values for fasting glucose and random glucose. The ADA defines fasting as no caloric intake for at least 8 hours. Fasting plasma glucose results between 100 to 125 mg/dL indicate increased risk for diabetes (prediabetes).  Fasting plasma glucose results greater than or equal to 126 mg/dL meet the criteria for diagnosis of diabetes. In the absence of unequivocal hyperglycemia, results should be confirmed by repeat testing. In a patient with classic symptoms of hyperglycemia or  hyperglycemic crisis, random plasma glucose results greater than or equal to 200 mg/dL meet the criteria for diagnosis of diabetes.  Reference: Standards of Medical Care in Diabetes 2016, American Diabetes Association. Diabetes Care. 2016.39(Suppl 1).       BUN  20 mg/dL 9-24  Final           Creatinine  0.89 mg/dL 0.73-1.22  Final           Sodium  133 mmol/L 136-144 L Final           Potassium  3.9 mmol/L 3.7-5.1  Final           Chloride  98 mmol/L   Final           CO2  22 mmol/L 22-30  Final           Anion Gap  13 mmol/L 9-18  Final           Calcium, Total  9.2 mg/dL 8.5-10.2  Final           Estimated Glomerular Filtration Rate  85 mL/min/1.73 meters squared >=60  Final      Estimated Glomerular Filtration Rate (eGFR) is calculated using the 2021 CKD-EPI creatinine equation. This equation utilizes serum creatinine, sex, and age as parameters. The creatinine assay has traceable calibration to isotope dilution-mass spectrometry. Refer to KDIGO guidelines for clinical interpretation. In patients with unstable renal function, e.g. those with acute kidney injury, the eGFR may not accurately reflect actual GFR.   MAGNESIUM  1.9 mg/dL 1.7-2.3  Final       Assessment/Plan   #Thrombus on the right upper arm  - Repeat test ultrasound to r/o new clot  - Discontinued Lovenox given ultrasound test negative   #Abdominal pain; no c/o pain since fentanyl removed   - s/p drain removed   - per chart reviewed; CT A/P showed decreased size of abscces and drain was removed    - s/p IR drainage (small and no drain left in place), cx NGTD  - completed  [id number] BID for 7D.  - discontinue Fentanyl  - c/w Zofran as needed   - Lab reviewed and no leukocytosis  #Hyponatremia  - Sodium stable level 132-134  - asymptomatic. May r/t malnutrition   - Will check LFT and INR/PT to r/o liver disease   #Depression  - continue with Citalopram   #GERD  - Continue Protonix   #discharge planing issues   - Reviewed medications and  educated patient for reason of taking medication. Prescriptions were made as patient's wife requested.  -  Explained the need for follow-up with specialist and also follow-up with community providers/PCP.  - Discussed with SW; patient requires a home visiting RN for monitoring blood sugar, finally patient would benefit from home PT/OT to improve muscle strengthening & to establish a home exercise program due to deconditioning  #Med reviewed  #Lab reviewed

## 2023-04-24 ENCOUNTER — NURSING HOME VISIT (OUTPATIENT)
Dept: POST ACUTE CARE | Facility: EXTERNAL LOCATION | Age: 83
End: 2023-04-24
Payer: MEDICARE

## 2023-04-24 DIAGNOSIS — R11.2 NAUSEA AND VOMITING, UNSPECIFIED VOMITING TYPE: Primary | ICD-10-CM

## 2023-04-24 DIAGNOSIS — Z91.81 STATUS POST FALL: ICD-10-CM

## 2023-04-24 DIAGNOSIS — Z75.8 DISCHARGE PLANNING ISSUES: ICD-10-CM

## 2023-04-24 DIAGNOSIS — E87.1 HYPONATREMIA: ICD-10-CM

## 2023-04-24 DIAGNOSIS — E78.5 HYPERLIPIDEMIA, UNSPECIFIED HYPERLIPIDEMIA TYPE: ICD-10-CM

## 2023-04-24 DIAGNOSIS — K21.9 GASTROESOPHAGEAL REFLUX DISEASE, UNSPECIFIED WHETHER ESOPHAGITIS PRESENT: ICD-10-CM

## 2023-04-24 DIAGNOSIS — R10.9 ABDOMINAL PAIN, UNSPECIFIED ABDOMINAL LOCATION: ICD-10-CM

## 2023-04-24 DIAGNOSIS — E46 PROTEIN-CALORIE MALNUTRITION, UNSPECIFIED SEVERITY (MULTI): ICD-10-CM

## 2023-04-24 DIAGNOSIS — F32.A DEPRESSION, UNSPECIFIED DEPRESSION TYPE: ICD-10-CM

## 2023-04-24 PROCEDURE — 99316 NF DSCHRG MGMT 30 MIN+: CPT | Performed by: NURSE PRACTITIONER

## 2023-04-24 RX ORDER — ONDANSETRON 4 MG/1
4 TABLET, FILM COATED ORAL EVERY 8 HOURS PRN
Qty: 30 TABLET | Refills: 0 | Status: SHIPPED | OUTPATIENT
Start: 2023-04-24 | End: 2023-05-04

## 2023-04-24 RX ORDER — SODIUM CHLORIDE 1000 MG
1 TABLET, SOLUBLE MISCELLANEOUS 3 TIMES DAILY
Qty: 7 TABLET | Refills: 0 | Status: SHIPPED | OUTPATIENT
Start: 2023-04-24 | End: 2023-05-01

## 2023-04-24 NOTE — Clinical Note
During rehab course patient found to have low sodium level 131-133. His LFT unremarkable. BNP elevated 559 on 4/25/23. Furosemide was started 20 mg PO daily twice a week. Patient is clinically stable to discharge home. Please follow up his sodium level and adjust diuretic as needed.

## 2023-04-24 NOTE — LETTER
Patient: Dakota Pugh  : 1940    Encounter Date: 2023    Subjective   Patient ID: Dakota Pugh is a 83 y.o. male who is acute skilled care and follow up for multiple medical problems; hyponatremia, post op pain and discharge planing issues  HPI   A 82-year-old man with history of recent gastric perforation status post repair, history of diverticulitis, BPH, HLD, presented from acute rehab with increasing abdominal pain, N/V, CT A/P positive for abdominal fluid collections compatible with abscesses, gastrocutaneous fistula. Patient admitted, started on IV Zosyn, IR guided drains placed-culture showed mixed anaerobes/aerobes, beta-lactamase producers. PICC line placed and started on TPN to allow for bowel rest. Repeat CT with stable/decreased fluid collections, drains in proper position. Patient d/c to  for rehab and continue TPN. Patient was admitted to  during 3/2/23-3/14/23. During rehab stay patient found to have anemia with significantly dropped to 6.6 and required blood transfusion due to patient symptomatic fatigue and weakness. Infectious disease rec' for further work-up.  CT abdomen negative for intraabdominal hemorrhage but showed decreased in size of intraabdominal abscess.  Patient's drain was removed but rec' continue ATB. He received blood transfusion and impressed r/t chronic disease. TPN also discontinued and patient tolerate PO diet. He discharged back to  on 3/24/23 to continue rehab and ATB.   23 patient was sent back to ER due to persistent and worsening abdominal pain, nausea and vomiting. CT of the abdomen and pelvis suggests colitis along with some fluid   fluid collection around the gastric fundus concerning for abscess. He was treated with IV Zosyn. IR drained about 5 cc of fluid.  His symptoms was improving and he was discharge back to   and continue oral Augmentin for 7 days.     Updated on 23; he reported pain appropriately controlled. Lab identified  hyponatremia (chronic) 129, asymptomatic. LFT and renal function lab unremarkable. Had fall during the weekend but denies any new injury and denies pain. Overall pain controlled with Tylenol as needed.  He denies chest pain, SOB, dizziness and F/C/S/N/V, constipation.    Objective   Weight: 166.0 4/25/2023 07:18  Blood Pressure: 102 /  74 4/24/2023 22:10  Temperature: 98.2 4/24/2023 22:10  Pulse: 61 4/24/2023 22:10  Respirations: 18 4/24/2023 22:10  Blood Sugar: 92.0 4/3/2023 12:46  O2 sats: 95.0 % 4/24/2023 22:10  Height: 70.0 3/2/2023 17:07  Pain Level: 0 4/25/2023 07:55  Physical exam  Constitutional: awake, afebrile, not in acute distress  Eyes:  clear sclera  ENMT: mucous membranes dry  Respiratory/Thorax: CTAB, no rales/rhonchi/wheezing  Cardiovascular: RRR, no rubs/murmurs/gallops  Gastrointestinal: +BS x4, soft, nt/nd/ng/nr  Musculoskeletal: Move all extremities   Extremities: no edema, no cellulitis  Neurological: A&O x3, attention intact, speech fluent   Psychological: Appropriate mood and behavior  Skin: warm and dry, intact     Labs on 4/24/23    Basic Metabolic Panl       Glucose  87 mg/dL 74-99  Final      The American Diabetes Association (ADA) provides guidance for cutoff values for fasting glucose and random glucose. The ADA defines fasting as no caloric intake for at least 8 hours. Fasting plasma glucose results between 100 to 125 mg/dL indicate increased risk for diabetes (prediabetes).  Fasting plasma glucose results greater than or equal to 126 mg/dL meet the criteria for diagnosis of diabetes. In the absence of unequivocal hyperglycemia, results should be confirmed by repeat testing. In a patient with classic symptoms of hyperglycemia or hyperglycemic crisis, random plasma glucose results greater than or equal to 200 mg/dL meet the criteria for diagnosis of diabetes.  Reference: Standards of Medical Care in Diabetes 2016, American Diabetes Association. Diabetes Care. 2016.39(Suppl 1).        BUN  20 mg/dL 9-24  Final           Creatinine  0.95 mg/dL 0.73-1.22  Final           Sodium  129 mmol/L 136-144 L Final           Potassium  4.1 mmol/L 3.7-5.1  Final           Chloride  93 mmol/L  L Final           CO2  24 mmol/L 22-30  Final           Anion Gap  12 mmol/L 9-18  Final           Calcium, Total  9.2 mg/dL 8.5-10.2  Final           Estimated Glomerular Filtration Rate  79 mL/min/1.73 meters squared >=60  Final      Estimated Glomerular Filtration Rate (eGFR) is calculated using the 2021 CKD-EPI creatinine equation. This equation utilizes serum creatinine, sex, and age as parameters. The creatinine assay has traceable calibration to isotope dilution-mass spectrometry. Refer to KDIGO guidelines for clinical interpretation. In patients with unstable renal function, e.g. those with acute kidney injury, the eGFR may not accurately reflect actual GFR.   Hepatic Functn Panel       Albumin  3.3 g/dL 3.9-4.9 L Final           Bilirubin, Total  0.5 mg/dL 0.2-1.3  Final           Bilirubin,Conjugated  <0.2 mg/dL <0.2  Final           Alkaline Phosphatase  156 U/L  H Final           AST  17 U/L 14-40  Final           ALT  11 U/L 10-54  Final           Protein, Total  6.7 g/dL 6.3-8.0  Final       Prothrombin Time       PT Sec  11.5 sec 9.7-13.0  Final           PT INR  1.1  0.9-1.3  Final      Vitamin K Antagonist (VKA) Therapeutic Range: INR 2 to 3 (Target INR of 2.5)  .  Note: For patients treated with VKA drugs, such as warfarin, the American College of Chest Physicians 2012 Guideline recommends a therapeutic INR range of 2 to 3 (target INR of 2.5). This recommendation includes high-risk patients with antiphospholipid syndrome with previous arterial or venous thromboembolism, current-generation mechanical or bioprosthetic aortic heart valve replacement.  .  Note: Patients with mechanical aortic valve replacement and additional risk factors for thromboembolic events (atrial fibrillation, previous  thromboembolism, LV dysfunction, hypercoagulable conditions) or an older generation mechanical AVR (i.e., ball in-Cage) or any mechanical MVR should have a INR therapeutic range of 2.5 to 3.5 (target INR of 3).  José GH, et al. Chest 2012, 141:7S-47S  Trisha NAIK et al. [facility] 2017, 70: 252-289  .     Assessment/Plan   #Hyponatremia; asymptomatic, denies any fluid retention  - LFT and renal function lab unremarkable  - Sodium stable level 132-134 (chronic). Today trend down 129 on 4/24/23  - May r/t malnutrition. Patient is also on SSRI   - started sodium chloride tablet 1 gm PO daily  - Will repeat BMP in am and BNP in am  - Patient can follow up with PCP as outpatient   #Abdominal pain; no c/o pain since fentanyl removed   - Hx of intra abdominal abscess and hx of recent gastric perforation status post repair, history of diverticulitis   - s/p drain removed   - per chart reviewed; CT A/P showed decreased size of abscces and drain was removed    - s/p IR drainage (small and no drain left in place), cx NGTD  - completed  [id number] BID for 7D.  - c/w Zofran as needed   - Lab reviewed and no leukocytosis and afebrile   #S/P fall; PE unremarkable  - fall precaution   - Educated patient for safety awareness   #Depression  - continue with Citalopram   #GERD  - Continue Protonix   #Malnutrition; tolerate PO diet better   - continue regular diet  #HLD  - c/w Atorvastatin 10 mg PO daily  #discharge planing issues   - Reviewed medications and educated patient for reason of taking medication. Prescriptions were made as patient's wife requested.  -  Explained the need for follow-up with specialist and also follow-up with community providers/PCP.  - Discussed with SW; patient requires a home visiting RN for monitoring blood sugar, finally patient would benefit from home PT/OT to improve muscle strengthening & to establish a home exercise program due to deconditioning  #Med reviewed  #Lab reviewed    Time spending for  discharge > 30 minutes   -Reviewed orders, medications, records, and pertinent labs/x-rays from [facility]. Reviewed VS, BS, O2, etc as per protocol. Reviewed and signed off orders, medications, Labs, x-rays, and current diagnoses in [facility]  -Obtained history  -Performing physical examination  -Counseling and educating patient's wife for above POC and ongoing discharge home instruction   -Ordering medications, lab   -Documenting clinical information in the [facility]   -Care coordinating with nursing staff       Electronically Signed By: TEAGAN Chapman   4/25/23  9:16 AM

## 2023-04-24 NOTE — PROGRESS NOTES
Subjective   Patient ID: Dakota Pugh is a 83 y.o. male who is acute skilled care and follow up for multiple medical problems; hyponatremia, post op pain and discharge planing issues  HPI   A 82-year-old man with history of recent gastric perforation status post repair, history of diverticulitis, BPH, HLD, presented from acute rehab with increasing abdominal pain, N/V, CT A/P positive for abdominal fluid collections compatible with abscesses, gastrocutaneous fistula. Patient admitted, started on IV Zosyn, IR guided drains placed-culture showed mixed anaerobes/aerobes, beta-lactamase producers. PICC line placed and started on TPN to allow for bowel rest. Repeat CT with stable/decreased fluid collections, drains in proper position. Patient d/c to  for rehab and continue TPN. Patient was admitted to  during 3/2/23-3/14/23. During rehab stay patient found to have anemia with significantly dropped to 6.6 and required blood transfusion due to patient symptomatic fatigue and weakness. Infectious disease rec' for further work-up.  CT abdomen negative for intraabdominal hemorrhage but showed decreased in size of intraabdominal abscess.  Patient's drain was removed but rec' continue ATB. He received blood transfusion and impressed r/t chronic disease. TPN also discontinued and patient tolerate PO diet. He discharged back to  on 3/24/23 to continue rehab and ATB.   4/7/23 patient was sent back to ER due to persistent and worsening abdominal pain, nausea and vomiting. CT of the abdomen and pelvis suggests colitis along with some fluid   fluid collection around the gastric fundus concerning for abscess. He was treated with IV Zosyn. IR drained about 5 cc of fluid.  His symptoms was improving and he was discharge back to   and continue oral Augmentin for 7 days.     Updated on 4/24/23; he reported pain appropriately controlled. Lab identified hyponatremia (chronic) 129, asymptomatic. LFT and renal function lab  unremarkable. Had fall during the weekend but denies any new injury and denies pain. Overall pain controlled with Tylenol as needed.  He denies chest pain, SOB, dizziness and F/C/S/N/V, constipation.    Objective   Weight: 166.0 4/25/2023 07:18  Blood Pressure: 102 /  74 4/24/2023 22:10  Temperature: 98.2 4/24/2023 22:10  Pulse: 61 4/24/2023 22:10  Respirations: 18 4/24/2023 22:10  Blood Sugar: 92.0 4/3/2023 12:46  O2 sats: 95.0 % 4/24/2023 22:10  Height: 70.0 3/2/2023 17:07  Pain Level: 0 4/25/2023 07:55  Physical exam  Constitutional: awake, afebrile, not in acute distress  Eyes:  clear sclera  ENMT: mucous membranes dry  Respiratory/Thorax: CTAB, no rales/rhonchi/wheezing  Cardiovascular: RRR, no rubs/murmurs/gallops  Gastrointestinal: +BS x4, soft, nt/nd/ng/nr  Musculoskeletal: Move all extremities   Extremities: no edema, no cellulitis  Neurological: A&O x3, attention intact, speech fluent   Psychological: Appropriate mood and behavior  Skin: warm and dry, intact     Labs on 4/24/23    Basic Metabolic Panl       Glucose  87 mg/dL 74-99  Final      The American Diabetes Association (ADA) provides guidance for cutoff values for fasting glucose and random glucose. The ADA defines fasting as no caloric intake for at least 8 hours. Fasting plasma glucose results between 100 to 125 mg/dL indicate increased risk for diabetes (prediabetes).  Fasting plasma glucose results greater than or equal to 126 mg/dL meet the criteria for diagnosis of diabetes. In the absence of unequivocal hyperglycemia, results should be confirmed by repeat testing. In a patient with classic symptoms of hyperglycemia or hyperglycemic crisis, random plasma glucose results greater than or equal to 200 mg/dL meet the criteria for diagnosis of diabetes.  Reference: Standards of Medical Care in Diabetes 2016, American Diabetes Association. Diabetes Care. 2016.39(Suppl 1).       BUN  20 mg/dL 9-24  Final            Creatinine  0.95 mg/dL 0.73-1.22  Final           Sodium  129 mmol/L 136-144 L Final           Potassium  4.1 mmol/L 3.7-5.1  Final           Chloride  93 mmol/L  L Final           CO2  24 mmol/L 22-30  Final           Anion Gap  12 mmol/L 9-18  Final           Calcium, Total  9.2 mg/dL 8.5-10.2  Final           Estimated Glomerular Filtration Rate  79 mL/min/1.73 meters squared >=60  Final      Estimated Glomerular Filtration Rate (eGFR) is calculated using the 2021 CKD-EPI creatinine equation. This equation utilizes serum creatinine, sex, and age as parameters. The creatinine assay has traceable calibration to isotope dilution-mass spectrometry. Refer to KDIGO guidelines for clinical interpretation. In patients with unstable renal function, e.g. those with acute kidney injury, the eGFR may not accurately reflect actual GFR.   Hepatic Functn Panel       Albumin  3.3 g/dL 3.9-4.9 L Final           Bilirubin, Total  0.5 mg/dL 0.2-1.3  Final           Bilirubin,Conjugated  <0.2 mg/dL <0.2  Final           Alkaline Phosphatase  156 U/L  H Final           AST  17 U/L 14-40  Final           ALT  11 U/L 10-54  Final           Protein, Total  6.7 g/dL 6.3-8.0  Final       Prothrombin Time       PT Sec  11.5 sec 9.7-13.0  Final           PT INR  1.1  0.9-1.3  Final      Vitamin K Antagonist (VKA) Therapeutic Range: INR 2 to 3 (Target INR of 2.5)  .  Note: For patients treated with VKA drugs, such as warfarin, the American College of Chest Physicians 2012 Guideline recommends a therapeutic INR range of 2 to 3 (target INR of 2.5). This recommendation includes high-risk patients with antiphospholipid syndrome with previous arterial or venous thromboembolism, current-generation mechanical or bioprosthetic aortic heart valve replacement.  .  Note: Patients with mechanical aortic valve replacement and additional risk factors for thromboembolic events (atrial fibrillation, previous thromboembolism, LV dysfunction,  hypercoagulable conditions) or an older generation mechanical AVR (i.e., ball in-Cage) or any mechanical MVR should have a INR therapeutic range of 2.5 to 3.5 (target INR of 3).  José SHARIF, et al. Chest 2012, 141:7S-47S  Trisha NAIK et al. [facility] 2017, 70: 252-289  .     Assessment/Plan   #Hyponatremia; asymptomatic, denies any fluid retention  - LFT and renal function lab unremarkable  - Sodium stable level 132-134 (chronic). Today trend down 129 on 4/24/23  - May r/t malnutrition. Patient is also on SSRI   - started sodium chloride tablet 1 gm PO daily  - Will repeat BMP in am and BNP in am  - Patient can follow up with PCP as outpatient   #Abdominal pain; no c/o pain since fentanyl removed   - Hx of intra abdominal abscess and hx of recent gastric perforation status post repair, history of diverticulitis   - s/p drain removed   - per chart reviewed; CT A/P showed decreased size of abscces and drain was removed    - s/p IR drainage (small and no drain left in place), cx NGTD  - completed  [id number] BID for 7D.  - c/w Zofran as needed   - Lab reviewed and no leukocytosis and afebrile   #S/P fall; PE unremarkable  - fall precaution   - Educated patient for safety awareness   #Depression  - continue with Citalopram   #GERD  - Continue Protonix   #Malnutrition; tolerate PO diet better   - continue regular diet  #HLD  - c/w Atorvastatin 10 mg PO daily  #discharge planing issues   - Reviewed medications and educated patient for reason of taking medication. Prescriptions were made as patient's wife requested.  -  Explained the need for follow-up with specialist and also follow-up with community providers/PCP.  - Discussed with SW; patient requires a home visiting RN for monitoring blood sugar, finally patient would benefit from home PT/OT to improve muscle strengthening & to establish a home exercise program due to deconditioning  #Med reviewed  #Lab reviewed    Time spending for discharge > 30 minutes   -Reviewed  orders, medications, records, and pertinent labs/x-rays from [facility]. Reviewed VS, BS, O2, etc as per protocol. Reviewed and signed off orders, medications, Labs, x-rays, and current diagnoses in PCC  -Obtained history  -Performing physical examination  -Counseling and educating patient's wife for above POC and ongoing discharge home instruction   -Ordering medications, lab   -Documenting clinical information in the [facility]   -Care coordinating with nursing staff     Addendum on 4/25/23.  Hyponatremia slightly improved from 129-->133. Patient found to have BNP elevated to 559 with slightly reported of SOB with physical exertion. Recommended to stop salt tablet and started Furosemide 20 mg PO twice a week. Prescription sent and message sent to his PCP to follow up and adjusted medication as needed.   LAB ON 4/25/23  PRO B Natr Peptide  559 pg/mL <450 H Final       Basic Metabolic Panl       Glucose  89 mg/dL 74-99  Final      The American Diabetes Association (ADA) provides guidance for cutoff values for fasting glucose and random glucose. The ADA defines fasting as no caloric intake for at least 8 hours. Fasting plasma glucose results between 100 to 125 mg/dL indicate increased risk for diabetes (prediabetes).  Fasting plasma glucose results greater than or equal to 126 mg/dL meet the criteria for diagnosis of diabetes. In the absence of unequivocal hyperglycemia, results should be confirmed by repeat testing. In a patient with classic symptoms of hyperglycemia or hyperglycemic crisis, random plasma glucose results greater than or equal to 200 mg/dL meet the criteria for diagnosis of diabetes.  Reference: Standards of Medical Care in Diabetes 2016, American Diabetes Association. Diabetes Care. 2016.39(Suppl 1).       BUN  24 mg/dL 9-24  Final           Creatinine  0.94 mg/dL 0.73-1.22  Final           Sodium  133 mmol/L 136-144 L Final           Potassium  4.9 mmol/L 3.7-5.1  Final            Chloride  97 mmol/L   Final           CO2  25 mmol/L 22-30  Final           Anion Gap  11 mmol/L 9-18  Final           Calcium, Total  9.3 mg/dL 8.5-10.2  Final           Estimated Glomerular Filtration Rate  80 mL/min/1.73 meters squared >=60  Final      Estimated Glomerular Filtration Rate (eGFR) is calculated using the 2021 CKD-EPI creatinine equation. This equation utilizes serum creatinine, sex, and age as parameters. The creatinine assay has traceable calibration to isotope dilution-mass spectrometry. Refer to KDIGO guidelines for clinical interpretation. In patients with unstable renal function, e.g. those with acute kidney injury, the eGFR may not accurately reflect actual GFR.

## 2023-04-25 RX ORDER — FUROSEMIDE 20 MG/1
20 TABLET ORAL
Qty: 10 TABLET | Refills: 0 | Status: SHIPPED | OUTPATIENT
Start: 2023-04-25 | End: 2023-07-31 | Stop reason: ALTCHOICE

## 2023-04-28 ENCOUNTER — TELEPHONE (OUTPATIENT)
Dept: PRIMARY CARE | Facility: CLINIC | Age: 83
End: 2023-04-28
Payer: MEDICARE

## 2023-04-28 NOTE — TELEPHONE ENCOUNTER
Patient was seen by Rose, from [facility] for his first visit since being discharged from the nursing home. On his second visit this week, patient BP was drastically different based on position patient was in: laying down was 132/70 and sitting up was 88/56.    Rose stated he is on lasik 20mg 2x weekly since the discharge and that since he returned home he has been weak and dizzy, wondering if RMB has suggestion if possibly he is losing too much fluid.    Rose, Nurse CB # [phone]

## 2023-05-01 NOTE — TELEPHONE ENCOUNTER
Patient spouse Gerri called and states patient has had uncontrollable hiccups for four days and she needs to be advised on what to do. Patient has [facility] scheduled for Friday but can not wait states it is very important.    Gerri call back number is [phone]

## 2023-05-02 NOTE — TELEPHONE ENCOUNTER
Patient wife has called again regarding his condition and wants you to talk to his nurse Rose Pressley [phone].

## 2023-05-02 NOTE — TELEPHONE ENCOUNTER
CATA did call patient's nurse yesterday 05/01/23 at 620pm and left a voicemail message for her to call him back.         Susana Ardon MA

## 2023-05-03 NOTE — TELEPHONE ENCOUNTER
CATA called the nurse carolyn on 05/02/23 @ 520pm and placed the labs needed.       Susana Ardno MA

## 2023-05-05 ENCOUNTER — TELEPHONE (OUTPATIENT)
Dept: PRIMARY CARE | Facility: CLINIC | Age: 83
End: 2023-05-05

## 2023-05-05 ENCOUNTER — OFFICE VISIT (OUTPATIENT)
Dept: PRIMARY CARE | Facility: CLINIC | Age: 83
End: 2023-05-05
Payer: MEDICARE

## 2023-05-05 ENCOUNTER — LAB (OUTPATIENT)
Dept: LAB | Facility: LAB | Age: 83
End: 2023-05-05
Payer: MEDICARE

## 2023-05-05 VITALS
DIASTOLIC BLOOD PRESSURE: 68 MMHG | WEIGHT: 188 LBS | HEART RATE: 79 BPM | SYSTOLIC BLOOD PRESSURE: 110 MMHG | BODY MASS INDEX: 27.76 KG/M2 | RESPIRATION RATE: 16 BRPM

## 2023-05-05 DIAGNOSIS — R79.0 LOW BLOOD MAGNESIUM LEVEL: ICD-10-CM

## 2023-05-05 DIAGNOSIS — R53.82 CHRONIC FATIGUE: ICD-10-CM

## 2023-05-05 DIAGNOSIS — R06.02 SOB (SHORTNESS OF BREATH): Primary | ICD-10-CM

## 2023-05-05 DIAGNOSIS — D64.9 ANEMIA, UNSPECIFIED TYPE: ICD-10-CM

## 2023-05-05 DIAGNOSIS — E78.5 HYPERLIPIDEMIA, UNSPECIFIED HYPERLIPIDEMIA TYPE: ICD-10-CM

## 2023-05-05 PROBLEM — S72.301D: Status: ACTIVE | Noted: 2023-05-05

## 2023-05-05 PROBLEM — C67.9 BLADDER CANCER (MULTI): Status: ACTIVE | Noted: 2023-05-05

## 2023-05-05 PROBLEM — E78.01 ESSENTIAL FAMILIAL HYPERCHOLESTEROLEMIA: Status: ACTIVE | Noted: 2023-05-05

## 2023-05-05 PROBLEM — Z98.890 HISTORY OF SHOULDER SURGERY: Status: ACTIVE | Noted: 2023-05-05

## 2023-05-05 PROBLEM — D12.6 TUBULAR ADENOMA OF COLON: Status: ACTIVE | Noted: 2023-05-05

## 2023-05-05 PROBLEM — M19.011 ARTHRITIS OF SHOULDER REGION, RIGHT: Status: ACTIVE | Noted: 2023-05-05

## 2023-05-05 PROBLEM — H25.812 MIXED TYPE AGE-RELATED CATARACT, LEFT EYE: Status: ACTIVE | Noted: 2023-05-05

## 2023-05-05 PROBLEM — R42 DIZZINESS: Status: ACTIVE | Noted: 2023-05-05

## 2023-05-05 PROBLEM — M81.0 OSTEOPOROSIS: Status: ACTIVE | Noted: 2023-05-05

## 2023-05-05 PROBLEM — H31.002 CHORIORETINAL SCAR OF LEFT EYE: Status: ACTIVE | Noted: 2023-05-05

## 2023-05-05 PROBLEM — H35.361 DRUSEN OF RIGHT MACULA: Status: ACTIVE | Noted: 2023-05-05

## 2023-05-05 PROBLEM — M54.16 LUMBAR NEURITIS: Status: ACTIVE | Noted: 2023-05-05

## 2023-05-05 PROBLEM — R10.30 GROIN PAIN: Status: ACTIVE | Noted: 2023-05-05

## 2023-05-05 PROBLEM — H25.11 AGE-RELATED NUCLEAR CATARACT OF RIGHT EYE: Status: ACTIVE | Noted: 2023-05-05

## 2023-05-05 PROBLEM — H02.839 DERMATOCHALASIS: Status: ACTIVE | Noted: 2023-05-05

## 2023-05-05 PROBLEM — R20.2 NUMBNESS AND TINGLING OF HAND: Status: ACTIVE | Noted: 2023-05-05

## 2023-05-05 PROBLEM — M79.606 LEG PAIN: Status: ACTIVE | Noted: 2023-05-05

## 2023-05-05 PROBLEM — H52.4 MYOPIA WITH PRESBYOPIA: Status: ACTIVE | Noted: 2023-05-05

## 2023-05-05 PROBLEM — M25.551 HIP PAIN, BILATERAL: Status: ACTIVE | Noted: 2023-05-05

## 2023-05-05 PROBLEM — M54.50 LOWER BACK PAIN: Status: ACTIVE | Noted: 2023-05-05

## 2023-05-05 PROBLEM — H25.011 CORTICAL AGE-RELATED CATARACT OF RIGHT EYE: Status: ACTIVE | Noted: 2023-05-05

## 2023-05-05 PROBLEM — M25.552 HIP PAIN, BILATERAL: Status: ACTIVE | Noted: 2023-05-05

## 2023-05-05 PROBLEM — H25.012 CORTICAL AGE-RELATED CATARACT OF LEFT EYE: Status: ACTIVE | Noted: 2023-05-05

## 2023-05-05 PROBLEM — M79.605 PAIN OF LEFT LOWER EXTREMITY: Status: ACTIVE | Noted: 2023-05-05

## 2023-05-05 PROBLEM — R73.01 ABNORMAL FASTING GLUCOSE: Status: ACTIVE | Noted: 2023-05-05

## 2023-05-05 PROBLEM — N28.1 CYST, KIDNEY, ACQUIRED: Status: ACTIVE | Noted: 2023-05-05

## 2023-05-05 PROBLEM — H25.813 COMBINED FORM OF AGE-RELATED CATARACT, BOTH EYES: Status: ACTIVE | Noted: 2023-05-05

## 2023-05-05 PROBLEM — G62.9 NEUROPATHY: Status: ACTIVE | Noted: 2023-05-05

## 2023-05-05 PROBLEM — M79.609 LIMB PAIN: Status: ACTIVE | Noted: 2023-05-05

## 2023-05-05 PROBLEM — H52.10 MYOPIA WITH PRESBYOPIA: Status: ACTIVE | Noted: 2023-05-05

## 2023-05-05 PROBLEM — H25.811 MIXED TYPE AGE-RELATED CATARACT, RIGHT EYE: Status: ACTIVE | Noted: 2023-05-05

## 2023-05-05 PROBLEM — R20.0 NUMBNESS AND TINGLING OF HAND: Status: ACTIVE | Noted: 2023-05-05

## 2023-05-05 PROBLEM — R91.8 ABNORMALITY OF LUNG ON CHEST X-RAY: Status: ACTIVE | Noted: 2023-05-05

## 2023-05-05 PROBLEM — K76.89 LIVER CYST: Status: ACTIVE | Noted: 2023-05-05

## 2023-05-05 PROBLEM — H25.12 AGE-RELATED NUCLEAR CATARACT OF LEFT EYE: Status: ACTIVE | Noted: 2023-05-05

## 2023-05-05 PROBLEM — M25.569 KNEE PAIN: Status: ACTIVE | Noted: 2023-05-05

## 2023-05-05 PROBLEM — R73.09 ELEVATED GLUCOSE: Status: ACTIVE | Noted: 2023-05-05

## 2023-05-05 PROBLEM — H43.399 VITREOUS FLOATERS: Status: ACTIVE | Noted: 2023-05-05

## 2023-05-05 PROBLEM — M76.31 ILIOTIBIAL BAND TENDINITIS OF RIGHT SIDE: Status: ACTIVE | Noted: 2023-05-05

## 2023-05-05 PROBLEM — M25.519 SHOULDER PAIN: Status: ACTIVE | Noted: 2023-05-05

## 2023-05-05 PROBLEM — S72.92XA FEMUR FRACTURE, LEFT (MULTI): Status: ACTIVE | Noted: 2023-05-05

## 2023-05-05 PROBLEM — G56.03 CARPAL TUNNEL SYNDROME, BILATERAL UPPER LIMBS: Status: ACTIVE | Noted: 2023-05-05

## 2023-05-05 LAB
ALANINE AMINOTRANSFERASE (SGPT) (U/L) IN SER/PLAS: 26 U/L (ref 10–52)
ALBUMIN (G/DL) IN SER/PLAS: 3 G/DL (ref 3.4–5)
ALKALINE PHOSPHATASE (U/L) IN SER/PLAS: 181 U/L (ref 33–136)
ANION GAP IN SER/PLAS: 17 MMOL/L (ref 10–20)
ASPARTATE AMINOTRANSFERASE (SGOT) (U/L) IN SER/PLAS: 36 U/L (ref 9–39)
BASOPHILS (10*3/UL) IN BLOOD BY AUTOMATED COUNT: 0.07 X10E9/L (ref 0–0.1)
BASOPHILS/100 LEUKOCYTES IN BLOOD BY AUTOMATED COUNT: 0.5 % (ref 0–2)
BILIRUBIN TOTAL (MG/DL) IN SER/PLAS: 0.4 MG/DL (ref 0–1.2)
CALCIUM (MG/DL) IN SER/PLAS: 9 MG/DL (ref 8.6–10.3)
CARBON DIOXIDE, TOTAL (MMOL/L) IN SER/PLAS: 22 MMOL/L (ref 21–32)
CHLORIDE (MMOL/L) IN SER/PLAS: 104 MMOL/L (ref 98–107)
CHOLESTEROL (MG/DL) IN SER/PLAS: 143 MG/DL (ref 0–199)
CHOLESTEROL IN HDL (MG/DL) IN SER/PLAS: 13.8 MG/DL
CHOLESTEROL/HDL RATIO: 10.4
CREATININE (MG/DL) IN SER/PLAS: 1.6 MG/DL (ref 0.5–1.3)
EOSINOPHILS (10*3/UL) IN BLOOD BY AUTOMATED COUNT: 0.36 X10E9/L (ref 0–0.4)
EOSINOPHILS/100 LEUKOCYTES IN BLOOD BY AUTOMATED COUNT: 2.5 % (ref 0–6)
ERYTHROCYTE DISTRIBUTION WIDTH (RATIO) BY AUTOMATED COUNT: 17 % (ref 11.5–14.5)
ERYTHROCYTE MEAN CORPUSCULAR HEMOGLOBIN CONCENTRATION (G/DL) BY AUTOMATED: 29.9 G/DL (ref 32–36)
ERYTHROCYTE MEAN CORPUSCULAR VOLUME (FL) BY AUTOMATED COUNT: 95 FL (ref 80–100)
ERYTHROCYTES (10*6/UL) IN BLOOD BY AUTOMATED COUNT: 3.18 X10E12/L (ref 4.5–5.9)
GFR MALE: 42 ML/MIN/1.73M2
GLUCOSE (MG/DL) IN SER/PLAS: 99 MG/DL (ref 74–99)
HEMATOCRIT (%) IN BLOOD BY AUTOMATED COUNT: 30.1 % (ref 41–52)
HEMOGLOBIN (G/DL) IN BLOOD: 9 G/DL (ref 13.5–17.5)
IMMATURE GRANULOCYTES/100 LEUKOCYTES IN BLOOD BY AUTOMATED COUNT: 2.2 % (ref 0–0.9)
IRON (UG/DL) IN SER/PLAS: 15 UG/DL (ref 35–150)
IRON BINDING CAPACITY (UG/DL) IN SER/PLAS: 145 UG/DL (ref 240–445)
IRON SATURATION (%) IN SER/PLAS: 10 % (ref 25–45)
LDL: 76 MG/DL (ref 0–99)
LEUKOCYTES (10*3/UL) IN BLOOD BY AUTOMATED COUNT: 14.1 X10E9/L (ref 4.4–11.3)
LYMPHOCYTES (10*3/UL) IN BLOOD BY AUTOMATED COUNT: 1.84 X10E9/L (ref 0.8–3)
LYMPHOCYTES/100 LEUKOCYTES IN BLOOD BY AUTOMATED COUNT: 13 % (ref 13–44)
MAGNESIUM (MG/DL) IN SER/PLAS: 2.8 MG/DL (ref 1.6–2.4)
MONOCYTES (10*3/UL) IN BLOOD BY AUTOMATED COUNT: 0.9 X10E9/L (ref 0.05–0.8)
MONOCYTES/100 LEUKOCYTES IN BLOOD BY AUTOMATED COUNT: 6.4 % (ref 2–10)
NEUTROPHILS (10*3/UL) IN BLOOD BY AUTOMATED COUNT: 10.65 X10E9/L (ref 1.6–5.5)
NEUTROPHILS/100 LEUKOCYTES IN BLOOD BY AUTOMATED COUNT: 75.4 % (ref 40–80)
NON HDL CHOLESTEROL: 129 MG/DL
PLATELETS (10*3/UL) IN BLOOD AUTOMATED COUNT: 656 X10E9/L (ref 150–450)
POTASSIUM (MMOL/L) IN SER/PLAS: 4.4 MMOL/L (ref 3.5–5.3)
PROTEIN TOTAL: 6.6 G/DL (ref 6.4–8.2)
SODIUM (MMOL/L) IN SER/PLAS: 139 MMOL/L (ref 136–145)
THYROTROPIN (MIU/L) IN SER/PLAS BY DETECTION LIMIT <= 0.05 MIU/L: 1.12 MIU/L (ref 0.44–3.98)
TRIGLYCERIDE (MG/DL) IN SER/PLAS: 266 MG/DL (ref 0–149)
UREA NITROGEN (MG/DL) IN SER/PLAS: 44 MG/DL (ref 6–23)
VLDL: 53 MG/DL (ref 0–40)

## 2023-05-05 PROCEDURE — 83735 ASSAY OF MAGNESIUM: CPT

## 2023-05-05 PROCEDURE — 83540 ASSAY OF IRON: CPT

## 2023-05-05 PROCEDURE — 36415 COLL VENOUS BLD VENIPUNCTURE: CPT

## 2023-05-05 PROCEDURE — 83550 IRON BINDING TEST: CPT

## 2023-05-05 PROCEDURE — 99215 OFFICE O/P EST HI 40 MIN: CPT | Performed by: INTERNAL MEDICINE

## 2023-05-05 RX ORDER — CYCLOBENZAPRINE HCL 5 MG
TABLET ORAL 3 TIMES DAILY PRN
COMMUNITY
End: 2023-07-31 | Stop reason: ALTCHOICE

## 2023-05-05 RX ORDER — DEXTROMETHORPHAN HBR AND GUAIFENESIN 5; 100 MG/5ML; MG/5ML
10 LIQUID ORAL EVERY 4 HOURS PRN
COMMUNITY
End: 2023-07-31 | Stop reason: ALTCHOICE

## 2023-05-05 RX ORDER — DOCUSATE SODIUM 100 MG/1
CAPSULE, LIQUID FILLED ORAL 2 TIMES DAILY
COMMUNITY
Start: 2021-11-18 | End: 2023-07-31 | Stop reason: ALTCHOICE

## 2023-05-05 RX ORDER — FENTANYL 12.5 UG/1
PATCH TRANSDERMAL
COMMUNITY
Start: 2023-02-28 | End: 2023-07-31 | Stop reason: ALTCHOICE

## 2023-05-05 RX ORDER — GLUCOSAM/CHONDRO/HERB 149/HYAL 750-100 MG
TABLET ORAL
COMMUNITY
End: 2023-07-31 | Stop reason: ALTCHOICE

## 2023-05-05 RX ORDER — ADHESIVE BANDAGE
30 BANDAGE TOPICAL
COMMUNITY
End: 2023-07-31 | Stop reason: ALTCHOICE

## 2023-05-05 RX ORDER — SIMVASTATIN 20 MG/1
1 TABLET, FILM COATED ORAL DAILY
COMMUNITY
Start: 2012-12-10 | End: 2023-09-18 | Stop reason: SDUPTHER

## 2023-05-05 RX ORDER — AMOXICILLIN 250 MG
CAPSULE ORAL 2 TIMES DAILY
COMMUNITY
Start: 2023-02-09 | End: 2023-07-31 | Stop reason: ALTCHOICE

## 2023-05-05 RX ORDER — ONDANSETRON 4 MG/1
TABLET, ORALLY DISINTEGRATING ORAL EVERY 6 HOURS
COMMUNITY
Start: 2023-02-09

## 2023-05-05 RX ORDER — ALUMINUM HYDROXIDE, MAGNESIUM HYDROXIDE, AND SIMETHICONE 1200; 120; 1200 MG/30ML; MG/30ML; MG/30ML
30 SUSPENSION ORAL EVERY 6 HOURS PRN
COMMUNITY
End: 2023-07-31 | Stop reason: ALTCHOICE

## 2023-05-05 RX ORDER — ASPIRIN 325 MG
TABLET, DELAYED RELEASE (ENTERIC COATED) ORAL 2 TIMES DAILY
COMMUNITY
Start: 2021-11-18 | End: 2023-07-31 | Stop reason: ALTCHOICE

## 2023-05-05 RX ORDER — CHOLECALCIFEROL (VITAMIN D3) 25 MCG
TABLET ORAL
COMMUNITY

## 2023-05-05 RX ORDER — MULTIVIT-MIN/IRON FUM/FOLIC AC 7.5 MG-4
1 TABLET ORAL DAILY
COMMUNITY
End: 2023-07-31 | Stop reason: ALTCHOICE

## 2023-05-05 RX ORDER — ATORVASTATIN CALCIUM 10 MG/1
1 TABLET, FILM COATED ORAL DAILY
COMMUNITY
End: 2023-07-31 | Stop reason: ALTCHOICE

## 2023-05-05 RX ORDER — CALCIUM CARBONATE/VITAMIN D3 600MG-5MCG
TABLET ORAL 2 TIMES DAILY
COMMUNITY

## 2023-05-05 RX ORDER — BISACODYL 10 MG/1
SUPPOSITORY RECTAL
COMMUNITY
End: 2023-07-31 | Stop reason: ALTCHOICE

## 2023-05-05 RX ORDER — ACETAMINOPHEN 500 MG
TABLET ORAL EVERY 6 HOURS
COMMUNITY
Start: 2023-02-09 | End: 2023-07-31 | Stop reason: ALTCHOICE

## 2023-05-05 RX ORDER — PIPERACILLIN SODIUM, TAZOBACTAM SODIUM 3; .375 G/15ML; G/15ML
3.38 INJECTION, POWDER, LYOPHILIZED, FOR SOLUTION INTRAVENOUS EVERY 6 HOURS
COMMUNITY
Start: 2023-02-28 | End: 2023-07-31 | Stop reason: ALTCHOICE

## 2023-05-05 RX ORDER — TALC
POWDER (GRAM) TOPICAL
COMMUNITY
Start: 2021-11-18 | End: 2023-07-31 | Stop reason: ALTCHOICE

## 2023-05-05 NOTE — TELEPHONE ENCOUNTER
Patient home care nurse called to let us know she was unsuccessful in getting patients labs today. I have entered the verbal orders you gave her, in our system for pt. to get today here at [facility].

## 2023-05-05 NOTE — PROGRESS NOTES
Subjective   Patient ID: Dakota Pugh is a 83 y.o. male who presents for Hospital Follow-up, Hiccups (Body jump), Iron Deficiency, and Extremity Weakness (Both legs left leg more then right).    Gastric perforation- abdominal abscess- Treated with IV ABX    Anemia    Fe-deficiency    Hyponatremia    L/E weakness    Fatigue      HPI     Gastric perforation- abdominal abscess- Treated with IV ABX    Anemia    Fe-deficiency    Hyponatremia    L/E weakness    Fatigue    Review of Systems    Objective   /68 (BP Location: Left arm, Patient Position: Sitting, BP Cuff Size: Small adult)   Pulse 79   Resp 16   Wt 85.3 kg (188 lb)   BMI 27.76 kg/m²     Physical Exam    Tired appearing    + Pallor    No JVP elevation. No palpable Lymph Nodes. No Thyromegaly    CVS-NL S1/S2 . No MRG    Lungs-CTA. B/S decreased bilateral    Abdomen-Soft, Non-tender. No masses or HSM    Extremities: No C/C/E      Assessment/Plan        Gastric perforation- abdominal abscess- Treated with IV ABX    Anemia    Fe-deficiency    Hyponatremia    L/E weakness    Fatigue    Plan:    Labs    CXR-R/o pleural effusion    ? Home Fe infusion    ? PT  Long discussion with Pt and his wife and son, Prakash regarding  medicine options    ? Hospitalization pending lab results    Follow up PRN if symptoms persist or worsen

## 2023-05-08 ENCOUNTER — TELEPHONE (OUTPATIENT)
Dept: PRIMARY CARE | Facility: CLINIC | Age: 83
End: 2023-05-08
Payer: MEDICARE

## 2023-05-11 ENCOUNTER — NURSING HOME VISIT (OUTPATIENT)
Dept: POST ACUTE CARE | Facility: EXTERNAL LOCATION | Age: 83
End: 2023-05-11
Payer: MEDICARE

## 2023-05-11 DIAGNOSIS — E78.5 HYPERLIPIDEMIA, UNSPECIFIED HYPERLIPIDEMIA TYPE: ICD-10-CM

## 2023-05-11 DIAGNOSIS — Z79.899 ENCOUNTER FOR MEDICATION REVIEW: ICD-10-CM

## 2023-05-11 DIAGNOSIS — D63.1 ANEMIA DUE TO STAGE 3A CHRONIC KIDNEY DISEASE (MULTI): ICD-10-CM

## 2023-05-11 DIAGNOSIS — R53.81 PHYSICAL DECONDITIONING: Primary | ICD-10-CM

## 2023-05-11 DIAGNOSIS — I10 PRIMARY HYPERTENSION: ICD-10-CM

## 2023-05-11 DIAGNOSIS — N39.0 URINARY TRACT INFECTION WITHOUT HEMATURIA, SITE UNSPECIFIED: ICD-10-CM

## 2023-05-11 DIAGNOSIS — E46 PROTEIN-CALORIE MALNUTRITION, UNSPECIFIED SEVERITY (MULTI): ICD-10-CM

## 2023-05-11 DIAGNOSIS — Z01.89 LABORATORY EXAMINATION: ICD-10-CM

## 2023-05-11 DIAGNOSIS — N18.31 ANEMIA DUE TO STAGE 3A CHRONIC KIDNEY DISEASE (MULTI): ICD-10-CM

## 2023-05-11 DIAGNOSIS — F32.A DEPRESSION, UNSPECIFIED DEPRESSION TYPE: ICD-10-CM

## 2023-05-11 DIAGNOSIS — K27.9 PEPTIC ULCER: ICD-10-CM

## 2023-05-11 PROCEDURE — 99310 SBSQ NF CARE HIGH MDM 45: CPT | Performed by: NURSE PRACTITIONER

## 2023-05-11 ASSESSMENT — ENCOUNTER SYMPTOMS
WHEEZING: 0
LIGHT-HEADEDNESS: 0
NAUSEA: 0
COUGH: 0
VOMITING: 0
CONSTIPATION: 0
DIZZINESS: 0
CHILLS: 0
DYSURIA: 0
AGITATION: 0
ABDOMINAL PAIN: 0
DIARRHEA: 0
FEVER: 0
APPETITE CHANGE: 0
MUSCULOSKELETAL NEGATIVE: 1
PALPITATIONS: 0
FATIGUE: 0
ABDOMINAL DISTENTION: 0
BLOOD IN STOOL: 0
SHORTNESS OF BREATH: 0
CONFUSION: 0

## 2023-05-11 NOTE — LETTER
Patient: Dakota Pugh  : 1940    Encounter Date: 2023    Subjective   Patient ID: Dakota Pugh is a 83 y.o. male who is acute skilled care and presents for initial visit for skilled nursing.    HPI   A 83 year old Male with a past medical history significant for hypertension, chronic kidney disease stage III,  history of peptic ulcer disease s/p gastric ulcer perforation repair with omental patching which was complicated by intra-abdominal abscesses for which she was on antibiotics and a drain was successfully treated removed sent to rehab facility when he did very well with therapy and discharged home on 23. Per patient's wife patient was sent to ER 23 due to abnormal blood work; leukocytosis; found to have UTI and anemia hgb 6.8 received blood transfusion. He also received IV fluids CINDI. UTI initially treatment with IV Zosyn and then switched to Augmentin on discharge. Gastroenterology was consulted and he underwent an endoscopy which showed a stable ulcer but no bleed.Recommendation is to continue PPI twice a day for life. Discharged to  for rehab. See Ros.   Review of Systems   Constitutional:  Negative for appetite change, chills, fatigue and fever.   Respiratory:  Negative for cough, shortness of breath and wheezing.    Cardiovascular:  Negative for chest pain, palpitations and leg swelling.   Gastrointestinal:  Negative for abdominal distention, abdominal pain, blood in stool, constipation, diarrhea, nausea and vomiting.   Genitourinary:  Negative for dysuria.   Musculoskeletal: Negative.    Neurological:  Negative for dizziness, syncope and light-headedness.   Psychiatric/Behavioral:  Negative for agitation, behavioral problems and confusion.    Objective   Weight: 166.0 2023 07:18  Blood Pressure: 131 /  62 2023 01:21  Temperature: 98.4 2023 01:21  Pulse: 61 2023 01:21  Respirations: 18 2023 01:21  Blood Sugar: 92.0 4/3/2023 12:46  O2 sats: 95.0  % 5/11/2023 01:21  Height: 70.0 3/2/2023 17:07  Pain Level: 0 4/25/2023 07:55    Physical Exam  Constitutional:       General: He is not in acute distress.     Appearance: Normal appearance. He is normal weight. He is not ill-appearing or toxic-appearing.   HENT:      Head: Normocephalic and atraumatic.      Nose: Nose normal.      Mouth/Throat:      Mouth: Mucous membranes are moist.   Eyes:      General: No scleral icterus.     Extraocular Movements: Extraocular movements intact.      Conjunctiva/sclera: Conjunctivae normal.   Cardiovascular:      Rate and Rhythm: Normal rate and regular rhythm.      Pulses: Normal pulses.      Heart sounds: Normal heart sounds. No murmur heard.  Pulmonary:      Effort: Pulmonary effort is normal. No respiratory distress.      Breath sounds: Normal breath sounds. No wheezing, rhonchi or rales.   Abdominal:      General: Abdomen is flat. Bowel sounds are normal. There is no distension.      Palpations: Abdomen is soft.      Tenderness: There is abdominal tenderness. There is rebound. There is no guarding.   Musculoskeletal:         General: No swelling, tenderness, deformity or signs of injury. Normal range of motion.      Cervical back: Normal range of motion and neck supple. No tenderness.      Right lower leg: No edema.      Left lower leg: No edema.   Skin:     General: Skin is warm.      Coloration: Skin is not jaundiced.   Neurological:      Mental Status: He is alert and oriented to person, place, and time. Mental status is at baseline.   Psychiatric:         Mood and Affect: Mood normal.         Behavior: Behavior normal.     Lab result on 5/11/23  CBC       White Blood Cell Count  7.37 k/uL 3.70-11.00  Final           RBC  3.61 m/uL 4.20-6.00 L Final           Hemoglobin  10.1 g/dL 13.0-17.0 L Final           Hematocrit  33.0 % 39.0-51.0 L Final           MCV  91.4 fL 80.0-100.0  Final           MCH  28.0 pg 26.0-34.0  Final           MCHC  30.6 g/dL 30.5-36.0  Final            RDW-CV  15.8 % 11.5-15.0 H Final           Platelet Count  461 k/uL 150-400 H Final           MPV  8.8 fL 9.0-12.7 L Final           Absolute nRBC  <0.01 k/uL <0.01  Final       Basic Metabolic Panl       Glucose  104 mg/dL 74-99 H Final      The American Diabetes Association (ADA) provides guidance for cutoff values for fasting glucose and random glucose. The ADA defines fasting as no caloric intake for at least 8 hours. Fasting plasma glucose results between 100 to 125 mg/dL indicate increased risk for diabetes (prediabetes).  Fasting plasma glucose results greater than or equal to 126 mg/dL meet the criteria for diagnosis of diabetes. In the absence of unequivocal hyperglycemia, results should be confirmed by repeat testing. In a patient with classic symptoms of hyperglycemia or hyperglycemic crisis, random plasma glucose results greater than or equal to 200 mg/dL meet the criteria for diagnosis of diabetes.  Reference: Standards of Medical Care in Diabetes 2016, American Diabetes Association. Diabetes Care. 2016.39(Suppl 1).       BUN  18 mg/dL 9-24  Final           Creatinine  1.33 mg/dL 0.73-1.22 H Final           Sodium  137 mmol/L 136-144  Final           Potassium  3.6 mmol/L 3.7-5.1 L Final           Chloride  104 mmol/L   Final           CO2  21 mmol/L 22-30 L Final           Anion Gap  12 mmol/L 9-18  Final           Calcium, Total  9.3 mg/dL 8.5-10.2  Final           Estimated Glomerular Filtration Rate  53 mL/min/1.73 meters squared >=60 L Final      Estimated Glomerular Filtration Rate (eGFR) is calculated using the 2021 CKD-EPI creatinine equation. This equation utilizes serum creatinine, sex, and age as parameters. The creatinine assay has traceable calibration to isotope dilution-mass spectrometry. Refer to KDIGO guidelines for clinical interpretation. In patients with unstable renal function, e.g. those with acute kidney injury, the eGFR may not accurately reflect actual  GFR.    Assessment/Plan   #Physical deconditioning   - continue PT/OT  #UTI  - continue Augmentin, stop date 5/12/23  - continue monitor weekly cbc   - afebrile and no leukocytosis   #Anemia; s/p blood transfusion, current hgb 10 today  #history of peptic ulcer disease s/p gastric ulcer perforation repair with omental patching which was complicated by intra-abdominal abscesses   - No source of active bleeding  - continue Iron pill  - continue monitor cbc weekly  - Gastroenterology was consulted and he underwent an endoscopy which showed a stable ulcer but no bleed. Recommendation is to continue PPI twice a day for life  #hypertension; continue monitor BP   #chronic kidney disease stage III  - continue PO fluid  - avoid nephrotoxic   - renal med   - continue monitor BMP weekly   #Depression  - continue with Citalopram   #peptic ulcer   - Continue Protonix 40 mg PO BID life long   #Malnutrition  - continue regular diet  #HLD  - c/w Atorvastatin 10 mg PO daily  #Med reviewed  #Lab examined    Time spending 45 minutes   -reviewed of hospital record via EMR and reconciled medication in PCC and EMR (d/c summary). Reviewed orders, medications, records, and pertinent labs/x-rays from PCC. Reviewed VS, BS, O2, etc as per protocol. Reviewed and signed off orders, medications, Labs, x-rays, and current diagnoses in PCC  -Obtained history  -Performing physical examination  -Counseling and educating patient for pain management   -Ordering medications, lab   -Documenting clinical information in the [facility]   -Care coordinating with nursing staff         Electronically Signed By: TEAGAN Chapman   5/11/23 11:27 PM

## 2023-05-12 ENCOUNTER — NURSING HOME VISIT (OUTPATIENT)
Dept: POST ACUTE CARE | Facility: EXTERNAL LOCATION | Age: 83
End: 2023-05-12
Payer: MEDICARE

## 2023-05-12 DIAGNOSIS — D63.1 ANEMIA DUE TO STAGE 3A CHRONIC KIDNEY DISEASE (MULTI): ICD-10-CM

## 2023-05-12 DIAGNOSIS — R53.83 FATIGUE, UNSPECIFIED TYPE: ICD-10-CM

## 2023-05-12 DIAGNOSIS — K31.6 GASTROCUTANEOUS FISTULA: ICD-10-CM

## 2023-05-12 DIAGNOSIS — N17.9 AKI (ACUTE KIDNEY INJURY) (CMS-HCC): ICD-10-CM

## 2023-05-12 DIAGNOSIS — R53.81 PHYSICAL DECONDITIONING: ICD-10-CM

## 2023-05-12 DIAGNOSIS — F33.41 RECURRENT MAJOR DEPRESSIVE DISORDER, IN PARTIAL REMISSION (CMS-HCC): ICD-10-CM

## 2023-05-12 DIAGNOSIS — R06.02 SOB (SHORTNESS OF BREATH): ICD-10-CM

## 2023-05-12 DIAGNOSIS — K27.4 PEPTIC ULCER DISEASE WITH HEMORRHAGE: ICD-10-CM

## 2023-05-12 DIAGNOSIS — K65.1 INTRA-ABDOMINAL ABSCESS (MULTI): ICD-10-CM

## 2023-05-12 DIAGNOSIS — N30.00 ACUTE CYSTITIS WITHOUT HEMATURIA: Primary | ICD-10-CM

## 2023-05-12 DIAGNOSIS — N18.31 ANEMIA DUE TO STAGE 3A CHRONIC KIDNEY DISEASE (MULTI): ICD-10-CM

## 2023-05-12 DIAGNOSIS — E43 SEVERE PROTEIN-CALORIE MALNUTRITION (MULTI): ICD-10-CM

## 2023-05-12 DIAGNOSIS — M79.605 PAIN OF LEFT LOWER EXTREMITY: ICD-10-CM

## 2023-05-12 DIAGNOSIS — D72.825 BANDEMIA: ICD-10-CM

## 2023-05-12 DIAGNOSIS — R10.13 EPIGASTRIC PAIN: ICD-10-CM

## 2023-05-12 PROCEDURE — 99306 1ST NF CARE HIGH MDM 50: CPT | Performed by: FAMILY MEDICINE

## 2023-05-12 NOTE — PROGRESS NOTES
Subjective   Patient ID: Dakota Pugh is a 83 y.o. male who is acute skilled care and presents for initial visit for skilled nursing.    HPI   A 83 year old Male with a past medical history significant for hypertension, chronic kidney disease stage III,  history of peptic ulcer disease s/p gastric ulcer perforation repair with omental patching which was complicated by intra-abdominal abscesses for which she was on antibiotics and a drain was successfully treated removed sent to rehab facility when he did very well with therapy and discharged home on 4/24/23. Per patient's wife patient was sent to ER 5/5/23 due to abnormal blood work; leukocytosis; found to have UTI and anemia hgb 6.8 received blood transfusion. He also received IV fluids CINDI. UTI initially treatment with IV Zosyn and then switched to Augmentin on discharge. Gastroenterology was consulted and he underwent an endoscopy which showed a stable ulcer but no bleed.Recommendation is to continue PPI twice a day for life. Discharged to  for rehab. See Ros.   Review of Systems   Constitutional:  Negative for appetite change, chills, fatigue and fever.   Respiratory:  Negative for cough, shortness of breath and wheezing.    Cardiovascular:  Negative for chest pain, palpitations and leg swelling.   Gastrointestinal:  Negative for abdominal distention, abdominal pain, blood in stool, constipation, diarrhea, nausea and vomiting.   Genitourinary:  Negative for dysuria.   Musculoskeletal: Negative.    Neurological:  Negative for dizziness, syncope and light-headedness.   Psychiatric/Behavioral:  Negative for agitation, behavioral problems and confusion.    Objective   Weight: 166.0 4/25/2023 07:18  Blood Pressure: 131 /  62 5/11/2023 01:21  Temperature: 98.4 5/11/2023 01:21  Pulse: 61 5/11/2023 01:21  Respirations: 18 5/11/2023 01:21  Blood Sugar: 92.0 4/3/2023 12:46  O2 sats: 95.0 % 5/11/2023 01:21  Height: 70.0 3/2/2023 17:07  Pain  Level: 0 4/25/2023 07:55    Physical Exam  Constitutional:       General: He is not in acute distress.     Appearance: Normal appearance. He is normal weight. He is not ill-appearing or toxic-appearing.   HENT:      Head: Normocephalic and atraumatic.      Nose: Nose normal.      Mouth/Throat:      Mouth: Mucous membranes are moist.   Eyes:      General: No scleral icterus.     Extraocular Movements: Extraocular movements intact.      Conjunctiva/sclera: Conjunctivae normal.   Cardiovascular:      Rate and Rhythm: Normal rate and regular rhythm.      Pulses: Normal pulses.      Heart sounds: Normal heart sounds. No murmur heard.  Pulmonary:      Effort: Pulmonary effort is normal. No respiratory distress.      Breath sounds: Normal breath sounds. No wheezing, rhonchi or rales.   Abdominal:      General: Abdomen is flat. Bowel sounds are normal. There is no distension.      Palpations: Abdomen is soft.      Tenderness: There is abdominal tenderness. There is rebound. There is no guarding.   Musculoskeletal:         General: No swelling, tenderness, deformity or signs of injury. Normal range of motion.      Cervical back: Normal range of motion and neck supple. No tenderness.      Right lower leg: No edema.      Left lower leg: No edema.   Skin:     General: Skin is warm.      Coloration: Skin is not jaundiced.   Neurological:      Mental Status: He is alert and oriented to person, place, and time. Mental status is at baseline.   Psychiatric:         Mood and Affect: Mood normal.         Behavior: Behavior normal.     Lab result on 5/11/23  CBC       White Blood Cell Count  7.37 k/uL 3.70-11.00  Final           RBC  3.61 m/uL 4.20-6.00 L Final           Hemoglobin  10.1 g/dL 13.0-17.0 L Final           Hematocrit  33.0 % 39.0-51.0 L Final           MCV  91.4 fL 80.0-100.0  Final           MCH  28.0 pg 26.0-34.0  Final           MCHC  30.6 g/dL 30.5-36.0  Final           RDW-CV  15.8 % 11.5-15.0 H Final            Platelet Count  461 k/uL 150-400 H Final           MPV  8.8 fL 9.0-12.7 L Final           Absolute nRBC  <0.01 k/uL <0.01  Final       Basic Metabolic Panl       Glucose  104 mg/dL 74-99 H Final      The American Diabetes Association (ADA) provides guidance for cutoff values for fasting glucose and random glucose. The ADA defines fasting as no caloric intake for at least 8 hours. Fasting plasma glucose results between 100 to 125 mg/dL indicate increased risk for diabetes (prediabetes).  Fasting plasma glucose results greater than or equal to 126 mg/dL meet the criteria for diagnosis of diabetes. In the absence of unequivocal hyperglycemia, results should be confirmed by repeat testing. In a patient with classic symptoms of hyperglycemia or hyperglycemic crisis, random plasma glucose results greater than or equal to 200 mg/dL meet the criteria for diagnosis of diabetes.  Reference: Standards of Medical Care in Diabetes 2016, American Diabetes Association. Diabetes Care. 2016.39(Suppl 1).       BUN  18 mg/dL 9-24  Final           Creatinine  1.33 mg/dL 0.73-1.22 H Final           Sodium  137 mmol/L 136-144  Final           Potassium  3.6 mmol/L 3.7-5.1 L Final           Chloride  104 mmol/L   Final           CO2  21 mmol/L 22-30 L Final           Anion Gap  12 mmol/L 9-18  Final           Calcium, Total  9.3 mg/dL 8.5-10.2  Final           Estimated Glomerular Filtration Rate  53 mL/min/1.73 meters squared >=60 L Final      Estimated Glomerular Filtration Rate (eGFR) is calculated using the 2021 CKD-EPI creatinine equation. This equation utilizes serum creatinine, sex, and age as parameters. The creatinine assay has traceable calibration to isotope dilution-mass spectrometry. Refer to KDIGO guidelines for clinical interpretation. In patients with unstable renal function, e.g. those with acute kidney injury, the eGFR may not accurately reflect actual GFR.    Assessment/Plan   #Physical deconditioning   - continue  PT/OT  #UTI  - continue Augmentin, stop date 5/12/23  - continue monitor weekly cbc   - afebrile and no leukocytosis   #Anemia; s/p blood transfusion, current hgb 10 today  #history of peptic ulcer disease s/p gastric ulcer perforation repair with omental patching which was complicated by intra-abdominal abscesses   - No source of active bleeding  - continue Iron pill  - continue monitor cbc weekly  - Gastroenterology was consulted and he underwent an endoscopy which showed a stable ulcer but no bleed. Recommendation is to continue PPI twice a day for life  #hypertension; continue monitor BP   #chronic kidney disease stage III  - continue PO fluid  - avoid nephrotoxic   - renal med   - continue monitor BMP weekly   #Depression  - continue with Citalopram   #peptic ulcer   - Continue Protonix 40 mg PO BID life long   #Malnutrition  - continue regular diet  #HLD  - c/w Atorvastatin 10 mg PO daily  #Med reviewed  #Lab examined    Time spending 45 minutes   -reviewed of hospital record via EMR and reconciled medication in [facility] and EMR (d/c summary). Reviewed orders, medications, records, and pertinent labs/x-rays from [facility]. Reviewed VS, BS, O2, etc as per protocol. Reviewed and signed off orders, medications, Labs, x-rays, and current diagnoses in [facility]  -Obtained history  -Performing physical examination  -Counseling and educating patient for pain management   -Ordering medications, lab   -Documenting clinical information in the [facility]   -Care coordinating with nursing staff

## 2023-05-12 NOTE — LETTER
Patient: Dakota Puhg  : 1940    Encounter Date: 2023    Admission H and P    Subjective  Patient ID: Dakota Pugh is a 83 y.o. male who is acute skilled care and presents for initial visit for skilled nursing.    HPI   A 83 year old Male with a past medical history significant for hypertension, chronic kidney disease stage III,  history of peptic ulcer disease s/p gastric ulcer perforation repair with omental patching which was complicated by intra-abdominal abscesses for which he was on antibiotics and a drain was successfully treated removed sent to rehab facility when he did very well with therapy and discharged home on 23. Per patient's wife patient was sent to ER 23 due to abnormal blood work; leukocytosis; found to have UTI and anemia hgb 6.8 received blood transfusion. He also received IV fluids CINDI. UTI initially treatment with IV Zosyn and then switched to Augmentin on discharge. Gastroenterology was consulted and he underwent an endoscopy which showed a stable ulcer but no bleed.Recommendation is to continue PPI twice a day for life. Discharged to  for rehab  Has been tolerating po but poor intake  Review of Systems   Constitutional:  Negative for appetite change, chills, fatigue and fever.   Respiratory:  Negative for cough, shortness of breath and wheezing.    Cardiovascular:  Negative for chest pain, palpitations and leg swelling.   Gastrointestinal:  Negative for abdominal distention, abdominal pain, blood in stool, constipation, diarrhea, nausea and vomiting.   Genitourinary:  Negative for dysuria.   Musculoskeletal: Negative.    Neurological:  Negative for dizziness, syncope and light-headedness.   Psychiatric/Behavioral:  Negative for agitation, behavioral problems and confusion.    Objective  Weight: 166.0 2023 07:18  Blood Pressure: 131 /  62 2023 01:21  Temperature: 98.4 2023 01:21  Pulse: 61 2023 01:21  Respirations: 18 2023 01:21  Blood  Sugar: 92.0 4/3/2023 12:46  O2 sats: 95.0 % 5/11/2023 01:21  Height: 70.0 3/2/2023 17:07  Pain Level: 0 4/25/2023 07:55    Physical Exam  Constitutional:       General: He is not in acute distress.     Appearance: Normal appearance. He is normal weight. He is not ill-appearing or toxic-appearing.   HENT:      Head: Normocephalic and atraumatic.      Nose: Nose normal.      Mouth/Throat:      Mouth: Mucous membranes are moist.   Eyes:      General: No scleral icterus.     Extraocular Movements: Extraocular movements intact.      Conjunctiva/sclera: Conjunctivae normal.   Cardiovascular:      Rate and Rhythm: Normal rate and regular rhythm.      Pulses: Normal pulses.      Heart sounds: Normal heart sounds. No murmur heard.  Pulmonary:      Effort: Pulmonary effort is normal. No respiratory distress.      Breath sounds: Normal breath sounds. No wheezing, rhonchi or rales.   Abdominal:      General: Abdomen is flat. Bowel sounds are normal. There is no distension.      Palpations: Abdomen is soft.      Tenderness: There is abdominal tenderness. There is rebound. There is no guarding.   Musculoskeletal:         General: No swelling, tenderness, deformity or signs of injury. Normal range of motion.      Cervical back: Normal range of motion and neck supple. No tenderness.      Right lower leg: No edema.      Left lower leg: No edema.   Skin:     General: Skin is warm.      Coloration: Skin is not jaundiced.   Neurological:      Mental Status: He is alert and oriented to person, place, and time. Mental status is at baseline.   Psychiatric:         Mood and Affect: Mood normal.         Behavior: Behavior normal.     Lab result on 5/11/23  CBC       White Blood Cell Count  7.37 k/uL 3.70-11.00  Final           RBC  3.61 m/uL 4.20-6.00 L Final           Hemoglobin  10.1 g/dL 13.0-17.0 L Final           Hematocrit  33.0 % 39.0-51.0 L Final           MCV  91.4 fL 80.0-100.0  Final           MCH  28.0 pg 26.0-34.0  Final            MCHC  30.6 g/dL 30.5-36.0  Final           RDW-CV  15.8 % 11.5-15.0 H Final           Platelet Count  461 k/uL 150-400 H Final           MPV  8.8 fL 9.0-12.7 L Final           Absolute nRBC  <0.01 k/uL <0.01  Final       Basic Metabolic Panl       Glucose  104 mg/dL 74-99 H Final      The American Diabetes Association (ADA) provides guidance for cutoff values for fasting glucose and random glucose. The ADA defines fasting as no caloric intake for at least 8 hours. Fasting plasma glucose results between 100 to 125 mg/dL indicate increased risk for diabetes (prediabetes).  Fasting plasma glucose results greater than or equal to 126 mg/dL meet the criteria for diagnosis of diabetes. In the absence of unequivocal hyperglycemia, results should be confirmed by repeat testing. In a patient with classic symptoms of hyperglycemia or hyperglycemic crisis, random plasma glucose results greater than or equal to 200 mg/dL meet the criteria for diagnosis of diabetes.  Reference: Standards of Medical Care in Diabetes 2016, American Diabetes Association. Diabetes Care. 2016.39(Suppl 1).       BUN  18 mg/dL 9-24  Final           Creatinine  1.33 mg/dL 0.73-1.22 H Final           Sodium  137 mmol/L 136-144  Final           Potassium  3.6 mmol/L 3.7-5.1 L Final           Chloride  104 mmol/L   Final           CO2  21 mmol/L 22-30 L Final           Anion Gap  12 mmol/L 9-18  Final           Calcium, Total  9.3 mg/dL 8.5-10.2  Final           Estimated Glomerular Filtration Rate  53 mL/min/1.73 meters squared >=60 L Final      Estimated Glomerular Filtration Rate (eGFR) is calculated using the 2021 CKD-EPI creatinine equation. This equation utilizes serum creatinine, sex, and age as parameters. The creatinine assay has traceable calibration to isotope dilution-mass spectrometry. Refer to KDIGO guidelines for clinical interpretation. In patients with unstable renal function, e.g. those with acute kidney injury, the eGFR may not  accurately reflect actual GFR.    Assessment/Plan  #Physical deconditioning   - continue PT/OT  #UTI  - continue Augmentin, stop date 5/12/23  - continue monitor weekly cbc   - afebrile and no leukocytosis   #Anemia; s/p blood transfusion, current hgb 10 today  #history of peptic ulcer disease s/p gastric ulcer perforation repair with omental patching which was complicated by intra-abdominal abscesses   - No source of active bleeding  - continue Iron pill  - continue monitor cbc weekly  - Gastroenterology was consulted and he underwent an endoscopy which showed a stable ulcer but no bleed. Recommendation is to continue PPI twice a day for life  #hypertension; continue monitor BP   #chronic kidney disease stage III  - continue PO fluid  - avoid nephrotoxic   - renal med   - continue monitor BMP weekly   #Depression  - continue with Citalopram   #peptic ulcer   - Continue Protonix 40 mg PO BID life long   #Malnutrition  - continue regular diet  #HLD  - c/w Atorvastatin 10 mg PO daily  #Med reviewed  #Lab examined  Problem List Items Addressed This Visit       Anemia    Fatigue    Intra-abdominal abscess (CMS/HCC)    Gastrocutaneous fistula    Elevated WBC count    CINDI (acute kidney injury) (CMS/HCC)    Physical deconditioning    Recurrent major depressive disorder, in partial remission (CMS/HCC)    Abdominal pain    Severe protein-calorie malnutrition (CMS/HCC)    SOB (shortness of breath)    Pain of left lower extremity    Acute cystitis without hematuria - Primary    Peptic ulcer disease with hemorrhage   PLAN:Reviewed orders, medications, records, and pertinent labs/x-rays from   hospital. Monitor VS, BS, O2, etc as per protocol. See written orders. PT/OT   will evaluate and start appropriate rehabilitation program. Reviewed and signed   off orders, medications,Labs, x-rays, and current diagnoses. Reviewed and   updated CPR status and any changes in Advanced directives. Continue Rehab Will   see 1-2 times weekly  for next 30 days then reassess. Will always see at   resident, family , or nursing request. Discharge Planning   Time   Time Spent With Patient: 45 minutes of which greater than 50 percent was spent   counseling and or coordinating care.      Electronically Signed By: Elver Nuno MD   5/15/23  6:02 AM   Comment: Will register patient in [employer] today. Will start 40mg isotretinoin daily after labs are reviewed. Detail Level: Simple Render Risk Assessment In Note?: no

## 2023-05-15 ENCOUNTER — NURSING HOME VISIT (OUTPATIENT)
Dept: POST ACUTE CARE | Facility: EXTERNAL LOCATION | Age: 83
End: 2023-05-15
Payer: MEDICARE

## 2023-05-15 DIAGNOSIS — R53.81 PHYSICAL DECONDITIONING: Primary | ICD-10-CM

## 2023-05-15 DIAGNOSIS — K27.9 PEPTIC ULCER: ICD-10-CM

## 2023-05-15 DIAGNOSIS — D64.9 ANEMIA, UNSPECIFIED TYPE: ICD-10-CM

## 2023-05-15 DIAGNOSIS — N39.0 URINARY TRACT INFECTION WITHOUT HEMATURIA, SITE UNSPECIFIED: ICD-10-CM

## 2023-05-15 PROBLEM — K27.4 PEPTIC ULCER DISEASE WITH HEMORRHAGE: Status: ACTIVE | Noted: 2023-05-15

## 2023-05-15 PROBLEM — N30.00 ACUTE CYSTITIS WITHOUT HEMATURIA: Status: ACTIVE | Noted: 2023-05-15

## 2023-05-15 PROCEDURE — 99308 SBSQ NF CARE LOW MDM 20: CPT | Performed by: NURSE PRACTITIONER

## 2023-05-15 ASSESSMENT — ENCOUNTER SYMPTOMS
NAUSEA: 0
COUGH: 0
BLOOD IN STOOL: 0
LIGHT-HEADEDNESS: 0
ABDOMINAL DISTENTION: 0
WHEEZING: 0
CONSTIPATION: 0
AGITATION: 0
FATIGUE: 0
FEVER: 0
CONFUSION: 0
DIARRHEA: 0
MUSCULOSKELETAL NEGATIVE: 1
APPETITE CHANGE: 0
CHILLS: 0
VOMITING: 0
DYSURIA: 0
ABDOMINAL PAIN: 0
PALPITATIONS: 0
DIZZINESS: 0
SHORTNESS OF BREATH: 0

## 2023-05-15 NOTE — PROGRESS NOTES
Admission H and P    Subjective   Patient ID: Dakota Pugh is a 83 y.o. male who is acute skilled care and presents for initial visit for skilled nursing.    HPI   A 83 year old Male with a past medical history significant for hypertension, chronic kidney disease stage III,  history of peptic ulcer disease s/p gastric ulcer perforation repair with omental patching which was complicated by intra-abdominal abscesses for which he was on antibiotics and a drain was successfully treated removed sent to rehab facility when he did very well with therapy and discharged home on 4/24/23. Per patient's wife patient was sent to ER 5/5/23 due to abnormal blood work; leukocytosis; found to have UTI and anemia hgb 6.8 received blood transfusion. He also received IV fluids CINDI. UTI initially treatment with IV Zosyn and then switched to Augmentin on discharge. Gastroenterology was consulted and he underwent an endoscopy which showed a stable ulcer but no bleed.Recommendation is to continue PPI twice a day for life. Discharged to  for rehab  Has been tolerating po but poor intake  Review of Systems   Constitutional:  Negative for appetite change, chills, fatigue and fever.   Respiratory:  Negative for cough, shortness of breath and wheezing.    Cardiovascular:  Negative for chest pain, palpitations and leg swelling.   Gastrointestinal:  Negative for abdominal distention, abdominal pain, blood in stool, constipation, diarrhea, nausea and vomiting.   Genitourinary:  Negative for dysuria.   Musculoskeletal: Negative.    Neurological:  Negative for dizziness, syncope and light-headedness.   Psychiatric/Behavioral:  Negative for agitation, behavioral problems and confusion.    Objective   Weight: 166.0 4/25/2023 07:18  Blood Pressure: 131 /  62 5/11/2023 01:21  Temperature: 98.4 5/11/2023 01:21  Pulse: 61 5/11/2023 01:21  Respirations: 18 5/11/2023 01:21  Blood Sugar: 92.0 4/3/2023 12:46  O2 sats: 95.0  % 5/11/2023 01:21  Height: 70.0 3/2/2023 17:07  Pain Level: 0 4/25/2023 07:55    Physical Exam  Constitutional:       General: He is not in acute distress.     Appearance: Normal appearance. He is normal weight. He is not ill-appearing or toxic-appearing.   HENT:      Head: Normocephalic and atraumatic.      Nose: Nose normal.      Mouth/Throat:      Mouth: Mucous membranes are moist.   Eyes:      General: No scleral icterus.     Extraocular Movements: Extraocular movements intact.      Conjunctiva/sclera: Conjunctivae normal.   Cardiovascular:      Rate and Rhythm: Normal rate and regular rhythm.      Pulses: Normal pulses.      Heart sounds: Normal heart sounds. No murmur heard.  Pulmonary:      Effort: Pulmonary effort is normal. No respiratory distress.      Breath sounds: Normal breath sounds. No wheezing, rhonchi or rales.   Abdominal:      General: Abdomen is flat. Bowel sounds are normal. There is no distension.      Palpations: Abdomen is soft.      Tenderness: There is abdominal tenderness. There is rebound. There is no guarding.   Musculoskeletal:         General: No swelling, tenderness, deformity or signs of injury. Normal range of motion.      Cervical back: Normal range of motion and neck supple. No tenderness.      Right lower leg: No edema.      Left lower leg: No edema.   Skin:     General: Skin is warm.      Coloration: Skin is not jaundiced.   Neurological:      Mental Status: He is alert and oriented to person, place, and time. Mental status is at baseline.   Psychiatric:         Mood and Affect: Mood normal.         Behavior: Behavior normal.     Lab result on 5/11/23  CBC       White Blood Cell Count  7.37 k/uL 3.70-11.00  Final           RBC  3.61 m/uL 4.20-6.00 L Final           Hemoglobin  10.1 g/dL 13.0-17.0 L Final           Hematocrit  33.0 % 39.0-51.0 L Final           MCV  91.4 fL 80.0-100.0  Final           MCH  28.0 pg 26.0-34.0  Final           MCHC  30.6 g/dL 30.5-36.0  Final            RDW-CV  15.8 % 11.5-15.0 H Final           Platelet Count  461 k/uL 150-400 H Final           MPV  8.8 fL 9.0-12.7 L Final           Absolute nRBC  <0.01 k/uL <0.01  Final       Basic Metabolic Panl       Glucose  104 mg/dL 74-99 H Final      The American Diabetes Association (ADA) provides guidance for cutoff values for fasting glucose and random glucose. The ADA defines fasting as no caloric intake for at least 8 hours. Fasting plasma glucose results between 100 to 125 mg/dL indicate increased risk for diabetes (prediabetes).  Fasting plasma glucose results greater than or equal to 126 mg/dL meet the criteria for diagnosis of diabetes. In the absence of unequivocal hyperglycemia, results should be confirmed by repeat testing. In a patient with classic symptoms of hyperglycemia or hyperglycemic crisis, random plasma glucose results greater than or equal to 200 mg/dL meet the criteria for diagnosis of diabetes.  Reference: Standards of Medical Care in Diabetes 2016, American Diabetes Association. Diabetes Care. 2016.39(Suppl 1).       BUN  18 mg/dL 9-24  Final           Creatinine  1.33 mg/dL 0.73-1.22 H Final           Sodium  137 mmol/L 136-144  Final           Potassium  3.6 mmol/L 3.7-5.1 L Final           Chloride  104 mmol/L   Final           CO2  21 mmol/L 22-30 L Final           Anion Gap  12 mmol/L 9-18  Final           Calcium, Total  9.3 mg/dL 8.5-10.2  Final           Estimated Glomerular Filtration Rate  53 mL/min/1.73 meters squared >=60 L Final      Estimated Glomerular Filtration Rate (eGFR) is calculated using the 2021 CKD-EPI creatinine equation. This equation utilizes serum creatinine, sex, and age as parameters. The creatinine assay has traceable calibration to isotope dilution-mass spectrometry. Refer to KDIGO guidelines for clinical interpretation. In patients with unstable renal function, e.g. those with acute kidney injury, the eGFR may not accurately reflect actual  GFR.    Assessment/Plan   #Physical deconditioning   - continue PT/OT  #UTI  - continue Augmentin, stop date 5/12/23  - continue monitor weekly cbc   - afebrile and no leukocytosis   #Anemia; s/p blood transfusion, current hgb 10 today  #history of peptic ulcer disease s/p gastric ulcer perforation repair with omental patching which was complicated by intra-abdominal abscesses   - No source of active bleeding  - continue Iron pill  - continue monitor cbc weekly  - Gastroenterology was consulted and he underwent an endoscopy which showed a stable ulcer but no bleed. Recommendation is to continue PPI twice a day for life  #hypertension; continue monitor BP   #chronic kidney disease stage III  - continue PO fluid  - avoid nephrotoxic   - renal med   - continue monitor BMP weekly   #Depression  - continue with Citalopram   #peptic ulcer   - Continue Protonix 40 mg PO BID life long   #Malnutrition  - continue regular diet  #HLD  - c/w Atorvastatin 10 mg PO daily  #Med reviewed  #Lab examined  Problem List Items Addressed This Visit       Anemia    Fatigue    Intra-abdominal abscess (CMS/HCC)    Gastrocutaneous fistula    Elevated WBC count    CINDI (acute kidney injury) (CMS/HCC)    Physical deconditioning    Recurrent major depressive disorder, in partial remission (CMS/HCC)    Abdominal pain    Severe protein-calorie malnutrition (CMS/HCC)    SOB (shortness of breath)    Pain of left lower extremity    Acute cystitis without hematuria - Primary    Peptic ulcer disease with hemorrhage   PLAN:Reviewed orders, medications, records, and pertinent labs/x-rays from   hospital. Monitor VS, BS, O2, etc as per protocol. See written orders. PT/OT   will evaluate and start appropriate rehabilitation program. Reviewed and signed   off orders, medications,Labs, x-rays, and current diagnoses. Reviewed and   updated CPR status and any changes in Advanced directives. Continue Rehab Will   see 1-2 times weekly for next 30 days then  reassess. Will always see at   resident, family , or nursing request. Discharge Planning   Time   Time Spent With Patient: 45 minutes of which greater than 50 percent was spent   counseling and or coordinating care.

## 2023-05-15 NOTE — LETTER
Patient: Dakota Pugh  : 1940    Encounter Date: 05/15/2023    Subjective  Patient ID: Dakota Pugh is a 83 y.o. male who is acute skilled care and presents for follow up multiple medical problems.     HPI   A 83 year old Male with a past medical history significant for hypertension, chronic kidney disease stage III,  history of peptic ulcer disease s/p gastric ulcer perforation repair with omental patching which was complicated by intra-abdominal abscesses. He was discharged home on 23. He was hospitalization on 23 for UTI and anemia s/p blood transfusion. Discharged to  for rehab. See Ros.   Review of Systems   Constitutional:  Negative for appetite change, chills, fatigue and fever.   Respiratory:  Negative for cough, shortness of breath and wheezing.    Cardiovascular:  Negative for chest pain, palpitations and leg swelling.   Gastrointestinal:  Negative for abdominal distention, abdominal pain, blood in stool, constipation, diarrhea, nausea and vomiting.   Genitourinary:  Negative for dysuria.   Musculoskeletal: Negative.    Neurological:  Negative for dizziness, syncope and light-headedness.   Psychiatric/Behavioral:  Negative for agitation, behavioral problems and confusion.    Objective  Vitals: T 97.3 - 5/15/2023 14:31 Route: Temporal Artery  Pain: Indicators of pain: None.  T 98.0 - 2023 10:30 Route: Oral  /62 - 2023 10:30 Position: Lying l/arm  P 61 - 2023 10:30 Pulse Type: Regular  R 18.0 - 2023 10:30  O2 91.0 % - 2023 10:30 Method: Room Air  LAB ON 23  CBC       White Blood Cell Count  7.37 k/uL 3.70-11.00  Final           RBC  3.61 m/uL 4.20-6.00 L Final           Hemoglobin  10.1 g/dL 13.0-17.0 L Final           Hematocrit  33.0 % 39.0-51.0 L Final           MCV  91.4 fL 80.0-100.0  Final           MCH  28.0 pg 26.0-34.0  Final           MCHC  30.6 g/dL 30.5-36.0  Final           RDW-CV  15.8 % 11.5-15.0 H Final           Platelet  Count  461 k/uL 150-400 H Final           MPV  8.8 fL 9.0-12.7 L Final           Absolute nRBC  <0.01 k/uL <0.01  Final       Basic Metabolic Panl       Glucose  104 mg/dL 74-99 H Final      The American Diabetes Association (ADA) provides guidance for cutoff values for fasting glucose and random glucose. The ADA defines fasting as no caloric intake for at least 8 hours. Fasting plasma glucose results between 100 to 125 mg/dL indicate increased risk for diabetes (prediabetes).  Fasting plasma glucose results greater than or equal to 126 mg/dL meet the criteria for diagnosis of diabetes. In the absence of unequivocal hyperglycemia, results should be confirmed by repeat testing. In a patient with classic symptoms of hyperglycemia or hyperglycemic crisis, random plasma glucose results greater than or equal to 200 mg/dL meet the criteria for diagnosis of diabetes.  Reference: Standards of Medical Care in Diabetes 2016, American Diabetes Association. Diabetes Care. 2016.39(Suppl 1).       BUN  18 mg/dL 9-24  Final           Creatinine  1.33 mg/dL 0.73-1.22 H Final           Sodium  137 mmol/L 136-144  Final           Potassium  3.6 mmol/L 3.7-5.1 L Final           Chloride  104 mmol/L   Final           CO2  21 mmol/L 22-30 L Final           Anion Gap  12 mmol/L 9-18  Final           Calcium, Total  9.3 mg/dL 8.5-10.2  Final           Estimated Glomerular Filtration Rate  53 mL/min/1.73 meters squared >=60 L Final      Estimated Glomerular Filtration Rate (eGFR) is calculated using the 2021 CKD-EPI creatinine equation. This equation utilizes serum creatinine, sex, and age as parameters. The creatinine assay has traceable calibration to isotope dilution-mass spectrometry. Refer to KDIGO guidelines for clinical interpretation. In patients with unstable renal function, e.g. those with acute kidney injury, the eGFR may not accurately reflect actual GFR.  Physical Exam  Constitutional:       General: He is not in acute  distress.     Appearance: Normal appearance.   HENT:      Mouth/Throat:      Mouth: Mucous membranes are moist.   Eyes:      General: No scleral icterus.     Conjunctiva/sclera: Conjunctivae normal.   Cardiovascular:      Rate and Rhythm: Normal rate and regular rhythm.      Heart sounds: Normal heart sounds. No murmur heard.  Pulmonary:      Effort: Pulmonary effort is normal. No respiratory distress.      Breath sounds: Normal breath sounds. No wheezing, rhonchi or rales.   Abdominal:      General: Abdomen is flat. Bowel sounds are normal. There is no distension.      Palpations: Abdomen is soft.      Tenderness: There is no abdominal tenderness. There is no guarding.   Musculoskeletal:         General: Normal range of motion.      Cervical back: No tenderness.      Right lower leg: No edema.      Left lower leg: No edema.   Skin:     General: Skin is warm.      Coloration: Skin is not jaundiced.   Neurological:      Mental Status: He is alert and oriented to person, place, and time. Mental status is at baseline.   Psychiatric:         Mood and Affect: Mood normal.         Behavior: Behavior normal.     Assessment/Plan  #Physical deconditioning   - continue PT/OT  #UTI  - complete Augmentin on 5/12/23  - continue monitor weekly cbc   - afebrile and no leukocytosis   #Anemia; s/p blood transfusion, hgb stable, no source of active bleeding  #history of peptic ulcer disease s/p gastric ulcer perforation repair with omental patching which was complicated by intra-abdominal abscesses   - No source of active bleeding  - continue Iron pill  - continue monitor cbc weekly  - Gastroenterology was consulted and he underwent an endoscopy which showed a stable ulcer but no bleed. Recommendation is to continue PPI twice a day for life  #Med reviewed    Chronic medical problems   #hypertension; continue monitor BP   #chronic kidney disease stage III  - continue PO fluid  - avoid nephrotoxic   - renal med   - continue monitor BMP  weekly   #Depression  - continue with Citalopram   #peptic ulcer   - Continue Protonix 40 mg PO BID life long   #Malnutrition  - continue regular diet  #HLD  - c/w Atorvastatin 10 mg PO daily    - Reviewed orders, medications, records, and pertinent labs/x-rays from [facility]. Reviewed VS, BS, O2, etc as per protocol. Reviewed and signed off orders, medications, Labs, x-rays, and current diagnoses in [facility]  -Obtained history  -Performing physical examination  -Counseling and educating patient's wife for POC as above and updated given in detail  -Ordering medications, lab   -Documenting clinical information in the [facility]   -Care coordinating with nursing staff         Electronically Signed By: TEAGAN Chapman   5/18/23 11:24 PM

## 2023-05-16 ASSESSMENT — ENCOUNTER SYMPTOMS
DYSURIA: 0
AGITATION: 0
FEVER: 0
BLOOD IN STOOL: 0
CHILLS: 0
WHEEZING: 0
ABDOMINAL PAIN: 0
PALPITATIONS: 0
MUSCULOSKELETAL NEGATIVE: 1
COUGH: 0
SHORTNESS OF BREATH: 0
VOMITING: 0
LIGHT-HEADEDNESS: 0
DIZZINESS: 0
DIARRHEA: 0
ABDOMINAL DISTENTION: 0
FATIGUE: 0
NAUSEA: 0
CONFUSION: 0
APPETITE CHANGE: 0
CONSTIPATION: 0

## 2023-05-16 NOTE — PROGRESS NOTES
Subjective   Patient ID: Dakota Pugh is a 83 y.o. male who is acute skilled care and presents for follow up multiple medical problems.     HPI   A 83 year old Male with a past medical history significant for hypertension, chronic kidney disease stage III,  history of peptic ulcer disease s/p gastric ulcer perforation repair with omental patching which was complicated by intra-abdominal abscesses. He was discharged home on 4/24/23. He was hospitalization on 5/5/23 for UTI and anemia s/p blood transfusion. Discharged to  for rehab. See Ros.   Review of Systems   Constitutional:  Negative for appetite change, chills, fatigue and fever.   Respiratory:  Negative for cough, shortness of breath and wheezing.    Cardiovascular:  Negative for chest pain, palpitations and leg swelling.   Gastrointestinal:  Negative for abdominal distention, abdominal pain, blood in stool, constipation, diarrhea, nausea and vomiting.   Genitourinary:  Negative for dysuria.   Musculoskeletal: Negative.    Neurological:  Negative for dizziness, syncope and light-headedness.   Psychiatric/Behavioral:  Negative for agitation, behavioral problems and confusion.    Objective   Vitals: T 97.3 - 5/15/2023 14:31 Route: Temporal Artery  Pain: Indicators of pain: None.  T 98.0 - 5/14/2023 10:30 Route: Oral  /62 - 5/14/2023 10:30 Position: Lying l/arm  P 61 - 5/14/2023 10:30 Pulse Type: Regular  R 18.0 - 5/14/2023 10:30  O2 91.0 % - 5/14/2023 10:30 Method: Room Air  LAB ON 5/11/23  CBC       White Blood Cell Count  7.37 k/uL 3.70-11.00  Final           RBC  3.61 m/uL 4.20-6.00 L Final           Hemoglobin  10.1 g/dL 13.0-17.0 L Final           Hematocrit  33.0 % 39.0-51.0 L Final           MCV  91.4 fL 80.0-100.0  Final           MCH  28.0 pg 26.0-34.0  Final           MCHC  30.6 g/dL 30.5-36.0  Final           RDW-CV  15.8 % 11.5-15.0 H Final           Platelet Count  461 k/uL 150-400 H Final           MPV  8.8 fL 9.0-12.7 L Final            Absolute nRBC  <0.01 k/uL <0.01  Final       Basic Metabolic Panl       Glucose  104 mg/dL 74-99 H Final      The American Diabetes Association (ADA) provides guidance for cutoff values for fasting glucose and random glucose. The ADA defines fasting as no caloric intake for at least 8 hours. Fasting plasma glucose results between 100 to 125 mg/dL indicate increased risk for diabetes (prediabetes).  Fasting plasma glucose results greater than or equal to 126 mg/dL meet the criteria for diagnosis of diabetes. In the absence of unequivocal hyperglycemia, results should be confirmed by repeat testing. In a patient with classic symptoms of hyperglycemia or hyperglycemic crisis, random plasma glucose results greater than or equal to 200 mg/dL meet the criteria for diagnosis of diabetes.  Reference: Standards of Medical Care in Diabetes 2016, American Diabetes Association. Diabetes Care. 2016.39(Suppl 1).       BUN  18 mg/dL 9-24  Final           Creatinine  1.33 mg/dL 0.73-1.22 H Final           Sodium  137 mmol/L 136-144  Final           Potassium  3.6 mmol/L 3.7-5.1 L Final           Chloride  104 mmol/L   Final           CO2  21 mmol/L 22-30 L Final           Anion Gap  12 mmol/L 9-18  Final           Calcium, Total  9.3 mg/dL 8.5-10.2  Final           Estimated Glomerular Filtration Rate  53 mL/min/1.73 meters squared >=60 L Final      Estimated Glomerular Filtration Rate (eGFR) is calculated using the 2021 CKD-EPI creatinine equation. This equation utilizes serum creatinine, sex, and age as parameters. The creatinine assay has traceable calibration to isotope dilution-mass spectrometry. Refer to KDIGO guidelines for clinical interpretation. In patients with unstable renal function, e.g. those with acute kidney injury, the eGFR may not accurately reflect actual GFR.  Physical Exam  Constitutional:       General: He is not in acute distress.     Appearance: Normal appearance.   HENT:      Mouth/Throat:       Mouth: Mucous membranes are moist.   Eyes:      General: No scleral icterus.     Conjunctiva/sclera: Conjunctivae normal.   Cardiovascular:      Rate and Rhythm: Normal rate and regular rhythm.      Heart sounds: Normal heart sounds. No murmur heard.  Pulmonary:      Effort: Pulmonary effort is normal. No respiratory distress.      Breath sounds: Normal breath sounds. No wheezing, rhonchi or rales.   Abdominal:      General: Abdomen is flat. Bowel sounds are normal. There is no distension.      Palpations: Abdomen is soft.      Tenderness: There is no abdominal tenderness. There is no guarding.   Musculoskeletal:         General: Normal range of motion.      Cervical back: No tenderness.      Right lower leg: No edema.      Left lower leg: No edema.   Skin:     General: Skin is warm.      Coloration: Skin is not jaundiced.   Neurological:      Mental Status: He is alert and oriented to person, place, and time. Mental status is at baseline.   Psychiatric:         Mood and Affect: Mood normal.         Behavior: Behavior normal.     Assessment/Plan   #Physical deconditioning   - continue PT/OT  #UTI  - complete Augmentin on 5/12/23  - continue monitor weekly cbc   - afebrile and no leukocytosis   #Anemia; s/p blood transfusion, hgb stable, no source of active bleeding  #history of peptic ulcer disease s/p gastric ulcer perforation repair with omental patching which was complicated by intra-abdominal abscesses   - No source of active bleeding  - continue Iron pill  - continue monitor cbc weekly  - Gastroenterology was consulted and he underwent an endoscopy which showed a stable ulcer but no bleed. Recommendation is to continue PPI twice a day for life  #Med reviewed    Chronic medical problems   #hypertension; continue monitor BP   #chronic kidney disease stage III  - continue PO fluid  - avoid nephrotoxic   - renal med   - continue monitor BMP weekly   #Depression  - continue with Citalopram   #peptic ulcer   -  Continue Protonix 40 mg PO BID life long   #Malnutrition  - continue regular diet  #HLD  - c/w Atorvastatin 10 mg PO daily    - Reviewed orders, medications, records, and pertinent labs/x-rays from [facility]. Reviewed VS, BS, O2, etc as per protocol. Reviewed and signed off orders, medications, Labs, x-rays, and current diagnoses in [facility]  -Obtained history  -Performing physical examination  -Counseling and educating patient's wife for POC as above and updated given in detail  -Ordering medications, lab   -Documenting clinical information in the [facility]   -Care coordinating with nursing staff

## 2023-05-17 ENCOUNTER — NURSING HOME VISIT (OUTPATIENT)
Dept: POST ACUTE CARE | Facility: EXTERNAL LOCATION | Age: 83
End: 2023-05-17
Payer: MEDICARE

## 2023-05-17 DIAGNOSIS — E78.41 ELEVATED LIPOPROTEIN(A): ICD-10-CM

## 2023-05-17 DIAGNOSIS — K27.4 PEPTIC ULCER DISEASE WITH HEMORRHAGE: ICD-10-CM

## 2023-05-17 DIAGNOSIS — K65.1 INTRA-ABDOMINAL ABSCESS (MULTI): ICD-10-CM

## 2023-05-17 DIAGNOSIS — M54.16 LUMBAR NEURITIS: ICD-10-CM

## 2023-05-17 DIAGNOSIS — N30.00 ACUTE CYSTITIS WITHOUT HEMATURIA: ICD-10-CM

## 2023-05-17 DIAGNOSIS — I95.1 ORTHOSTATIC HYPOTENSION: ICD-10-CM

## 2023-05-17 DIAGNOSIS — N18.31 ANEMIA DUE TO STAGE 3A CHRONIC KIDNEY DISEASE (MULTI): ICD-10-CM

## 2023-05-17 DIAGNOSIS — F33.41 RECURRENT MAJOR DEPRESSIVE DISORDER, IN PARTIAL REMISSION (CMS-HCC): ICD-10-CM

## 2023-05-17 DIAGNOSIS — N17.9 AKI (ACUTE KIDNEY INJURY) (CMS-HCC): ICD-10-CM

## 2023-05-17 DIAGNOSIS — R53.81 PHYSICAL DECONDITIONING: ICD-10-CM

## 2023-05-17 DIAGNOSIS — D63.1 ANEMIA DUE TO STAGE 3A CHRONIC KIDNEY DISEASE (MULTI): ICD-10-CM

## 2023-05-17 DIAGNOSIS — E43 SEVERE PROTEIN-CALORIE MALNUTRITION (MULTI): Primary | ICD-10-CM

## 2023-05-17 DIAGNOSIS — E87.1 HYPONATREMIA: ICD-10-CM

## 2023-05-17 PROCEDURE — 99309 SBSQ NF CARE MODERATE MDM 30: CPT | Performed by: FAMILY MEDICINE

## 2023-05-18 ENCOUNTER — NURSING HOME VISIT (OUTPATIENT)
Dept: POST ACUTE CARE | Facility: EXTERNAL LOCATION | Age: 83
End: 2023-05-18
Payer: MEDICARE

## 2023-05-18 DIAGNOSIS — Z01.89 LABORATORY EXAMINATION: ICD-10-CM

## 2023-05-18 DIAGNOSIS — E78.5 HYPERLIPIDEMIA, UNSPECIFIED HYPERLIPIDEMIA TYPE: ICD-10-CM

## 2023-05-18 DIAGNOSIS — F32.A DEPRESSION, UNSPECIFIED DEPRESSION TYPE: ICD-10-CM

## 2023-05-18 DIAGNOSIS — Z79.899 ENCOUNTER FOR MEDICATION REVIEW: ICD-10-CM

## 2023-05-18 DIAGNOSIS — N39.0 URINARY TRACT INFECTION WITHOUT HEMATURIA, SITE UNSPECIFIED: ICD-10-CM

## 2023-05-18 DIAGNOSIS — D64.9 ANEMIA, UNSPECIFIED TYPE: ICD-10-CM

## 2023-05-18 DIAGNOSIS — K21.9 GASTROESOPHAGEAL REFLUX DISEASE, UNSPECIFIED WHETHER ESOPHAGITIS PRESENT: ICD-10-CM

## 2023-05-18 DIAGNOSIS — K27.4 PEPTIC ULCER DISEASE WITH HEMORRHAGE: ICD-10-CM

## 2023-05-18 DIAGNOSIS — R53.81 PHYSICAL DECONDITIONING: Primary | ICD-10-CM

## 2023-05-18 DIAGNOSIS — E46 PROTEIN-CALORIE MALNUTRITION, UNSPECIFIED SEVERITY (MULTI): ICD-10-CM

## 2023-05-18 PROCEDURE — 99316 NF DSCHRG MGMT 30 MIN+: CPT | Performed by: NURSE PRACTITIONER

## 2023-05-18 RX ORDER — PANTOPRAZOLE SODIUM 40 MG/1
40 TABLET, DELAYED RELEASE ORAL 2 TIMES DAILY
Qty: 60 TABLET | Refills: 0 | Status: SHIPPED | OUTPATIENT
Start: 2023-05-18 | End: 2023-06-07 | Stop reason: SDUPTHER

## 2023-05-18 RX ORDER — CITALOPRAM 10 MG/1
10 TABLET ORAL DAILY
Qty: 30 TABLET | Refills: 0 | Status: SHIPPED | OUTPATIENT
Start: 2023-05-18 | End: 2023-07-31 | Stop reason: ALTCHOICE

## 2023-05-18 NOTE — LETTER
Patient: Dakota Pugh  : 1940    Encounter Date: 2023    Subjective  Patient ID: Dakota Pugh is a 83 y.o. male who is acute skilled care and presents for follow up multiple medical problems and discharge planing issues.    HPI   A 83 year old Male with a past medical history significant for hypertension, chronic kidney disease stage III,  history of peptic ulcer disease s/p gastric ulcer perforation repair with omental patching which was complicated by intra-abdominal abscesses. He was discharged home on 23. He was hospitalization on 23 for UTI and anemia s/p blood transfusion. Discharged to  for rehab. Patient had uncomplicated rehab course. He is doing better with therapy, Tolerate PO diet. Pain controlled. Denies chest pain, SOB, F/C/S/N/V, constipation.    Follow up urine result; UA and urine c/s obtained and sent prior discharge. Lab result printed and gave to patient's wife. Prescription called as patient's wife requested.   Objective  Weight: 160.6 2023 02:54  Blood Pressure: 105 /  69 2023 00:34  Temperature: 97.9 2023 00:34  Pulse: 65 2023 00:34  Respirations: 18 2023 00:34  Blood Sugar: 92.0 4/3/2023 12:46  O2 sats: 94.0 % 2023 00:34  Height: 70.0 3/2/2023 17:07  Pain Level: 0 2023 07:55  LAB ON 23   CBC       White Blood Cell Count  7.67 k/uL 3.70-11.00  Final           RBC  3.75 m/uL 4.20-6.00 L Final           Hemoglobin  10.7 g/dL 13.0-17.0 L Final           Hematocrit  34.1 % 39.0-51.0 L Final           MCV  90.9 fL 80.0-100.0  Final           MCH  28.5 pg 26.0-34.0  Final           MCHC  31.4 g/dL 30.5-36.0  Final           RDW-CV  16.2 % 11.5-15.0 H Final           Platelet Count  345 k/uL 150-400  Final           MPV  9.0 fL 9.0-12.7  Final           Absolute nRBC  <0.01 k/uL <0.01  Final       Basic Metabolic Panl       Glucose  75 mg/dL 74-99  Final      The American Diabetes Association (ADA) provides guidance for cutoff values  for fasting glucose and random glucose. The ADA defines fasting as no caloric intake for at least 8 hours. Fasting plasma glucose results between 100 to 125 mg/dL indicate increased risk for diabetes (prediabetes).  Fasting plasma glucose results greater than or equal to 126 mg/dL meet the criteria for diagnosis of diabetes. In the absence of unequivocal hyperglycemia, results should be confirmed by repeat testing. In a patient with classic symptoms of hyperglycemia or hyperglycemic crisis, random plasma glucose results greater than or equal to 200 mg/dL meet the criteria for diagnosis of diabetes.  Reference: Standards of Medical Care in Diabetes 2016, American Diabetes Association. Diabetes Care. 2016.39(Suppl 1).       BUN  28 mg/dL 9-24 H Final           Creatinine  1.34 mg/dL 0.73-1.22 H Final           Sodium  134 mmol/L 136-144 L Final           Potassium  4.8 mmol/L 3.7-5.1  Final           Chloride  95 mmol/L  L Final           CO2  23 mmol/L 22-30  Final           Anion Gap  16 mmol/L 9-18  Final           Calcium, Total  9.9 mg/dL 8.5-10.2  Final           Estimated Glomerular Filtration Rate  53 mL/min/1.73 meters squared >=60 L Final      Estimated Glomerular Filtration Rate (eGFR) is calculated using the 2021 CKD-EPI creatinine equation. This equation utilizes serum creatinine, sex, and age as parameters. The creatinine assay has traceable calibration to isotope dilution-mass spectrometry. Refer to KDIGO guidelines for clinical interpretation. In patients with unstable renal function, e.g. those with acute kidney injury, the eGFR may not accurately reflect actual GFR.  Physical exam  Constitutional: awake, afebrile, not in acute distress  Eyes: clear sclera  ENMT: mucous membranes moist  Head/Neck: neck supple, trachea midline, no JVD  Respiratory/Thorax: CTAB, no rales/rhonchi/wheezing  Cardiovascular: RRR, no rubs/murmurs/gallops  Gastrointestinal: +BS x4, soft, nt/nd/ng/nr  Musculoskeletal:  ROM WNL, normal strength   Extremities: no edema, no cellulitis  Neurological: A&O x3, attention intact, speech fluent   Psychological: Appropriate mood and behavior  Skin: warm and dry, intact  Assessment/Plan  #Physical deconditioning; DOING BETTER   - continue PT/OT  #UTI  - completed Augmentin, stop date 5/12/23  - continue monitor weekly cbc   - afebrile and no leukocytosis   - Repeat UA and urine c/s   #Anemia; s/p blood transfusion, current hgb 10.7 today  #history of peptic ulcer disease s/p gastric ulcer perforation repair with omental patching which was complicated by intra-abdominal abscesses   #GERD  - No source of active bleeding  - continue Iron pill  - continue monitor cbc weekly  - Gastroenterology was consulted and he underwent an endoscopy which showed a stable ulcer but no bleed. Recommendation is to continue PPI twice a day for life  - C/W Protonix 40 mg PO BID   #Depression  - continue with Citalopram   #Malnutrition  - continue regular diet  #HLD  - c/w Atorvastatin 10 mg PO daily  #Med reviewed  #Lab examined  #Discharge planing issues   - Reviewed medications and educated patient for reason of taking medication. Prescriptions were made as patient requested.  -  Follow-up with specialist and also follow-up with community providers/PCP.  - Discussed with SW; patient requires a home visiting RN for monitoring blood sugar, finally patient would benefit from home PT/OT to improve muscle strengthening & to establish a home exercise program due to deconditioning   - Need follow up for urine result     Time spending for discharge > 30 minutes   -Reviewed orders, medications, records, and pertinent labs/x-rays from [facility]. Reviewed VS, BS, O2, etc as per protocol. Reviewed and signed off orders, medications, Labs, x-rays, and current diagnoses in [facility]  -Obtained history  -Performing physical examination  -Counseling and educating patient for pain management   -Ordering medications, lab   -Documenting  clinical information in the [facility]   -Care coordinating with nursing staff         Electronically Signed By: TEAGAN Chapman   5/19/23 12:00 AM

## 2023-05-19 NOTE — PROGRESS NOTES
Subjective   Patient ID: Dakota Pugh is a 83 y.o. male who is acute skilled care and presents for follow up multiple medical problems and discharge planing issues.    HPI   A 83 year old Male with a past medical history significant for hypertension, chronic kidney disease stage III,  history of peptic ulcer disease s/p gastric ulcer perforation repair with omental patching which was complicated by intra-abdominal abscesses. He was discharged home on 4/24/23. He was hospitalization on 5/5/23 for UTI and anemia s/p blood transfusion. Discharged to  for rehab. Patient had uncomplicated rehab course. He is doing better with therapy, Tolerate PO diet. Pain controlled. Denies chest pain, SOB, F/C/S/N/V, constipation.    Follow up urine result; UA and urine c/s obtained and sent prior discharge. Lab result printed and gave to patient's wife. Prescription called as patient's wife requested.   Objective   Weight: 160.6 5/17/2023 02:54  Blood Pressure: 105 /  69 5/18/2023 00:34  Temperature: 97.9 5/18/2023 00:34  Pulse: 65 5/18/2023 00:34  Respirations: 18 5/18/2023 00:34  Blood Sugar: 92.0 4/3/2023 12:46  O2 sats: 94.0 % 5/18/2023 00:34  Height: 70.0 3/2/2023 17:07  Pain Level: 0 4/25/2023 07:55  LAB ON 5/18/23   CBC       White Blood Cell Count  7.67 k/uL 3.70-11.00  Final           RBC  3.75 m/uL 4.20-6.00 L Final           Hemoglobin  10.7 g/dL 13.0-17.0 L Final           Hematocrit  34.1 % 39.0-51.0 L Final           MCV  90.9 fL 80.0-100.0  Final           MCH  28.5 pg 26.0-34.0  Final           MCHC  31.4 g/dL 30.5-36.0  Final           RDW-CV  16.2 % 11.5-15.0 H Final           Platelet Count  345 k/uL 150-400  Final           MPV  9.0 fL 9.0-12.7  Final           Absolute nRBC  <0.01 k/uL <0.01  Final       Basic Metabolic Panl       Glucose  75 mg/dL 74-99  Final      The American Diabetes Association (ADA) provides guidance for cutoff values for fasting glucose and random glucose. The ADA defines fasting as  no caloric intake for at least 8 hours. Fasting plasma glucose results between 100 to 125 mg/dL indicate increased risk for diabetes (prediabetes).  Fasting plasma glucose results greater than or equal to 126 mg/dL meet the criteria for diagnosis of diabetes. In the absence of unequivocal hyperglycemia, results should be confirmed by repeat testing. In a patient with classic symptoms of hyperglycemia or hyperglycemic crisis, random plasma glucose results greater than or equal to 200 mg/dL meet the criteria for diagnosis of diabetes.  Reference: Standards of Medical Care in Diabetes 2016, American Diabetes Association. Diabetes Care. 2016.39(Suppl 1).       BUN  28 mg/dL 9-24 H Final           Creatinine  1.34 mg/dL 0.73-1.22 H Final           Sodium  134 mmol/L 136-144 L Final           Potassium  4.8 mmol/L 3.7-5.1  Final           Chloride  95 mmol/L  L Final           CO2  23 mmol/L 22-30  Final           Anion Gap  16 mmol/L 9-18  Final           Calcium, Total  9.9 mg/dL 8.5-10.2  Final           Estimated Glomerular Filtration Rate  53 mL/min/1.73 meters squared >=60 L Final      Estimated Glomerular Filtration Rate (eGFR) is calculated using the 2021 CKD-EPI creatinine equation. This equation utilizes serum creatinine, sex, and age as parameters. The creatinine assay has traceable calibration to isotope dilution-mass spectrometry. Refer to KDIGO guidelines for clinical interpretation. In patients with unstable renal function, e.g. those with acute kidney injury, the eGFR may not accurately reflect actual GFR.  Physical exam  Constitutional: awake, afebrile, not in acute distress  Eyes: clear sclera  ENMT: mucous membranes moist  Head/Neck: neck supple, trachea midline, no JVD  Respiratory/Thorax: CTAB, no rales/rhonchi/wheezing  Cardiovascular: RRR, no rubs/murmurs/gallops  Gastrointestinal: +BS x4, soft, nt/nd/ng/nr  Musculoskeletal: ROM WNL, normal strength   Extremities: no edema, no  cellulitis  Neurological: A&O x3, attention intact, speech fluent   Psychological: Appropriate mood and behavior  Skin: warm and dry, intact  Assessment/Plan   #Physical deconditioning; DOING BETTER   - continue PT/OT  #UTI  - completed Augmentin, stop date 5/12/23  - continue monitor weekly cbc   - afebrile and no leukocytosis   - Repeat UA and urine c/s   #Anemia; s/p blood transfusion, current hgb 10.7 today  #history of peptic ulcer disease s/p gastric ulcer perforation repair with omental patching which was complicated by intra-abdominal abscesses   #GERD  - No source of active bleeding  - continue Iron pill  - continue monitor cbc weekly  - Gastroenterology was consulted and he underwent an endoscopy which showed a stable ulcer but no bleed. Recommendation is to continue PPI twice a day for life  - C/W Protonix 40 mg PO BID   #Depression  - continue with Citalopram   #Malnutrition  - continue regular diet  #HLD  - c/w Atorvastatin 10 mg PO daily  #Med reviewed  #Lab examined  #Discharge planing issues   - Reviewed medications and educated patient for reason of taking medication. Prescriptions were made as patient requested.  -  Follow-up with specialist and also follow-up with community providers/PCP.  - Discussed with SW; patient requires a home visiting RN for monitoring blood sugar, finally patient would benefit from home PT/OT to improve muscle strengthening & to establish a home exercise program due to deconditioning   - Need follow up for urine result     Time spending for discharge > 30 minutes   -Reviewed orders, medications, records, and pertinent labs/x-rays from [facility]. Reviewed VS, BS, O2, etc as per protocol. Reviewed and signed off orders, medications, Labs, x-rays, and current diagnoses in [facility]  -Obtained history  -Performing physical examination  -Counseling and educating patient for pain management   -Ordering medications, lab   -Documenting clinical information in the [facility]   -Care coordinating  with nursing staff     Addendum; urine c/s on 5/19/23 grew ESCHERICHIA COLI                   >=100,000 CFU/ml Escherichia coli. Recurrent UTI; Started bactrim DS take 1 tab PO BID x 7 DAYS. Prescription sent and patient informed. Follow up with PCP.

## 2023-05-22 RX ORDER — SULFAMETHOXAZOLE AND TRIMETHOPRIM 800; 160 MG/1; MG/1
1 TABLET ORAL 2 TIMES DAILY
Qty: 14 TABLET | Refills: 0 | Status: SHIPPED | OUTPATIENT
Start: 2023-05-22 | End: 2023-05-27 | Stop reason: HOSPADM

## 2023-05-22 RX ORDER — AMOXICILLIN AND CLAVULANATE POTASSIUM 875; 125 MG/1; MG/1
875 TABLET, FILM COATED ORAL 2 TIMES DAILY
Qty: 20 TABLET | Refills: 0 | Status: SHIPPED | OUTPATIENT
Start: 2023-05-22 | End: 2023-05-27 | Stop reason: HOSPADM

## 2023-05-25 ENCOUNTER — LAB (OUTPATIENT)
Dept: LAB | Facility: LAB | Age: 83
End: 2023-05-25
Payer: MEDICARE

## 2023-05-25 ENCOUNTER — OFFICE VISIT (OUTPATIENT)
Dept: PRIMARY CARE | Facility: CLINIC | Age: 83
End: 2023-05-25
Payer: MEDICARE

## 2023-05-25 ENCOUNTER — TELEPHONE (OUTPATIENT)
Dept: PRIMARY CARE | Facility: CLINIC | Age: 83
End: 2023-05-25

## 2023-05-25 DIAGNOSIS — N30.00 ACUTE CYSTITIS WITHOUT HEMATURIA: ICD-10-CM

## 2023-05-25 DIAGNOSIS — Z00.00 HEALTHCARE MAINTENANCE: ICD-10-CM

## 2023-05-25 DIAGNOSIS — Z71.9 HEALTH EDUCATION/COUNSELING: Primary | ICD-10-CM

## 2023-05-25 PROCEDURE — 87086 URINE CULTURE/COLONY COUNT: CPT

## 2023-05-25 PROCEDURE — UHSMG PR UH SELECT MEET AND GREET: Performed by: FAMILY MEDICINE

## 2023-05-25 NOTE — PROGRESS NOTES
Discussed [facility]    Also was dx'd with UTI recently -although he had no signs or symptoms  Started on Bactrim and appears to be tolerating well  However we will obtain a urine culture today to look for resolution

## 2023-05-26 LAB — URINE CULTURE: NORMAL

## 2023-05-27 NOTE — TELEPHONE ENCOUNTER
Called patient to notify that his urine culture was negative  He should stop the Bactrim  Did not report any significant or concerning urinary symptoms  Enc call as need be

## 2023-06-07 DIAGNOSIS — K21.9 GASTROESOPHAGEAL REFLUX DISEASE, UNSPECIFIED WHETHER ESOPHAGITIS PRESENT: ICD-10-CM

## 2023-06-07 RX ORDER — PANTOPRAZOLE SODIUM 40 MG/1
40 TABLET, DELAYED RELEASE ORAL 2 TIMES DAILY
Qty: 180 TABLET | Refills: 3 | Status: SHIPPED | OUTPATIENT
Start: 2023-06-07 | End: 2023-07-07

## 2023-06-19 DIAGNOSIS — R26.89 IMBALANCE: ICD-10-CM

## 2023-06-19 DIAGNOSIS — M79.606 PAIN OF LOWER EXTREMITY, UNSPECIFIED LATERALITY: ICD-10-CM

## 2023-06-19 DIAGNOSIS — R53.1 WEAKNESS: ICD-10-CM

## 2023-06-19 DIAGNOSIS — G89.18 ACUTE POST-OPERATIVE PAIN: ICD-10-CM

## 2023-06-19 DIAGNOSIS — M19.011 ARTHRITIS OF SHOULDER REGION, RIGHT: ICD-10-CM

## 2023-07-11 ENCOUNTER — TELEPHONE (OUTPATIENT)
Dept: PRIMARY CARE | Facility: CLINIC | Age: 83
End: 2023-07-11
Payer: MEDICARE

## 2023-07-11 ENCOUNTER — LAB (OUTPATIENT)
Dept: LAB | Facility: LAB | Age: 83
End: 2023-07-11
Payer: MEDICARE

## 2023-07-11 DIAGNOSIS — Z87.440 RECENT URINARY TRACT INFECTION: ICD-10-CM

## 2023-07-11 DIAGNOSIS — R30.0 BURNING WITH URINATION: ICD-10-CM

## 2023-07-11 DIAGNOSIS — R31.9 HEMATURIA, UNSPECIFIED TYPE: ICD-10-CM

## 2023-07-11 PROCEDURE — 87186 SC STD MICRODIL/AGAR DIL: CPT

## 2023-07-11 PROCEDURE — 81001 URINALYSIS AUTO W/SCOPE: CPT

## 2023-07-11 PROCEDURE — 87086 URINE CULTURE/COLONY COUNT: CPT

## 2023-07-11 PROCEDURE — 87077 CULTURE AEROBIC IDENTIFY: CPT

## 2023-07-11 RX ORDER — SULFAMETHOXAZOLE AND TRIMETHOPRIM 800; 160 MG/1; MG/1
1 TABLET ORAL 2 TIMES DAILY
Qty: 14 TABLET | Refills: 0 | Status: SHIPPED | OUTPATIENT
Start: 2023-07-11 | End: 2023-07-18

## 2023-07-11 NOTE — TELEPHONE ENCOUNTER
Orders placed today for UA/Culture.  Patient will drop off a specimen this afternoon.  Please call to discuss whether or not antibiotic should be started.

## 2023-07-11 NOTE — TELEPHONE ENCOUNTER
Urinary freq and decr flow - slt burning - some ? Blood   Has been clearing with fluid intake - however + UTI 2 months ago - will treat with Bactrim as ordered after leaves specimen    Requested Prescriptions     Signed Prescriptions Disp Refills    sulfamethoxazole-trimethoprim (Bactrim DS) 800-160 mg tablet 14 tablet 0     Sig: Take 1 tablet by mouth 2 times a day for 7 days.     Authorizing Provider: ARMAND FINCH

## 2023-07-12 LAB
APPEARANCE, URINE: ABNORMAL
BACTERIA, URINE: ABNORMAL /HPF
BILIRUBIN, URINE: NEGATIVE
BLOOD, URINE: ABNORMAL
COLOR, URINE: YELLOW
GLUCOSE, URINE: NEGATIVE MG/DL
KETONES, URINE: NEGATIVE MG/DL
LEUKOCYTE ESTERASE, URINE: ABNORMAL
MUCUS, URINE: ABNORMAL /LPF
NITRITE, URINE: NEGATIVE
PH, URINE: 5 (ref 5–8)
PROTEIN, URINE: ABNORMAL MG/DL
RBC, URINE: 548 /HPF (ref 0–5)
SPECIFIC GRAVITY, URINE: 1.01 (ref 1–1.03)
SQUAMOUS EPITHELIAL CELLS, URINE: 1 /HPF
UROBILINOGEN, URINE: <2 MG/DL (ref 0–1.9)
WBC CLUMPS, URINE: ABNORMAL /HPF
WBC, URINE: 349 /HPF (ref 0–5)

## 2023-07-14 LAB — URINE CULTURE: ABNORMAL

## 2023-07-21 ENCOUNTER — TELEPHONE (OUTPATIENT)
Dept: PRIMARY CARE | Facility: CLINIC | Age: 83
End: 2023-07-21
Payer: MEDICARE

## 2023-07-21 DIAGNOSIS — Z87.440 RECENT URINARY TRACT INFECTION: Primary | ICD-10-CM

## 2023-07-21 DIAGNOSIS — R82.90 UNSPECIFIED ABNORMAL FINDINGS IN URINE: ICD-10-CM

## 2023-07-21 NOTE — TELEPHONE ENCOUNTER
Pt is calling in regards to finishing RX for UTI and wasn't sure if he should get it rechecked again to make sure it all cleared up - DENNIS

## 2023-07-21 NOTE — TELEPHONE ENCOUNTER
Often we don't, but in his case I think that would be wise - I'll place order for a culture - Thanks!

## 2023-07-22 ENCOUNTER — LAB (OUTPATIENT)
Dept: LAB | Facility: LAB | Age: 83
End: 2023-07-22
Payer: MEDICARE

## 2023-07-22 DIAGNOSIS — R82.90 UNSPECIFIED ABNORMAL FINDINGS IN URINE: ICD-10-CM

## 2023-07-22 DIAGNOSIS — Z87.440 RECENT URINARY TRACT INFECTION: ICD-10-CM

## 2023-07-22 PROCEDURE — 87086 URINE CULTURE/COLONY COUNT: CPT

## 2023-07-22 ASSESSMENT — ENCOUNTER SYMPTOMS
BLOOD IN STOOL: 0
CONSTIPATION: 0
DIARRHEA: 0
DIZZINESS: 0
DYSURIA: 0
FATIGUE: 0
CONFUSION: 0
SHORTNESS OF BREATH: 0
WHEEZING: 0
PALPITATIONS: 0
COUGH: 0
MUSCULOSKELETAL NEGATIVE: 1
APPETITE CHANGE: 0
NAUSEA: 0
LIGHT-HEADEDNESS: 0
ABDOMINAL DISTENTION: 0
ABDOMINAL PAIN: 0
AGITATION: 0
CHILLS: 0
VOMITING: 0
FEVER: 0

## 2023-07-22 NOTE — PROGRESS NOTES
Subjective   Patient ID: Dakota Pugh is a 83 y.o. male who is acute skilled care and presents for follow up multiple medical problems.     HPI   A 83 year old Male with a past medical history significant for hypertension, chronic kidney disease stage III,  history of peptic ulcer disease s/p gastric ulcer perforation repair with omental patching which was complicated by intra-abdominal abscesses. He was discharged home on 4/24/23. He was hospitalization on 5/5/23 for UTI and anemia s/p blood transfusion. Discharged to  for rehab. See Ros.   Review of Systems   Constitutional:  Negative for appetite change, chills, fatigue and fever.   Respiratory:  Negative for cough, shortness of breath and wheezing.    Cardiovascular:  Negative for chest pain, palpitations and leg swelling.   Gastrointestinal:  Negative for abdominal distention, abdominal pain, blood in stool, constipation, diarrhea, nausea and vomiting.   Genitourinary:  Negative for dysuria.   Musculoskeletal: Negative.    Neurological:  Negative for dizziness, syncope and light-headedness.   Psychiatric/Behavioral:  Negative for agitation, behavioral problems and confusion.    Objective   Vitals: T 97.3 - 5/15/2023 14:31 Route: Temporal Artery  Pain: Indicators of pain: None.  T 98.0 - 5/14/2023 10:30 Route: Oral  /62 - 5/14/2023 10:30 Position: Lying l/arm  P 61 - 5/14/2023 10:30 Pulse Type: Regular  R 18.0 - 5/14/2023 10:30  O2 91.0 % - 5/14/2023 10:30 Method: Room Air  LAB ON 5/11/23  CBC       White Blood Cell Count  7.37 k/uL 3.70-11.00  Final           RBC  3.61 m/uL 4.20-6.00 L Final           Hemoglobin  10.1 g/dL 13.0-17.0 L Final           Hematocrit  33.0 % 39.0-51.0 L Final           MCV  91.4 fL 80.0-100.0  Final           MCH  28.0 pg 26.0-34.0  Final           MCHC  30.6 g/dL 30.5-36.0  Final           RDW-CV  15.8 % 11.5-15.0 H Final           Platelet Count  461 k/uL 150-400 H Final           MPV  8.8 fL 9.0-12.7 L Final            Absolute nRBC  <0.01 k/uL <0.01  Final       Basic Metabolic Panl       Glucose  104 mg/dL 74-99 H Final      The American Diabetes Association (ADA) provides guidance for cutoff values for fasting glucose and random glucose. The ADA defines fasting as no caloric intake for at least 8 hours. Fasting plasma glucose results between 100 to 125 mg/dL indicate increased risk for diabetes (prediabetes).  Fasting plasma glucose results greater than or equal to 126 mg/dL meet the criteria for diagnosis of diabetes. In the absence of unequivocal hyperglycemia, results should be confirmed by repeat testing. In a patient with classic symptoms of hyperglycemia or hyperglycemic crisis, random plasma glucose results greater than or equal to 200 mg/dL meet the criteria for diagnosis of diabetes.  Reference: Standards of Medical Care in Diabetes 2016, American Diabetes Association. Diabetes Care. 2016.39(Suppl 1).       BUN  18 mg/dL 9-24  Final           Creatinine  1.33 mg/dL 0.73-1.22 H Final           Sodium  137 mmol/L 136-144  Final           Potassium  3.6 mmol/L 3.7-5.1 L Final           Chloride  104 mmol/L   Final           CO2  21 mmol/L 22-30 L Final           Anion Gap  12 mmol/L 9-18  Final           Calcium, Total  9.3 mg/dL 8.5-10.2  Final           Estimated Glomerular Filtration Rate  53 mL/min/1.73 meters squared >=60 L Final      Estimated Glomerular Filtration Rate (eGFR) is calculated using the 2021 CKD-EPI creatinine equation. This equation utilizes serum creatinine, sex, and age as parameters. The creatinine assay has traceable calibration to isotope dilution-mass spectrometry. Refer to KDIGO guidelines for clinical interpretation. In patients with unstable renal function, e.g. those with acute kidney injury, the eGFR may not accurately reflect actual GFR.  Physical Exam  Constitutional:       General: He is not in acute distress.     Appearance: Normal appearance.   HENT:      Mouth/Throat:       Mouth: Mucous membranes are moist.   Eyes:      General: No scleral icterus.     Conjunctiva/sclera: Conjunctivae normal.   Cardiovascular:      Rate and Rhythm: Normal rate and regular rhythm.      Heart sounds: Normal heart sounds. No murmur heard.  Pulmonary:      Effort: Pulmonary effort is normal. No respiratory distress.      Breath sounds: Normal breath sounds. No wheezing, rhonchi or rales.   Abdominal:      General: Abdomen is flat. Bowel sounds are normal. There is no distension.      Palpations: Abdomen is soft.      Tenderness: There is no abdominal tenderness. There is no guarding.   Musculoskeletal:         General: Normal range of motion.      Cervical back: No tenderness.      Right lower leg: No edema.      Left lower leg: No edema.   Skin:     General: Skin is warm.      Coloration: Skin is not jaundiced.   Neurological:      Mental Status: He is alert and oriented to person, place, and time. Mental status is at baseline.   Psychiatric:         Mood and Affect: Mood normal.         Behavior: Behavior normal.     Assessment/Plan   #Physical deconditioning   - continue PT/OT  #UTI  - complete Augmentin on 5/12/23  - continue monitor weekly cbc   - afebrile and no leukocytosis   #Anemia; s/p blood transfusion, hgb stable, no source of active bleeding  #history of peptic ulcer disease s/p gastric ulcer perforation repair with omental patching which was complicated by intra-abdominal abscesses   - No source of active bleeding  - continue Iron pill  - continue monitor cbc weekly  - Gastroenterology was consulted and he underwent an endoscopy which showed a stable ulcer but no bleed. Recommendation is to continue PPI twice a day for life  #Med reviewed    Chronic medical problems   #hypertension; continue monitor BP   #chronic kidney disease stage III  - continue PO fluid  - avoid nephrotoxic   - renal med   - continue monitor BMP weekly   #Depression  - continue with Citalopram   #peptic ulcer   -  Continue Protonix 40 mg PO BID life long   #Malnutrition  - continue regular diet  #HLD  - c/w Atorvastatin 10 mg PO daily    Problem List Items Addressed This Visit       Anemia    Intra-abdominal abscess (CMS/HCC)    CINDI (acute kidney injury) (CMS/HCC)    Physical deconditioning    Recurrent major depressive disorder, in partial remission (CMS/HCC)    Orthostatic hypotension    Hyponatremia    Severe protein-calorie malnutrition (CMS/HCC) - Primary    Hyperlipidemia    Lumbar neuritis    Acute cystitis without hematuria    Peptic ulcer disease with hemorrhage         - Reviewed orders, medications, records, and pertinent labs/x-rays from [facility]. Reviewed VS, BS, O2, etc as per protocol. Reviewed and signed off orders, medications, Labs, x-rays, and current diagnoses in [facility]  -Obtained history  -Performing physical examination  -Counseling and educating patient's wife for POC as above and updated given in detail  -Ordering medications, lab   -Documenting clinical information in the [facility]   -Care coordinating with nursing staff

## 2023-07-24 LAB — URINE CULTURE: NORMAL

## 2023-07-27 ENCOUNTER — LAB (OUTPATIENT)
Dept: LAB | Facility: LAB | Age: 83
End: 2023-07-27
Payer: MEDICARE

## 2023-07-27 DIAGNOSIS — Z00.00 HEALTHCARE MAINTENANCE: ICD-10-CM

## 2023-07-27 LAB
ALANINE AMINOTRANSFERASE (SGPT) (U/L) IN SER/PLAS: 16 U/L (ref 10–52)
ALBUMIN (G/DL) IN SER/PLAS: 4.3 G/DL (ref 3.4–5)
ALKALINE PHOSPHATASE (U/L) IN SER/PLAS: 116 U/L (ref 33–136)
ANION GAP IN SER/PLAS: 14 MMOL/L (ref 10–20)
APPEARANCE, URINE: CLEAR
ASPARTATE AMINOTRANSFERASE (SGOT) (U/L) IN SER/PLAS: 20 U/L (ref 9–39)
BASOPHILS (10*3/UL) IN BLOOD BY AUTOMATED COUNT: 0.05 X10E9/L (ref 0–0.1)
BASOPHILS/100 LEUKOCYTES IN BLOOD BY AUTOMATED COUNT: 0.7 % (ref 0–2)
BILIRUBIN TOTAL (MG/DL) IN SER/PLAS: 0.5 MG/DL (ref 0–1.2)
BILIRUBIN, URINE: NEGATIVE
BLOOD, URINE: NEGATIVE
C REACTIVE PROTEIN (MG/L) IN SER/PLAS BY HIGH SENSIT: 8.7 MG/L
CALCIDIOL (25 OH VITAMIN D3) (NG/ML) IN SER/PLAS: 54 NG/ML
CALCIUM (MG/DL) IN SER/PLAS: 9.3 MG/DL (ref 8.6–10.6)
CARBON DIOXIDE, TOTAL (MMOL/L) IN SER/PLAS: 26 MMOL/L (ref 21–32)
CHLORIDE (MMOL/L) IN SER/PLAS: 103 MMOL/L (ref 98–107)
CHOLESTEROL (MG/DL) IN SER/PLAS: 226 MG/DL (ref 0–199)
CHOLESTEROL IN HDL (MG/DL) IN SER/PLAS: 40.7 MG/DL
CHOLESTEROL/HDL RATIO: 5.6
COLOR, URINE: YELLOW
CREATININE (MG/DL) IN SER/PLAS: 1.32 MG/DL (ref 0.5–1.3)
EOSINOPHILS (10*3/UL) IN BLOOD BY AUTOMATED COUNT: 0.29 X10E9/L (ref 0–0.4)
EOSINOPHILS/100 LEUKOCYTES IN BLOOD BY AUTOMATED COUNT: 4.1 % (ref 0–6)
ERYTHROCYTE DISTRIBUTION WIDTH (RATIO) BY AUTOMATED COUNT: 15.8 % (ref 11.5–14.5)
ERYTHROCYTE MEAN CORPUSCULAR HEMOGLOBIN CONCENTRATION (G/DL) BY AUTOMATED: 32.5 G/DL (ref 32–36)
ERYTHROCYTE MEAN CORPUSCULAR VOLUME (FL) BY AUTOMATED COUNT: 88 FL (ref 80–100)
ERYTHROCYTES (10*6/UL) IN BLOOD BY AUTOMATED COUNT: 4.16 X10E12/L (ref 4.5–5.9)
ESTIMATED AVERAGE GLUCOSE FOR HBA1C: 103 MG/DL
GFR MALE: 53 ML/MIN/1.73M2
GLUCOSE (MG/DL) IN SER/PLAS: 93 MG/DL (ref 74–99)
GLUCOSE, URINE: NEGATIVE MG/DL
HEMATOCRIT (%) IN BLOOD BY AUTOMATED COUNT: 36.6 % (ref 41–52)
HEMOGLOBIN (G/DL) IN BLOOD: 11.9 G/DL (ref 13.5–17.5)
HEMOGLOBIN A1C/HEMOGLOBIN TOTAL IN BLOOD: 5.2 %
IMMATURE GRANULOCYTES/100 LEUKOCYTES IN BLOOD BY AUTOMATED COUNT: 0.3 % (ref 0–0.9)
KETONES, URINE: NEGATIVE MG/DL
LDL: 147 MG/DL (ref 0–99)
LEUKOCYTE ESTERASE, URINE: ABNORMAL
LEUKOCYTES (10*3/UL) IN BLOOD BY AUTOMATED COUNT: 7.1 X10E9/L (ref 4.4–11.3)
LYMPHOCYTES (10*3/UL) IN BLOOD BY AUTOMATED COUNT: 2.61 X10E9/L (ref 0.8–3)
LYMPHOCYTES/100 LEUKOCYTES IN BLOOD BY AUTOMATED COUNT: 36.7 % (ref 13–44)
MONOCYTES (10*3/UL) IN BLOOD BY AUTOMATED COUNT: 0.51 X10E9/L (ref 0.05–0.8)
MONOCYTES/100 LEUKOCYTES IN BLOOD BY AUTOMATED COUNT: 7.2 % (ref 2–10)
MUCUS, URINE: ABNORMAL /LPF
NEUTROPHILS (10*3/UL) IN BLOOD BY AUTOMATED COUNT: 3.64 X10E9/L (ref 1.6–5.5)
NEUTROPHILS/100 LEUKOCYTES IN BLOOD BY AUTOMATED COUNT: 51 % (ref 40–80)
NITRITE, URINE: NEGATIVE
NRBC (PER 100 WBCS) BY AUTOMATED COUNT: 0 /100 WBC (ref 0–0)
PH, URINE: 6 (ref 5–8)
PLATELETS (10*3/UL) IN BLOOD AUTOMATED COUNT: 153 X10E9/L (ref 150–450)
POTASSIUM (MMOL/L) IN SER/PLAS: 4.2 MMOL/L (ref 3.5–5.3)
PROSTATE SPECIFIC ANTIGEN,SCREEN: 0.14 NG/ML (ref 0–4)
PROTEIN TOTAL: 7 G/DL (ref 6.4–8.2)
PROTEIN, URINE: NEGATIVE MG/DL
RBC, URINE: 3 /HPF (ref 0–5)
SODIUM (MMOL/L) IN SER/PLAS: 139 MMOL/L (ref 136–145)
SPECIFIC GRAVITY, URINE: 1.01 (ref 1–1.03)
THYROTROPIN (MIU/L) IN SER/PLAS BY DETECTION LIMIT <= 0.05 MIU/L: 3.1 MIU/L (ref 0.44–3.98)
TRIGLYCERIDE (MG/DL) IN SER/PLAS: 193 MG/DL (ref 0–149)
UREA NITROGEN (MG/DL) IN SER/PLAS: 28 MG/DL (ref 6–23)
UROBILINOGEN, URINE: <2 MG/DL (ref 0–1.9)
VLDL: 39 MG/DL (ref 0–40)
WBC, URINE: 10 /HPF (ref 0–5)

## 2023-07-27 PROCEDURE — 84153 ASSAY OF PSA TOTAL: CPT

## 2023-07-27 PROCEDURE — 80061 LIPID PANEL: CPT

## 2023-07-27 PROCEDURE — 82306 VITAMIN D 25 HYDROXY: CPT

## 2023-07-27 PROCEDURE — 83036 HEMOGLOBIN GLYCOSYLATED A1C: CPT

## 2023-07-27 PROCEDURE — 84443 ASSAY THYROID STIM HORMONE: CPT

## 2023-07-27 PROCEDURE — 80053 COMPREHEN METABOLIC PANEL: CPT

## 2023-07-27 PROCEDURE — 36415 COLL VENOUS BLD VENIPUNCTURE: CPT

## 2023-07-27 PROCEDURE — 81001 URINALYSIS AUTO W/SCOPE: CPT

## 2023-07-27 PROCEDURE — 85025 COMPLETE CBC W/AUTO DIFF WBC: CPT

## 2023-07-27 PROCEDURE — 86141 C-REACTIVE PROTEIN HS: CPT

## 2023-07-31 ENCOUNTER — OFFICE VISIT (OUTPATIENT)
Dept: PRIMARY CARE | Facility: CLINIC | Age: 83
End: 2023-07-31
Payer: MEDICARE

## 2023-07-31 VITALS
HEART RATE: 64 BPM | BODY MASS INDEX: 24.51 KG/M2 | DIASTOLIC BLOOD PRESSURE: 52 MMHG | OXYGEN SATURATION: 92 % | HEIGHT: 70 IN | WEIGHT: 171.2 LBS | SYSTOLIC BLOOD PRESSURE: 100 MMHG | TEMPERATURE: 98.2 F

## 2023-07-31 DIAGNOSIS — Z00.01 ENCOUNTER FOR GENERAL ADULT MEDICAL EXAMINATION WITH ABNORMAL FINDINGS: Primary | ICD-10-CM

## 2023-07-31 DIAGNOSIS — R31.9 URINARY TRACT INFECTION WITH HEMATURIA, SITE UNSPECIFIED: ICD-10-CM

## 2023-07-31 DIAGNOSIS — R79.82 ELEVATED HIGH SENSITIVITY C-REACTIVE PROTEIN: ICD-10-CM

## 2023-07-31 DIAGNOSIS — D64.9 ANEMIA, UNSPECIFIED TYPE: ICD-10-CM

## 2023-07-31 DIAGNOSIS — M81.0 OSTEOPOROSIS WITHOUT CURRENT PATHOLOGICAL FRACTURE, UNSPECIFIED OSTEOPOROSIS TYPE: ICD-10-CM

## 2023-07-31 DIAGNOSIS — E78.01 ESSENTIAL FAMILIAL HYPERCHOLESTEROLEMIA: ICD-10-CM

## 2023-07-31 DIAGNOSIS — N39.0 URINARY TRACT INFECTION WITH HEMATURIA, SITE UNSPECIFIED: ICD-10-CM

## 2023-07-31 DIAGNOSIS — K29.70 GASTRITIS, PRESENCE OF BLEEDING UNSPECIFIED, UNSPECIFIED CHRONICITY, UNSPECIFIED GASTRITIS TYPE: ICD-10-CM

## 2023-07-31 DIAGNOSIS — E78.5 DYSLIPIDEMIA: ICD-10-CM

## 2023-07-31 PROCEDURE — 1126F AMNT PAIN NOTED NONE PRSNT: CPT | Performed by: FAMILY MEDICINE

## 2023-07-31 PROCEDURE — UHSPHYS PR UH SELECT PHYSICAL: Performed by: FAMILY MEDICINE

## 2023-07-31 PROCEDURE — 1036F TOBACCO NON-USER: CPT | Performed by: FAMILY MEDICINE

## 2023-07-31 PROCEDURE — 1159F MED LIST DOCD IN RCRD: CPT | Performed by: FAMILY MEDICINE

## 2023-07-31 RX ORDER — TRIAMCINOLONE ACETONIDE 1 MG/G
CREAM TOPICAL
COMMUNITY
Start: 2023-07-05

## 2023-07-31 ASSESSMENT — ENCOUNTER SYMPTOMS
DEPRESSION: 0
LOSS OF SENSATION IN FEET: 0
OCCASIONAL FEELINGS OF UNSTEADINESS: 1

## 2023-07-31 ASSESSMENT — LIFESTYLE VARIABLES
HOW OFTEN DO YOU HAVE SIX OR MORE DRINKS ON ONE OCCASION: NEVER
HOW MANY STANDARD DRINKS CONTAINING ALCOHOL DO YOU HAVE ON A TYPICAL DAY: 1 OR 2
SKIP TO QUESTIONS 9-10: 1
HOW OFTEN DO YOU HAVE A DRINK CONTAINING ALCOHOL: 2-4 TIMES A MONTH
AUDIT-C TOTAL SCORE: 2

## 2023-07-31 ASSESSMENT — PATIENT HEALTH QUESTIONNAIRE - PHQ9
SUM OF ALL RESPONSES TO PHQ9 QUESTIONS 1 & 2: 0
2. FEELING DOWN, DEPRESSED OR HOPELESS: NOT AT ALL
1. LITTLE INTEREST OR PLEASURE IN DOING THINGS: NOT AT ALL

## 2023-07-31 ASSESSMENT — PAIN SCALES - GENERAL: PAINLEVEL: 0-NO PAIN

## 2023-07-31 NOTE — PROGRESS NOTES
Subjective   Patient ID: Dakota Pugh is a 83 y.o. male who presents for Annual Exam ([facility]).  HPI  1) UTI - issues - no recent pain or discpoloration   Urinary frequency all day and night - not before the UTI  Every 2h approx   Recent UA okay   Has not been tried on any medication for this and is not inclined to necessarily start one    2) vomiting - takes PPI in AM and another in the evening - waits 40 min before eating   Feels nausea before episodes -no clear precipitant or specific food will trigger this    3) history of osteoporosis  Had seen Dr. Verma in rheumatology  He had recommended using Evenity monthly for a full year and then switch to Prolia however it was recommended by his previous PCP to not do Evenity because of cardiac risk even though he has not had any specific events nor is he necessarily at significant risk from that  Nonetheless he is hesitant to use the medication and questions what he should do at this point  He never really ended up using anything and ultimately ended up with a prolonged hospital stay before he could engage in further preventive treatment    4) question what he needs to do for follow-up about his stomach ulcer which is healing at the time of assessment earlier this year  No current epigastric pain nor does he note any concerning change in bowel habits such as blood in stool or dark tarry stools    5) history of iron deficiency, seems to have some improvement with recent counts  Seems to be tolerating well otherwise  Plans to continue for another 6 weeks    6) lipid profile not ideal and likely impacted by his recent hospital stay and recovery   Has been on simvastatin for a long period of time and has tolerated well  Denies chest pain, shortness of breath, dizziness, lightheadedness, hand or foot swelling that is new or different.  Taking and tolerating medications well.  Doing better with physical activity - using walker and doing some PT    7) history of low  "blood pressures  He has not had any hypotensive episodes noted  Anticipate as he gets back to his baseline nutrition and activity that this will level out    8) wife concerned about mood, but he feels he still is interested in being social as he was previously - no clear depressed symptoms    Review of Systems   Constitutional:  Negative for diaphoresis, fatigue and fever.   HENT:  Negative for ear pain and hearing loss.    Eyes:  Negative for pain and visual disturbance.   Respiratory:  Negative for cough and shortness of breath.    Cardiovascular:  Negative for chest pain and leg swelling.   Gastrointestinal:  Negative for abdominal pain, blood in stool, constipation and diarrhea.   Genitourinary:  Positive for frequency. Negative for dysuria.   Musculoskeletal:  Negative for arthralgias, back pain, joint swelling and myalgias.   Skin:  Negative for rash.   Neurological:  Positive for weakness. Negative for dizziness, tremors and headaches.   Psychiatric/Behavioral:  Negative for behavioral problems, dysphoric mood and sleep disturbance. The patient is not nervous/anxious.        Objective   /52 (BP Location: Left arm, Patient Position: Sitting, BP Cuff Size: Adult)   Pulse 64   Temp 36.8 °C (98.2 °F) (Temporal)   Ht 1.787 m (5' 10.35\")   Wt 77.7 kg (171 lb 3.2 oz)   SpO2 92%   BMI 24.32 kg/m²     Physical Exam  Vitals and nursing note reviewed.   Constitutional:       General: He is not in acute distress.     Appearance: He is not ill-appearing.   HENT:      Head: Normocephalic and atraumatic.      Ears:      Comments: Some cerumen bilat     Nose: No congestion or rhinorrhea.      Mouth/Throat:      Mouth: Mucous membranes are moist.   Eyes:      General: No scleral icterus.     Extraocular Movements: Extraocular movements intact.   Cardiovascular:      Rate and Rhythm: Normal rate and regular rhythm.      Heart sounds: No murmur heard.  Pulmonary:      Breath sounds: Normal breath sounds. No wheezing " or rhonchi.   Abdominal:      General: There is no distension.      Palpations: Abdomen is soft.      Tenderness: There is no abdominal tenderness.   Musculoskeletal:      Cervical back: Normal range of motion.      Right lower leg: No edema.      Left lower leg: No edema.   Lymphadenopathy:      Cervical: No cervical adenopathy.   Skin:     General: Skin is warm and dry.   Neurological:      Mental Status: He is alert and oriented to person, place, and time.      Motor: Weakness present.      Coordination: Coordination normal.   Psychiatric:         Mood and Affect: Mood normal.         Behavior: Behavior normal.         Thought Content: Thought content normal.         Judgment: Judgment normal.         Assessment/Plan   Problem List Items Addressed This Visit       Anemia    Relevant Orders    Ferritin    CBC and Auto Differential    Essential familial hypercholesterolemia    Relevant Orders    Lipid panel    Osteoporosis     Other Visit Diagnoses       Encounter for general adult medical examination with abnormal findings    -  Primary    Gastritis, presence of bleeding unspecified, unspecified chronicity, unspecified gastritis type        Elevated high sensitivity C-reactive protein        Relevant Orders    C-reactive protein    Dyslipidemia        Urinary tract infection with hematuria, site unspecified            1) UTI / freq issues - follow - recent Cx neg    2) vomiting / ulcer - stop PM PPI as > 3 months - will reach out to Dr Tanner about follow-up    3) history of osteoporosis  Will reach out to Dr. Verma     4) history of iron deficiency - sig better - continue for another 6 weeks    6) lipid profile repeat in 6 weeks

## 2023-08-02 ASSESSMENT — ENCOUNTER SYMPTOMS
DIZZINESS: 0
HEADACHES: 0
ARTHRALGIAS: 0
JOINT SWELLING: 0
SHORTNESS OF BREATH: 0
DYSURIA: 0
FATIGUE: 0
BLOOD IN STOOL: 0
DYSPHORIC MOOD: 0
CONSTIPATION: 0
COUGH: 0
WEAKNESS: 1
MYALGIAS: 0
DIARRHEA: 0
FREQUENCY: 1
SLEEP DISTURBANCE: 0
FEVER: 0
NERVOUS/ANXIOUS: 0
EYE PAIN: 0
ABDOMINAL PAIN: 0
TREMORS: 0
DIAPHORESIS: 0
BACK PAIN: 0

## 2023-08-11 LAB — GASTRIN (PG/ML) IN SER/PLAS: 52 PG/ML (ref 0–100)

## 2023-08-16 ENCOUNTER — TELEPHONE (OUTPATIENT)
Dept: PRIMARY CARE | Facility: CLINIC | Age: 83
End: 2023-08-16
Payer: MEDICARE

## 2023-08-16 DIAGNOSIS — M79.652 PAIN OF LEFT THIGH: Primary | ICD-10-CM

## 2023-08-16 DIAGNOSIS — Z87.81 HISTORY OF FEMUR FRACTURE: ICD-10-CM

## 2023-08-16 DIAGNOSIS — M81.0 OSTEOPOROSIS WITHOUT CURRENT PATHOLOGICAL FRACTURE, UNSPECIFIED OSTEOPOROSIS TYPE: ICD-10-CM

## 2023-08-16 NOTE — TELEPHONE ENCOUNTER
Called and discussed with patient couple of issues  First of all his physical therapist reached out yesterday about hesitation in doing further work with him given some pain he is having in his left femur area  Does have prior history of fracture  Would recommend getting back to Dr. Huggins's office to more effectively and efficiently get to the bottom of things    Also had heard back from Dr. Verma in rheumatology that would prefer to see the patient directly to have a face-to-face discussion about appropriate treatment options for him if any

## 2023-09-09 ENCOUNTER — LAB (OUTPATIENT)
Dept: LAB | Facility: LAB | Age: 83
End: 2023-09-09
Payer: MEDICARE

## 2023-09-09 DIAGNOSIS — D64.9 ANEMIA, UNSPECIFIED TYPE: ICD-10-CM

## 2023-09-09 DIAGNOSIS — R79.82 ELEVATED HIGH SENSITIVITY C-REACTIVE PROTEIN: ICD-10-CM

## 2023-09-09 DIAGNOSIS — E78.01 ESSENTIAL FAMILIAL HYPERCHOLESTEROLEMIA: ICD-10-CM

## 2023-09-09 LAB
BASOPHILS (10*3/UL) IN BLOOD BY AUTOMATED COUNT: 0.07 X10E9/L (ref 0–0.1)
BASOPHILS/100 LEUKOCYTES IN BLOOD BY AUTOMATED COUNT: 1 % (ref 0–2)
C REACTIVE PROTEIN (MG/L) IN SER/PLAS: 0.85 MG/DL
CHOLESTEROL (MG/DL) IN SER/PLAS: 191 MG/DL (ref 0–199)
CHOLESTEROL IN HDL (MG/DL) IN SER/PLAS: 42.3 MG/DL
CHOLESTEROL/HDL RATIO: 4.5
EOSINOPHILS (10*3/UL) IN BLOOD BY AUTOMATED COUNT: 0.3 X10E9/L (ref 0–0.4)
EOSINOPHILS/100 LEUKOCYTES IN BLOOD BY AUTOMATED COUNT: 4.4 % (ref 0–6)
ERYTHROCYTE DISTRIBUTION WIDTH (RATIO) BY AUTOMATED COUNT: 14.1 % (ref 11.5–14.5)
ERYTHROCYTE MEAN CORPUSCULAR HEMOGLOBIN CONCENTRATION (G/DL) BY AUTOMATED: 32.6 G/DL (ref 32–36)
ERYTHROCYTE MEAN CORPUSCULAR VOLUME (FL) BY AUTOMATED COUNT: 91 FL (ref 80–100)
ERYTHROCYTES (10*6/UL) IN BLOOD BY AUTOMATED COUNT: 4.3 X10E12/L (ref 4.5–5.9)
FERRITIN (UG/LL) IN SER/PLAS: 586 UG/L (ref 20–300)
HEMATOCRIT (%) IN BLOOD BY AUTOMATED COUNT: 39 % (ref 41–52)
HEMOGLOBIN (G/DL) IN BLOOD: 12.7 G/DL (ref 13.5–17.5)
IMMATURE GRANULOCYTES/100 LEUKOCYTES IN BLOOD BY AUTOMATED COUNT: 0.1 % (ref 0–0.9)
LDL: 121 MG/DL (ref 0–99)
LEUKOCYTES (10*3/UL) IN BLOOD BY AUTOMATED COUNT: 6.8 X10E9/L (ref 4.4–11.3)
LYMPHOCYTES (10*3/UL) IN BLOOD BY AUTOMATED COUNT: 2.76 X10E9/L (ref 0.8–3)
LYMPHOCYTES/100 LEUKOCYTES IN BLOOD BY AUTOMATED COUNT: 40.8 % (ref 13–44)
MONOCYTES (10*3/UL) IN BLOOD BY AUTOMATED COUNT: 0.59 X10E9/L (ref 0.05–0.8)
MONOCYTES/100 LEUKOCYTES IN BLOOD BY AUTOMATED COUNT: 8.7 % (ref 2–10)
NEUTROPHILS (10*3/UL) IN BLOOD BY AUTOMATED COUNT: 3.04 X10E9/L (ref 1.6–5.5)
NEUTROPHILS/100 LEUKOCYTES IN BLOOD BY AUTOMATED COUNT: 45 % (ref 40–80)
NRBC (PER 100 WBCS) BY AUTOMATED COUNT: 0 /100 WBC (ref 0–0)
PLATELETS (10*3/UL) IN BLOOD AUTOMATED COUNT: 153 X10E9/L (ref 150–450)
TRIGLYCERIDE (MG/DL) IN SER/PLAS: 138 MG/DL (ref 0–149)
VLDL: 28 MG/DL (ref 0–40)

## 2023-09-09 PROCEDURE — 36415 COLL VENOUS BLD VENIPUNCTURE: CPT

## 2023-09-09 PROCEDURE — 82728 ASSAY OF FERRITIN: CPT

## 2023-09-09 PROCEDURE — 85025 COMPLETE CBC W/AUTO DIFF WBC: CPT

## 2023-09-09 PROCEDURE — 80061 LIPID PANEL: CPT

## 2023-09-09 PROCEDURE — 86140 C-REACTIVE PROTEIN: CPT

## 2023-09-18 DIAGNOSIS — E78.00 HYPERCHOLESTEROLEMIA: ICD-10-CM

## 2023-09-18 RX ORDER — SIMVASTATIN 20 MG/1
20 TABLET, FILM COATED ORAL DAILY
Qty: 90 TABLET | Refills: 3 | Status: SHIPPED | OUTPATIENT
Start: 2023-09-18

## 2023-09-19 ENCOUNTER — TELEPHONE (OUTPATIENT)
Dept: PRIMARY CARE | Facility: CLINIC | Age: 83
End: 2023-09-19
Payer: MEDICARE

## 2023-09-19 DIAGNOSIS — N18.31 ANEMIA DUE TO STAGE 3A CHRONIC KIDNEY DISEASE (MULTI): Primary | ICD-10-CM

## 2023-09-19 DIAGNOSIS — E61.1 IRON DEFICIENCY: ICD-10-CM

## 2023-09-19 DIAGNOSIS — D63.1 ANEMIA DUE TO STAGE 3A CHRONIC KIDNEY DISEASE (MULTI): Primary | ICD-10-CM

## 2023-09-19 NOTE — TELEPHONE ENCOUNTER
Called back and discussed with patient  Indeed as he is not really loving taking iron I think it is perfectly appropriate for him to stop taking it   there are some confounding variables that have been measured here  although he still shows low hemoglobin and anemia, his red blood cell indices are normal also, his ferritin which is elevated is likely perhaps influenced by inflammation as much as it might be iron stores  Nonetheless he will hold off on it and recheck iron studies as planned

## 2023-10-08 PROBLEM — R74.8 ELEVATED ALKALINE PHOSPHATASE LEVEL: Status: ACTIVE | Noted: 2023-10-08

## 2023-10-08 PROBLEM — G89.29 CHRONIC PAIN OF LEFT LOWER EXTREMITY: Status: ACTIVE | Noted: 2023-10-08

## 2023-10-08 PROBLEM — R94.31 EKG, ABNORMAL: Status: ACTIVE | Noted: 2023-10-08

## 2023-10-08 PROBLEM — M19.019 SHOULDER ARTHRITIS: Status: ACTIVE | Noted: 2023-10-08

## 2023-10-08 PROBLEM — Z96.612 STATUS POST TOTAL REPLACEMENT OF LEFT SHOULDER: Status: ACTIVE | Noted: 2023-10-08

## 2023-10-08 PROBLEM — C44.319 BASAL CELL CARCINOMA OF SKIN OF OTHER PARTS OF FACE: Status: ACTIVE | Noted: 2019-06-27

## 2023-10-08 PROBLEM — M62.81 MUSCLE WEAKNESS: Status: ACTIVE | Noted: 2023-10-08

## 2023-10-08 PROBLEM — Z91.89 AT RISK FOR CONSTIPATION: Status: ACTIVE | Noted: 2023-10-08

## 2023-10-08 PROBLEM — Z96.611 STATUS POST TOTAL REPLACEMENT OF RIGHT SHOULDER: Status: ACTIVE | Noted: 2023-10-08

## 2023-10-08 PROBLEM — M79.605 CHRONIC PAIN OF LEFT LOWER EXTREMITY: Status: ACTIVE | Noted: 2023-10-08

## 2023-10-08 PROBLEM — R07.9 CHEST PAIN: Status: ACTIVE | Noted: 2023-10-08

## 2023-10-08 PROBLEM — E66.3 OVERWEIGHT WITH BODY MASS INDEX (BMI) OF 28 TO 28.9 IN ADULT: Status: ACTIVE | Noted: 2023-10-08

## 2023-10-08 PROBLEM — Z79.2 NEED FOR PROPHYLACTIC ANTIBIOTIC: Status: ACTIVE | Noted: 2023-10-08

## 2023-10-08 RX ORDER — ROMOSOZUMAB-AQQG 105 MG/1.17ML
INJECTION, SOLUTION SUBCUTANEOUS
COMMUNITY
Start: 2022-09-28

## 2023-10-08 RX ORDER — ACETAMINOPHEN 500 MG
1 TABLET ORAL EVERY 8 HOURS PRN
COMMUNITY

## 2023-10-08 RX ORDER — FAMOTIDINE 20 MG/1
1 TABLET, FILM COATED ORAL NIGHTLY
COMMUNITY
Start: 2023-08-08 | End: 2023-10-16 | Stop reason: SDUPTHER

## 2023-10-08 RX ORDER — OMEGA-3 FATTY ACIDS 1000 MG
1 CAPSULE ORAL DAILY
COMMUNITY

## 2023-10-08 RX ORDER — CALCIUM CARBONATE 500(1250)
1 TABLET ORAL DAILY
COMMUNITY

## 2023-10-10 ENCOUNTER — TREATMENT (OUTPATIENT)
Dept: PHYSICAL THERAPY | Facility: CLINIC | Age: 83
End: 2023-10-10
Payer: MEDICARE

## 2023-10-10 DIAGNOSIS — M62.81 MUSCLE WEAKNESS: Primary | ICD-10-CM

## 2023-10-10 PROCEDURE — 97110 THERAPEUTIC EXERCISES: CPT | Mod: GP

## 2023-10-10 ASSESSMENT — PAIN - FUNCTIONAL ASSESSMENT: PAIN_FUNCTIONAL_ASSESSMENT: 0-10

## 2023-10-10 ASSESSMENT — PAIN SCALES - GENERAL: PAINLEVEL_OUTOF10: 3

## 2023-10-10 NOTE — PROGRESS NOTES
"Physical Therapy  Physical Therapy Treatment    Patient Name: Dakota Pugh  MRN: [id number]  Today's Date: 10/10/2023  Time Calculation  Start Time: 1515  Stop Time: 1555  Time Calculation (min): 40 min    Insurance:  Visit number: 11   Approved number of visits: MN   Authorization not required after evaluation   Medicare + [employer]   Onset Date: 2023  Beginnin2023 Ending: 10/ 24/ 2023      General:  Reason for Visit [facility]: LE weakness.   Referred by: Dr. Brody    Assessment:   Patient has improved in gait endurance but still struggles with sit to stand without UE support.     Plan:   Continue to increase LE strength, flexibility, balance, gait and endurance to tolerance for return to PLOF.       Current Problem  1. Muscle weakness              Precautions  Precautions Comment: none      Pain Assessment: 0-10  Pain Score: 3    Subjective:   Patient reports Left quad still hurts when he is WB and if he twists on LLE. Going to gym 2x/week.  HEP Performed:  partially    Objective:   Gait: slight fwd flexed with wheeled walker    Treatments:     MEGAN stepper, seat 8, L4, 5'  Sit to stand from 21 inch chair x 10  6\" step ups x 10 B with B UE support  6\" 2 step up/down with one railing and one cane x 5 B  Cybex hip abd/add 25#, 15x2 each  HSC 30#, 12x2   Seated HS stretch 1' B.   Access Code: [email or web address]    Charges: Charges: TEx3    Melissa Rolle, PT  "

## 2023-10-16 DIAGNOSIS — K30 ACID INDIGESTION: ICD-10-CM

## 2023-10-16 RX ORDER — FAMOTIDINE 20 MG/1
20 TABLET, FILM COATED ORAL NIGHTLY
Qty: 90 TABLET | Refills: 3 | Status: SHIPPED | OUTPATIENT
Start: 2023-10-16

## 2023-10-16 NOTE — PROGRESS NOTES
"Physical Therapy  Physical Therapy Treatment    Patient Name: Dakota Pugh  MRN: [id number]  Today's Date: 10/17/2023  Time Calculation  Start Time: 1530  Stop Time: 1615  Time Calculation (min): 45 min    Insurance:  Visit number: 12   Approved number of visits: MN   Authorization not required after evaluation   Medicare + [employer]   Onset Date: 2023  Beginnin2023 Ending: 10/ 24/ 2023      General:  Reason for Visit: LE weakness.   Referred by: Dr. Brody      Current Problem  1. Muscle weakness            Precautions  Precautions Comment: none      Pain Assessment: 0-10  Pain Score: 5 - Moderate pain    Subjective:   Patient reports soreness after his busy weekend with family.      HEP Performed:  partially    Objective:   Gait: slight fwd flexed with wheeled walker    Treatments:     R. stepper, seat 8, L5, 5'  Standing hip abd x 15 B  Standing hip ext x 15 B  Standing march x 2'  Fwd bosu lunges x 12 B  Sit to stand from 21 inch chair x 10- not today  4\" step ups x 10 B with B UE support  Cybex hip abd/add 25#, 15x2 each  HSC 30#, 12x2   Leg press 55#, 20x2  Seated HS stretch 1' B.   Access Code: [email or web address]    Charges: Charges: Tex3    Assessment:   Patient more fatigued today overall and required more rest breaks.    Plan:   Continue to increase LE strength, flexibility, balance, gait and endurance to tolerance for return to PLOF.     Melissa Rolle, PT  "

## 2023-10-17 ENCOUNTER — TREATMENT (OUTPATIENT)
Dept: PHYSICAL THERAPY | Facility: CLINIC | Age: 83
End: 2023-10-17
Payer: MEDICARE

## 2023-10-17 DIAGNOSIS — M62.81 MUSCLE WEAKNESS: Primary | ICD-10-CM

## 2023-10-17 PROCEDURE — 97110 THERAPEUTIC EXERCISES: CPT | Mod: GP

## 2023-10-17 ASSESSMENT — PAIN - FUNCTIONAL ASSESSMENT: PAIN_FUNCTIONAL_ASSESSMENT: 0-10

## 2023-10-17 ASSESSMENT — PAIN SCALES - GENERAL: PAINLEVEL_OUTOF10: 5 - MODERATE PAIN

## 2023-10-18 ENCOUNTER — LAB (OUTPATIENT)
Dept: LAB | Facility: LAB | Age: 83
End: 2023-10-18
Payer: MEDICARE

## 2023-10-18 DIAGNOSIS — E61.1 IRON DEFICIENCY: ICD-10-CM

## 2023-10-18 DIAGNOSIS — D63.1 ANEMIA DUE TO STAGE 3A CHRONIC KIDNEY DISEASE (MULTI): ICD-10-CM

## 2023-10-18 DIAGNOSIS — N18.31 ANEMIA DUE TO STAGE 3A CHRONIC KIDNEY DISEASE (MULTI): ICD-10-CM

## 2023-10-18 LAB
BASOPHILS # BLD AUTO: 0.06 X10*3/UL (ref 0–0.1)
BASOPHILS NFR BLD AUTO: 0.9 %
EOSINOPHIL # BLD AUTO: 0.32 X10*3/UL (ref 0–0.4)
EOSINOPHIL NFR BLD AUTO: 4.8 %
ERYTHROCYTE [DISTWIDTH] IN BLOOD BY AUTOMATED COUNT: 13.5 % (ref 11.5–14.5)
FERRITIN SERPL-MCNC: 632 NG/ML (ref 20–300)
HCT VFR BLD AUTO: 39.5 % (ref 41–52)
HGB BLD-MCNC: 12.7 G/DL (ref 13.5–17.5)
IMM GRANULOCYTES # BLD AUTO: 0.04 X10*3/UL (ref 0–0.5)
IMM GRANULOCYTES NFR BLD AUTO: 0.6 % (ref 0–0.9)
IRON SATN MFR SERPL: 37 % (ref 25–45)
IRON SERPL-MCNC: 86 UG/DL (ref 35–150)
LYMPHOCYTES # BLD AUTO: 2.71 X10*3/UL (ref 0.8–3)
LYMPHOCYTES NFR BLD AUTO: 40.9 %
MCH RBC QN AUTO: 28.9 PG (ref 26–34)
MCHC RBC AUTO-ENTMCNC: 32.2 G/DL (ref 32–36)
MCV RBC AUTO: 90 FL (ref 80–100)
MONOCYTES # BLD AUTO: 0.54 X10*3/UL (ref 0.05–0.8)
MONOCYTES NFR BLD AUTO: 8.1 %
NEUTROPHILS # BLD AUTO: 2.96 X10*3/UL (ref 1.6–5.5)
NEUTROPHILS NFR BLD AUTO: 44.7 %
NRBC BLD-RTO: 0 /100 WBCS (ref 0–0)
PLATELET # BLD AUTO: 210 X10*3/UL (ref 150–450)
PMV BLD AUTO: 10.9 FL (ref 7.5–11.5)
RBC # BLD AUTO: 4.39 X10*6/UL (ref 4.5–5.9)
TIBC SERPL-MCNC: 231 UG/DL (ref 240–445)
UIBC SERPL-MCNC: 145 UG/DL (ref 110–370)
WBC # BLD AUTO: 6.6 X10*3/UL (ref 4.4–11.3)

## 2023-10-18 PROCEDURE — 83550 IRON BINDING TEST: CPT

## 2023-10-18 PROCEDURE — 85025 COMPLETE CBC W/AUTO DIFF WBC: CPT

## 2023-10-18 PROCEDURE — 36415 COLL VENOUS BLD VENIPUNCTURE: CPT

## 2023-10-18 PROCEDURE — 83540 ASSAY OF IRON: CPT

## 2023-10-18 PROCEDURE — 82728 ASSAY OF FERRITIN: CPT

## 2023-10-23 ENCOUNTER — TELEPHONE (OUTPATIENT)
Dept: PRIMARY CARE | Facility: CLINIC | Age: 83
End: 2023-10-23
Payer: MEDICARE

## 2023-10-23 NOTE — PROGRESS NOTES
"Physical Therapy  Physical Therapy Treatment    Patient Name: Dakota Pugh  MRN: [id number]  Today's Date: 10/24/2023  Time Calculation  Start Time: 1400  Stop Time: 1440  Time Calculation (min): 40 min    Insurance:  Visit number: 13   Approved number of visits: MN   Authorization not required after evaluation   Medicare + [employer]   Onset Date: 2023  Beginnin2023 Ending: 10/ 24/ 2023      General:  Reason for Visit: LE weakness.   Referred by: Dr. Brody      Current Problem  1. Muscle weakness            Precautions  Precautions Comment: none      Pain Assessment: 0-10  Pain Score: 5 - Moderate pain    Subjective:   Patient reports that he did well with his rollator on his trip to [place].    HEP Performed:  partially    Objective:   Gait: slight fwd flexed with wheeled walker    Treatments:   R. stepper, seat 8, L5, 5'  Standing hip abd x 15 B  Standing hip ext x 15 B  Heel raises x 15  Standing march x 2'  Fwd bosu lunges x 15 B  Sit to stand from 21 inch chair x 10- not today  4\" step ups x 10 B with B UE support  Cybex hip abd/add 25#, 15x2 each  HSC 30#, 12x2- not today  Leg press 60#, 10x2  Seated HS stretch 1' B.   Access Code: [email or web address]    Charges: Charges: TEx3    Assessment:   Patient demonstrated increased endurance with strengthening exercises today.    Plan:   Continue to increase LE strength, flexibility, balance, gait and endurance to tolerance for return to PLOF.     Melissa Rolle, PT  "

## 2023-10-24 ENCOUNTER — TREATMENT (OUTPATIENT)
Dept: PHYSICAL THERAPY | Facility: CLINIC | Age: 83
End: 2023-10-24
Payer: MEDICARE

## 2023-10-24 DIAGNOSIS — M62.81 MUSCLE WEAKNESS: Primary | ICD-10-CM

## 2023-10-24 PROCEDURE — 97110 THERAPEUTIC EXERCISES: CPT | Mod: GP

## 2023-10-24 ASSESSMENT — PAIN - FUNCTIONAL ASSESSMENT: PAIN_FUNCTIONAL_ASSESSMENT: 0-10

## 2023-10-24 ASSESSMENT — PAIN SCALES - GENERAL: PAINLEVEL_OUTOF10: 5 - MODERATE PAIN

## 2023-10-24 NOTE — TELEPHONE ENCOUNTER
Please call patient to let know that his blood count is slightly better - iron levels good (can stay off iron) and his inflammatory marker is still elevated - I would recommend we see him in 4-6 weeks to see how doing and determine if testing needed at that time    Thanks

## 2023-10-31 ENCOUNTER — TREATMENT (OUTPATIENT)
Dept: PHYSICAL THERAPY | Facility: CLINIC | Age: 83
End: 2023-10-31
Payer: MEDICARE

## 2023-10-31 DIAGNOSIS — M62.81 MUSCLE WEAKNESS: Primary | ICD-10-CM

## 2023-10-31 PROCEDURE — 97110 THERAPEUTIC EXERCISES: CPT | Mod: GP

## 2023-10-31 ASSESSMENT — PAIN - FUNCTIONAL ASSESSMENT: PAIN_FUNCTIONAL_ASSESSMENT: 0-10

## 2023-10-31 ASSESSMENT — PAIN SCALES - GENERAL: PAINLEVEL_OUTOF10: 5 - MODERATE PAIN

## 2023-11-04 PROBLEM — M81.0 OSTEOPOROSIS: Status: RESOLVED | Noted: 2023-05-05 | Resolved: 2023-11-04

## 2023-11-04 NOTE — PROGRESS NOTES
"Subjective   Patient ID: Dakota Pugh is a 83 y.o. male who presents for No chief complaint on file..    HPI  Last seen over one year ago for severe OSTEOPOROSIS  DEXA WITH T SCORES -4 OR >  MANY MEDICAL ISSUES IN 2023 AND IN REHAB FOR MANY MOS.   AT LAST VISIT SUGGESTED EVENITY FOLLOWED BY TODD    HAD 1 EVENITY ONLY  PERFORATED GASTRIC ULCER FEB WITH REHAB WITH DIFFICULTY WALKING ..  NEEDS WALKER   PT FOR 3 MOS NOW SARA   SAW ORTHOPED AND TOLD BONES FINE    PAIN  L>R THIGH IN \"MUSCLE\"  RODS IN BOTH LEGS AND QUIN THR         ROS      Current Outpatient Medications   Medication Sig Dispense Refill    acetaminophen (Tylenol Extra Strength) 500 mg tablet Take 1 tablet (500 mg) by mouth every 8 hours if needed for mild pain (1 - 3).      calcium carbonate (Oscal) 500 mg calcium (1,250 mg) tablet Take 1 tablet (1,250 mg) by mouth once daily.      calcium carbonate-vitamin D3 600 mg-5 mcg (200 unit) tablet Take by mouth twice a day.      cholecalciferol (Vitamin D-3) 25 MCG (1000 UT) tablet Take by mouth.      famotidine (Pepcid) 20 mg tablet Take 1 tablet (20 mg) by mouth once daily at bedtime. 90 tablet 3    fish oil concentrate (Omega-3) 120-180 mg capsule Take 1 capsule (1 g) by mouth once daily.      multivitamin (MULTIPLE VITAMINS ORAL) Take 1 tablet by mouth once daily.      omega-3 1,000 mg capsule capsule Take 1 capsule (1,000 mg) by mouth once daily.      ondansetron ODT (Zofran-ODT) 4 mg disintegrating tablet Take by mouth every 6 hours.      pantoprazole (ProtoNix) 40 mg EC tablet Take 1 tablet (40 mg) by mouth 2 times a day. Do not crush, chew, or split. 180 tablet 3    romosozumab (Evenity) 210mg/2.34mL ( 105mg/1.17mLx2) prefilled syringe every 30 (thirty) days. 2 subcutaneous vials in 2 different locations of body q 12 months      simvastatin (Zocor) 20 mg tablet Take 1 tablet (20 mg) by mouth once daily. 90 tablet 3    triamcinolone (Kenalog) 0.1 % cream APPLY TO RASH ON CHEST AND SHOULDERS TWICE " DAILY      vit A/vit C/vit E/zinc/copper (PRESERVISION AREDS ORAL) Take by mouth.       No current facility-administered medications for this visit.         has a past medical history of Dyslipidemia, Iron deficiency, Low BP, Osteoporosis, Other conditions influencing health status, Personal history of malignant neoplasm of bladder (09/13/2018), and Stomach ulcer.   Past Surgical History:   Procedure Laterality Date    BLADDER SURGERY  04/28/2015    Bladder Surgery    OTHER SURGICAL HISTORY  05/30/2020    Shoulder replacement    TONSILLECTOMY  12/11/2013    Tonsillectomy With Adenoidectomy    TOTAL HIP ARTHROPLASTY  12/11/2013    Total Hip Replacement      Social History     Tobacco Use    Smoking status: Never     Passive exposure: Past    Smokeless tobacco: Never   Substance Use Topics    Alcohol use: Yes     Comment: socially    Drug use: Not Currently      family history includes Asthma in his daughter; Breast cancer in his mother; Diabetes in his son; GORAN disease in his son; Glaucoma in his father; HTN in his son; Sleep apnea in his son.  Pain Management Panel           No data to display                 There were no vitals filed for this visit.  Lab Results   Component Value Date    RF <10 12/19/2019    SEDRATE 20 04/19/2022    CRP 0.85 09/09/2023    TSH 3.10 07/27/2023   7- 2023  GFR 53  H/H OK     PHYSICAL EXAM      NODES all stations negative  HEART  LUNGS  ABDOMEN   VASCULAR  NEURO normal muscle strength both proximally and distally of upper and lower extremity  SKIN  JOINTS no active synovitis    PLAN  Diagnoses and all orders for this visit:  Closed fracture of left femur, unspecified fracture morphology, unspecified portion of femur, sequela  Fracture of shaft of right femur with routine healing  Peptic ulcer disease with hemorrhage  Osteoporosis, unspecified osteoporosis type, unspecified pathological fracture presence    We discussed his chronic medical issues in some detail  He had a severe  gastrointestinal gastric perforation earlier this year with many complications and a long drawn out rehab.  He has made strides in his rehabilitation but continues to have reached a plateau in his ability to walk without the use of a walker    His symptoms are painful thighs bilaterally only with weightbearing.  He is comfortable sitting or lying down and the pain does not awaken him at night    He has seen an orthopedic surgeon whose note you can find in the community record however x-rays are not available  In that note the orthopedic surgeon can find no issues with his bilateral total hip replacements or rods and screws in both of his upper legs.    My examination today revealed normal muscle strength of his lower extremity  His gait was definitely abnormal and appeared that his iliopsoas muscles were still somewhat weak  I could not find any inflammatory muscle disease.  His CPK in February was normal    Regarding his osteoporosis Dr. Benavides had him stop the Evenity after 1 injection because of fears that it would affect his heart    I suggested that he consider Prolia and wants me to discuss this with Dr. Brody and Otilio and get their okay before any prescription is written  Someone will get back to him with the decision.

## 2023-11-07 ENCOUNTER — OFFICE VISIT (OUTPATIENT)
Dept: RHEUMATOLOGY | Facility: CLINIC | Age: 83
End: 2023-11-07
Payer: MEDICARE

## 2023-11-07 VITALS
SYSTOLIC BLOOD PRESSURE: 95 MMHG | DIASTOLIC BLOOD PRESSURE: 55 MMHG | HEART RATE: 57 BPM | WEIGHT: 175 LBS | BODY MASS INDEX: 24.5 KG/M2 | HEIGHT: 71 IN

## 2023-11-07 DIAGNOSIS — S72.301D FRACTURE OF SHAFT OF RIGHT FEMUR WITH ROUTINE HEALING: ICD-10-CM

## 2023-11-07 DIAGNOSIS — M81.0 OSTEOPOROSIS, UNSPECIFIED OSTEOPOROSIS TYPE, UNSPECIFIED PATHOLOGICAL FRACTURE PRESENCE: ICD-10-CM

## 2023-11-07 DIAGNOSIS — K27.4 PEPTIC ULCER DISEASE WITH HEMORRHAGE: ICD-10-CM

## 2023-11-07 DIAGNOSIS — S72.92XS CLOSED FRACTURE OF LEFT FEMUR, UNSPECIFIED FRACTURE MORPHOLOGY, UNSPECIFIED PORTION OF FEMUR, SEQUELA: Primary | ICD-10-CM

## 2023-11-07 PROCEDURE — 99215 OFFICE O/P EST HI 40 MIN: CPT | Performed by: INTERNAL MEDICINE

## 2023-11-07 PROCEDURE — 1126F AMNT PAIN NOTED NONE PRSNT: CPT | Performed by: INTERNAL MEDICINE

## 2023-11-07 PROCEDURE — 1159F MED LIST DOCD IN RCRD: CPT | Performed by: INTERNAL MEDICINE

## 2023-11-07 PROCEDURE — 1036F TOBACCO NON-USER: CPT | Performed by: INTERNAL MEDICINE

## 2023-11-13 ENCOUNTER — TELEPHONE (OUTPATIENT)
Dept: GASTROENTEROLOGY | Facility: CLINIC | Age: 83
End: 2023-11-13
Payer: MEDICARE

## 2023-11-13 DIAGNOSIS — M80.00XS OSTEOPOROSIS WITH CURRENT PATHOLOGICAL FRACTURE, UNSPECIFIED OSTEOPOROSIS TYPE, SEQUELA: Primary | ICD-10-CM

## 2023-11-14 DIAGNOSIS — M81.0 OSTEOPOROSIS, UNSPECIFIED OSTEOPOROSIS TYPE, UNSPECIFIED PATHOLOGICAL FRACTURE PRESENCE: Primary | ICD-10-CM

## 2023-11-14 RX ORDER — FAMOTIDINE 10 MG/ML
20 INJECTION INTRAVENOUS ONCE AS NEEDED
Status: CANCELLED | OUTPATIENT
Start: 2023-11-15

## 2023-11-14 RX ORDER — EPINEPHRINE 0.3 MG/.3ML
0.3 INJECTION SUBCUTANEOUS EVERY 5 MIN PRN
Status: CANCELLED | OUTPATIENT
Start: 2023-11-15

## 2023-11-14 RX ORDER — ALBUTEROL SULFATE 0.83 MG/ML
3 SOLUTION RESPIRATORY (INHALATION) AS NEEDED
Status: CANCELLED | OUTPATIENT
Start: 2023-11-15

## 2023-11-14 RX ORDER — DIPHENHYDRAMINE HYDROCHLORIDE 50 MG/ML
50 INJECTION INTRAMUSCULAR; INTRAVENOUS AS NEEDED
Status: CANCELLED | OUTPATIENT
Start: 2023-11-15

## 2023-11-15 ENCOUNTER — DOCUMENTATION (OUTPATIENT)
Dept: INFUSION THERAPY | Facility: CLINIC | Age: 83
End: 2023-11-15
Payer: MEDICARE

## 2023-11-15 NOTE — PROGRESS NOTES
CLINICAL CLEARANCE FOR OP INJECTION    Patient to be scheduled for new start of Prolia injections.  For Diagnosis: Osteoporosis  Labs..  CMP / Calcium drawn on: 7.27.23 (repeat within 28 days of injection)  Serum Calcium Results: 9.3 at that time  (>8.6mg/dl or ionized calcium WNL.  Hold / Contact provider if <8.6)  Calcium and Vitamin D supplement on medication list? Yes   (if no nurse to encourage discussion of supplementation at visit)  No obvious recent dental work per chart review. Nurse to confirm no dental within past/next 4 weeks at encounter.  Due: anytime     This result meets treatment criteria. Ok to schedule for prolia; please instruct pt to have labs drawn no more than 28 days prior to their scheduled appointment   Patient is scheduled 1/16/24

## 2023-12-04 ENCOUNTER — LAB (OUTPATIENT)
Dept: LAB | Facility: LAB | Age: 83
End: 2023-12-04
Payer: MEDICARE

## 2023-12-04 DIAGNOSIS — D63.1 ANEMIA DUE TO STAGE 3A CHRONIC KIDNEY DISEASE (MULTI): ICD-10-CM

## 2023-12-04 DIAGNOSIS — E61.1 IRON DEFICIENCY: ICD-10-CM

## 2023-12-04 DIAGNOSIS — N18.31 ANEMIA DUE TO STAGE 3A CHRONIC KIDNEY DISEASE (MULTI): ICD-10-CM

## 2023-12-04 PROCEDURE — 36415 COLL VENOUS BLD VENIPUNCTURE: CPT

## 2023-12-04 PROCEDURE — 82728 ASSAY OF FERRITIN: CPT

## 2023-12-04 PROCEDURE — 83540 ASSAY OF IRON: CPT

## 2023-12-04 PROCEDURE — 83550 IRON BINDING TEST: CPT

## 2023-12-04 PROCEDURE — 85025 COMPLETE CBC W/AUTO DIFF WBC: CPT

## 2023-12-05 ENCOUNTER — OFFICE VISIT (OUTPATIENT)
Dept: PRIMARY CARE | Facility: CLINIC | Age: 83
End: 2023-12-05
Payer: MEDICARE

## 2023-12-05 VITALS
TEMPERATURE: 97.3 F | HEART RATE: 62 BPM | SYSTOLIC BLOOD PRESSURE: 104 MMHG | OXYGEN SATURATION: 94 % | DIASTOLIC BLOOD PRESSURE: 50 MMHG

## 2023-12-05 DIAGNOSIS — N18.31 ANEMIA DUE TO STAGE 3A CHRONIC KIDNEY DISEASE (MULTI): Primary | ICD-10-CM

## 2023-12-05 DIAGNOSIS — D63.1 ANEMIA DUE TO STAGE 3A CHRONIC KIDNEY DISEASE (MULTI): Primary | ICD-10-CM

## 2023-12-05 DIAGNOSIS — E61.1 IRON DEFICIENCY: ICD-10-CM

## 2023-12-05 DIAGNOSIS — R79.89 ELEVATED FERRITIN: ICD-10-CM

## 2023-12-05 LAB
BASOPHILS # BLD AUTO: 0.05 X10*3/UL (ref 0–0.1)
BASOPHILS NFR BLD AUTO: 0.8 %
EOSINOPHIL # BLD AUTO: 0.32 X10*3/UL (ref 0–0.4)
EOSINOPHIL NFR BLD AUTO: 5 %
ERYTHROCYTE [DISTWIDTH] IN BLOOD BY AUTOMATED COUNT: 14.2 % (ref 11.5–14.5)
FERRITIN SERPL-MCNC: 444 NG/ML (ref 20–300)
HCT VFR BLD AUTO: 36 % (ref 41–52)
HGB BLD-MCNC: 11.7 G/DL (ref 13.5–17.5)
IMM GRANULOCYTES # BLD AUTO: 0.02 X10*3/UL (ref 0–0.5)
IMM GRANULOCYTES NFR BLD AUTO: 0.3 % (ref 0–0.9)
IRON SATN MFR SERPL: 31 % (ref 25–45)
IRON SERPL-MCNC: 70 UG/DL (ref 35–150)
LYMPHOCYTES # BLD AUTO: 2.14 X10*3/UL (ref 0.8–3)
LYMPHOCYTES NFR BLD AUTO: 33.2 %
MCH RBC QN AUTO: 29.7 PG (ref 26–34)
MCHC RBC AUTO-ENTMCNC: 32.5 G/DL (ref 32–36)
MCV RBC AUTO: 91 FL (ref 80–100)
MONOCYTES # BLD AUTO: 0.44 X10*3/UL (ref 0.05–0.8)
MONOCYTES NFR BLD AUTO: 6.8 %
NEUTROPHILS # BLD AUTO: 3.48 X10*3/UL (ref 1.6–5.5)
NEUTROPHILS NFR BLD AUTO: 53.9 %
NRBC BLD-RTO: 0 /100 WBCS (ref 0–0)
PLATELET # BLD AUTO: 212 X10*3/UL (ref 150–450)
RBC # BLD AUTO: 3.94 X10*6/UL (ref 4.5–5.9)
TIBC SERPL-MCNC: 225 UG/DL (ref 240–445)
UIBC SERPL-MCNC: 155 UG/DL (ref 110–370)
WBC # BLD AUTO: 6.5 X10*3/UL (ref 4.4–11.3)

## 2023-12-05 PROCEDURE — 1125F AMNT PAIN NOTED PAIN PRSNT: CPT | Performed by: FAMILY MEDICINE

## 2023-12-05 PROCEDURE — 1036F TOBACCO NON-USER: CPT | Performed by: FAMILY MEDICINE

## 2023-12-05 PROCEDURE — 99214 OFFICE O/P EST MOD 30 MIN: CPT | Performed by: FAMILY MEDICINE

## 2023-12-05 PROCEDURE — 1159F MED LIST DOCD IN RCRD: CPT | Performed by: FAMILY MEDICINE

## 2023-12-05 ASSESSMENT — PAIN SCALES - GENERAL: PAINLEVEL: 3

## 2023-12-05 NOTE — PROGRESS NOTES
Subjective   Patient ID: Dakota Pugh is a 83 y.o. male who presents for Follow-up.  HPI  Different quality pain in leg - feels more muscle pain in legs    Can stand with a walker for a longer period   Working to build more strength  Recent lab tests stable/okay  His ferritin has dropped further which is reassuring as an inflammatory marker  Still with anemia and likely having some impact on his overall energy  No obvious bruising or bleeding or change in stool character to suggest GI bleeding concerns  Continues on acid reducers  He will be getting bone building medication per rheumatology    Review of Systems  Essentially noncontributory otherwise    Objective   /50 (BP Location: Left arm, Patient Position: Sitting, BP Cuff Size: Adult)   Pulse 62   Temp 36.3 °C (97.3 °F) (Temporal)   SpO2 94%     Physical Exam  General: No acute distress, appears pale as before  HEENT: No conjunctival injection or scleral icterus  Neck: No gross thyromegaly or JVD  Cardiac: Regular rate and rhythm, normal S1 and S2  Extremities: Appear well-perfused  Neuro: Ambulates with use of walker, no gross tremulousness  Assessment/Plan   Problem List Items Addressed This Visit       Anemia - Primary    Relevant Orders    CBC and Auto Differential (Completed)    Iron and TIBC (Completed)    Ferritin (Completed)    Comprehensive metabolic panel     Other Visit Diagnoses       Iron deficiency        Relevant Orders    CBC and Auto Differential (Completed)    Iron and TIBC (Completed)    Ferritin (Completed)    Elevated ferritin            Making some modest progress, overall would reassure  Follow-up treatment with rheumatology as recommended  Plan to see again in 4 to 6 weeks

## 2024-01-03 ENCOUNTER — LAB (OUTPATIENT)
Dept: LAB | Facility: LAB | Age: 84
End: 2024-01-03
Payer: MEDICARE

## 2024-01-03 DIAGNOSIS — N18.31 ANEMIA DUE TO STAGE 3A CHRONIC KIDNEY DISEASE (MULTI): ICD-10-CM

## 2024-01-03 DIAGNOSIS — D63.1 ANEMIA DUE TO STAGE 3A CHRONIC KIDNEY DISEASE (MULTI): ICD-10-CM

## 2024-01-03 DIAGNOSIS — R74.8 ABNORMAL LIVER ENZYMES: ICD-10-CM

## 2024-01-03 LAB
ALBUMIN SERPL BCP-MCNC: 4.1 G/DL (ref 3.4–5)
ALP SERPL-CCNC: 154 U/L (ref 33–136)
ALT SERPL W P-5'-P-CCNC: 23 U/L (ref 10–52)
ANION GAP SERPL CALC-SCNC: 12 MMOL/L (ref 10–20)
AST SERPL W P-5'-P-CCNC: 26 U/L (ref 9–39)
BILIRUB SERPL-MCNC: 0.5 MG/DL (ref 0–1.2)
BUN SERPL-MCNC: 25 MG/DL (ref 6–23)
CALCIUM SERPL-MCNC: 9.3 MG/DL (ref 8.6–10.6)
CHLORIDE SERPL-SCNC: 104 MMOL/L (ref 98–107)
CO2 SERPL-SCNC: 28 MMOL/L (ref 21–32)
CREAT SERPL-MCNC: 1.19 MG/DL (ref 0.5–1.3)
GFR SERPL CREATININE-BSD FRML MDRD: 61 ML/MIN/1.73M*2
GLUCOSE SERPL-MCNC: 86 MG/DL (ref 74–99)
POTASSIUM SERPL-SCNC: 4.5 MMOL/L (ref 3.5–5.3)
PROT SERPL-MCNC: 6.7 G/DL (ref 6.4–8.2)
SODIUM SERPL-SCNC: 139 MMOL/L (ref 136–145)

## 2024-01-03 PROCEDURE — 36415 COLL VENOUS BLD VENIPUNCTURE: CPT

## 2024-01-03 PROCEDURE — 80076 HEPATIC FUNCTION PANEL: CPT

## 2024-01-03 PROCEDURE — 80053 COMPREHEN METABOLIC PANEL: CPT

## 2024-01-03 PROCEDURE — 82977 ASSAY OF GGT: CPT

## 2024-01-04 DIAGNOSIS — R74.8 ABNORMAL LIVER ENZYMES: Primary | ICD-10-CM

## 2024-01-04 LAB
ALBUMIN SERPL BCP-MCNC: 4.1 G/DL (ref 3.4–5)
ALP SERPL-CCNC: 151 U/L (ref 33–136)
ALT SERPL W P-5'-P-CCNC: 22 U/L (ref 10–52)
AST SERPL W P-5'-P-CCNC: 26 U/L (ref 9–39)
BILIRUB DIRECT SERPL-MCNC: 0.1 MG/DL (ref 0–0.3)
BILIRUB SERPL-MCNC: 0.5 MG/DL (ref 0–1.2)
GGT SERPL-CCNC: 50 U/L (ref 5–64)
PROT SERPL-MCNC: 6.6 G/DL (ref 6.4–8.2)

## 2024-01-04 NOTE — RESULT ENCOUNTER NOTE
Told him the elevated alkaline phosphatase could be bone or could be liver but that it would be prudent to follow-up.  Put in an order for a -  Hepatic panel  GGT  Heat fractionation alkaline phosphatase.    He will probably do that follow-up next week.

## 2024-01-16 ENCOUNTER — INFUSION (OUTPATIENT)
Dept: INFUSION THERAPY | Facility: CLINIC | Age: 84
End: 2024-01-16
Payer: MEDICARE

## 2024-01-16 VITALS
SYSTOLIC BLOOD PRESSURE: 116 MMHG | BODY MASS INDEX: 26.29 KG/M2 | RESPIRATION RATE: 18 BRPM | HEART RATE: 64 BPM | TEMPERATURE: 97.5 F | DIASTOLIC BLOOD PRESSURE: 69 MMHG | OXYGEN SATURATION: 92 % | WEIGHT: 185.85 LBS

## 2024-01-16 DIAGNOSIS — M81.0 OSTEOPOROSIS, UNSPECIFIED OSTEOPOROSIS TYPE, UNSPECIFIED PATHOLOGICAL FRACTURE PRESENCE: ICD-10-CM

## 2024-01-16 PROCEDURE — 96372 THER/PROPH/DIAG INJ SC/IM: CPT | Performed by: NURSE PRACTITIONER

## 2024-01-16 RX ORDER — FAMOTIDINE 10 MG/ML
20 INJECTION INTRAVENOUS ONCE AS NEEDED
OUTPATIENT
Start: 2024-07-14

## 2024-01-16 RX ORDER — ALBUTEROL SULFATE 0.83 MG/ML
3 SOLUTION RESPIRATORY (INHALATION) AS NEEDED
OUTPATIENT
Start: 2024-07-14

## 2024-01-16 RX ORDER — EPINEPHRINE 0.3 MG/.3ML
0.3 INJECTION SUBCUTANEOUS EVERY 5 MIN PRN
OUTPATIENT
Start: 2024-07-14

## 2024-01-16 RX ORDER — DIPHENHYDRAMINE HYDROCHLORIDE 50 MG/ML
50 INJECTION INTRAMUSCULAR; INTRAVENOUS AS NEEDED
OUTPATIENT
Start: 2024-07-14

## 2024-01-16 ASSESSMENT — ENCOUNTER SYMPTOMS
SHORTNESS OF BREATH: 0
CHEST TIGHTNESS: 0
UNEXPECTED WEIGHT CHANGE: 0
APPETITE CHANGE: 0
SLEEP DISTURBANCE: 1
WHEEZING: 0
LEG SWELLING: 0
CONSTIPATION: 0
EXTREMITY WEAKNESS: 0
FATIGUE: 0
DEPRESSION: 0
WOUND: 0
HEADACHES: 0
DIZZINESS: 0
NAUSEA: 0
PALPITATIONS: 0
NERVOUS/ANXIOUS: 0
SORE THROAT: 0
VOMITING: 0
CHILLS: 0
DYSURIA: 0
EYE PROBLEMS: 0
LIGHT-HEADEDNESS: 0
ABDOMINAL PAIN: 0
ARTHRALGIAS: 0
TROUBLE SWALLOWING: 0
FEVER: 0
COUGH: 0
HEMATURIA: 0
MYALGIAS: 0
DIARRHEA: 0
FREQUENCY: 0
BLOOD IN STOOL: 0
NUMBNESS: 0

## 2024-01-16 NOTE — PROGRESS NOTES
[facility]   infusion Clinic Note   Date: 2024   Name: Dakota Pugh  : 1940   MRN: [id number]         Reason for Visit: New Patient Visit (1st Prolia 60mg subcutaneous injection)         Visit Type: INJECTION      Ordered By:  Cristi Verma MD      Accompanied by:Wife      Diagnosis: Osteoporosis, unspecified osteoporosis type, unspecified pathological fracture presence       Allergies:   Allergies as of 2024    (No Known Allergies)         Current Medications has a current medication list which includes the following prescription(s): acetaminophen, calcium carbonate, calcium carbonate-vitamin d3, cholecalciferol, denosumab, famotidine, fish oil concentrate, multivitamin, omega-3, ondansetron odt, pantoprazole, evenity, simvastatin, triamcinolone, and vit a/vit c/vit e/zinc/copper.       Vitals:  Vitals:    24 1407   BP: 116/69   BP Location: Left arm   Patient Position: Sitting   BP Cuff Size: Adult   Pulse: 64   Resp: 18   Temp: 36.4 °C (97.5 °F)   TempSrc: Temporal   SpO2: 92%   Weight: 84.3 kg (185 lb 13.6 oz)             Infusion Pre-procedure Checklist:   - Allergies reviewed: yes   - Medications reviewed: yes       - Previous reaction to current treatment: n/a      Assess patient for the concerns below. Document provider notification as appropriate.  - Active or recent infection with/without current antibiotic use: no  - Recent or planned invasive dental work: no  - Recent or planned surgeries: no  - Recently received or plans to receive vaccinations: no  - Has treatment related toxicities: n/a  - Is pregnant:  n/a      Pain: 3   - Is the pain different from normal: no   - Is the pain tolerable: yes   - Is your Doctor aware:  yes               Fall Risk Screening: Rhiannon Fall Risk  History of Falling, Immediate or Within 3 Months: Yes (, Had a shooting pain in leg and lost balance)  Ambulatory Aid: Crutches/cane/walker (Uses wheeled  walker)  Intravenous Therapy/Heparin Lock: No  Gait/Transferring: Impaired   Mental Status: Oriented to own ability       Review Of Systems:  Review of Systems   Constitutional:  Negative for appetite change, chills, fatigue, fever and unexpected weight change.   HENT:   Negative for hearing loss, mouth sores, sore throat, tinnitus and trouble swallowing.    Eyes:  Negative for eye problems.        Wears glasses   Respiratory:  Negative for chest tightness, cough, shortness of breath and wheezing.    Cardiovascular:  Negative for chest pain, leg swelling and palpitations.   Gastrointestinal:  Negative for abdominal pain, blood in stool, constipation, diarrhea, nausea and vomiting.   Genitourinary:  Negative for dysuria, frequency and hematuria.    Musculoskeletal:  Positive for gait problem. Negative for arthralgias and myalgias.        3/10, chronic, bilateral legs,   Skin:  Negative for itching, rash and wound.   Neurological:  Positive for gait problem. Negative for dizziness, extremity weakness, headaches, light-headedness and numbness.        Due to pain in legs   Psychiatric/Behavioral:  Positive for sleep disturbance. Negative for depression. The patient is not nervous/anxious.         Wakes up during the night to go to the bathroom every 2-3- hours         Infusion Readiness:   - Assessment Concerns Related to Infusion: No  - Provider notified: n/a      Document Below Only If Indicated:   New Patient Education:    NEW PATIENT MEDICATION EDUCATION PT PROVIDED WITH WRITTEN ([employer] PT EDUCATION SHEET) AND VERBAL EDUCATION REGARDING MEDICATION GIVEN. VERIFIED MEDICATION NAME WITH PATIENT AND DISCUSSED REASON FOR USE. BRIEFLY DISCUSSED HOW MEDICATION WORKS AND EDUCATED ON GOAL OF TREATMENT, FREQUENCY OF TREATMENT, ADVERSE RXN'S AND COMMON SIDE EFFECTS TO MONITOR FOR. INSTRUCTED PT TO ASSURE THAT ALL PROVIDERS INCLUDING DENTISTS ARE AWARE OF MEDICATION RECEIVED. DISCUSSED FLOW OF VISIT AND ORIENTED TO INFUSION  CENTER. PT VERBALIZES UNDERSTANDING. CALL LIGHT PROVIDED AND PT AWARE TO ALERT STAFF OF ANY CONCERNS DURING TREATMENT.        Treatment Conditions & Drug Specific Questions:    Denosumab  (PROLIA. XGEVA)    (Unless otherwise specified on patient specific therapy plan):     TREATMENT CONDITIONS:  Unless otherwise specified on patient specific therapy plan HOLD and notify provider prior to proceeding with today's injection if patients:  o Calcium is LESS THAN 8.6 mg/dL OR  Ionized Calcium LESS THAN 1.1 mmol/L  o Recent or planned invasive dental procedure (within 4 weeks)    Lab Results   Component Value Date    CALCIUM 9.3 01/03/2024    PHOS 3.3 03/21/2023      Lab Results   Component Value Date    IONCALVEN 1.15 02/02/2023        Labs reviewed and patient meets treatment conditions? Yes    DRUG SPECIFIC QUESTIONS:  Is the patient taking calcium and vitamin D? Yes  (Recommended)    Pt Instructed on following risks: (1) hypocalcemia, (2) osteonecrosis of the jaw, (3) atypical femoral fractures, (4) serious infections, and (5) dermatologic reactions?  Yes      REMINDER:  REMS DRUG    Recommended Vitals/Observation:  Vitals: Obtain vitals prior to injection.  Observation: Patient may leave immediately following injection.        Weight Based Drug Calculations:    WEIGHT BASED DRUGS: NOT APPLICABLE / FLAT DOSE          Initiated By: Raquel Hartley LPN   Time: 2:54 PM     We administered denosumab.

## 2024-01-16 NOTE — PATIENT INSTRUCTIONS
Today :We administered denosumab.  1st Prolia 60mg subcutaneous injection left upper arm  Blood calcium with 28 days of next Prolia appointment  For:   1. Osteoporosis, unspecified osteoporosis type, unspecified pathological fracture presence       Your next appointment is due in:  6 months       Please read the  Medication Guide that was given to you and reviewed during todays visit.     (Tell all doctors including dentists that you are taking this medication)     Go to the emergency room or call 911 if:  -You have signs of allergic reaction:   -Rash, hives, itching.   -Swollen, blistered, peeling skin.   -Swelling of face, lips, mouth, tongue or throat.   -Tightness of chest, trouble breathing, swallowing or talking     Call your doctor:  - If injection site gets red, warm, swollen, itchy or leaks fluid or pus.     (Leave band aid on your injection  site for at least 2 hours and keep area clean and dry  - If you get sick or have symptoms of infection or are not feeling well for any reason.    (Wash your hands often, stay away from people who are sick)  - If you have side effects from your medication that do not go away or are bothersome.     (Refer to the teaching your nurse gave you for side effects to call your doctor about)    - Common side effects may include:  stuffy nose, headache, feeling tired, muscle aches, upset stomach  - Before receiving any vaccines     - Call the [facility] at   If:  - You get sick, are on antibiotics, have had a recent vaccine, have surgery or dental work and your doctor wants your visit rescheduled.  - You need to cancel and reschedule your visit for any reason. Call at least 2 days before your visit if you need to cancel.   - Your insurance changes before your next visit.    (We will need to get approval from your new insurance. This can take up to two weeks.)     The [facility] is opened Monday thru Friday. We are closed on weekends and  holidays.   Voice mail will take your call if the center is closed. If you leave a message please allow 24 hours for a call back during weekdays. If you leave a message on a weekend/holiday, we will call you back the next business day.

## 2024-04-25 ENCOUNTER — TELEPHONE (OUTPATIENT)
Dept: PRIMARY CARE | Facility: CLINIC | Age: 84
End: 2024-04-25
Payer: MEDICARE

## 2024-04-25 NOTE — TELEPHONE ENCOUNTER
Called and discussed   No fever - sig hoarseness and runny nose that was preceded by sore throat - slight cough that is mostly dry - some nasal drainage - no marked sinus pressure - started yesterday   Suggested fluid increase and consider a trial of plain loratadine  Update us as need be

## 2024-04-25 NOTE — TELEPHONE ENCOUNTER
Patient has a bad case of laryngitis and is feeling pretty crappy.   He is calling to find out what he should/could take given his stomach problems.  Call to discuss.

## 2024-06-21 DIAGNOSIS — K21.9 GASTROESOPHAGEAL REFLUX DISEASE, UNSPECIFIED WHETHER ESOPHAGITIS PRESENT: ICD-10-CM

## 2024-06-21 RX ORDER — PANTOPRAZOLE SODIUM 40 MG/1
40 TABLET, DELAYED RELEASE ORAL 2 TIMES DAILY
Qty: 180 TABLET | Refills: 3 | Status: SHIPPED | OUTPATIENT
Start: 2024-06-21

## 2024-07-01 DIAGNOSIS — Z00.00 HEALTHCARE MAINTENANCE: ICD-10-CM

## 2024-07-02 ENCOUNTER — LAB (OUTPATIENT)
Dept: LAB | Facility: LAB | Age: 84
End: 2024-07-02
Payer: MEDICARE

## 2024-07-02 DIAGNOSIS — Z00.00 HEALTHCARE MAINTENANCE: ICD-10-CM

## 2024-07-02 DIAGNOSIS — M81.0 OSTEOPOROSIS, UNSPECIFIED OSTEOPOROSIS TYPE, UNSPECIFIED PATHOLOGICAL FRACTURE PRESENCE: ICD-10-CM

## 2024-07-02 LAB
25(OH)D3 SERPL-MCNC: 67 NG/ML (ref 30–100)
ALBUMIN SERPL BCP-MCNC: 4.4 G/DL (ref 3.4–5)
ALP SERPL-CCNC: 106 U/L (ref 33–136)
ALT SERPL W P-5'-P-CCNC: 30 U/L (ref 10–52)
ANION GAP SERPL CALC-SCNC: 11 MMOL/L (ref 10–20)
APPEARANCE UR: CLEAR
AST SERPL W P-5'-P-CCNC: 38 U/L (ref 9–39)
BASOPHILS # BLD AUTO: 0.06 X10*3/UL (ref 0–0.1)
BASOPHILS NFR BLD AUTO: 0.9 %
BILIRUB SERPL-MCNC: 0.7 MG/DL (ref 0–1.2)
BILIRUB UR STRIP.AUTO-MCNC: NEGATIVE MG/DL
BUN SERPL-MCNC: 22 MG/DL (ref 6–23)
CALCIUM SERPL-MCNC: 8.9 MG/DL (ref 8.6–10.6)
CHLORIDE SERPL-SCNC: 101 MMOL/L (ref 98–107)
CHOLEST SERPL-MCNC: 242 MG/DL (ref 0–199)
CHOLESTEROL/HDL RATIO: 7.2
CO2 SERPL-SCNC: 29 MMOL/L (ref 21–32)
COLOR UR: ABNORMAL
CREAT SERPL-MCNC: 1.18 MG/DL (ref 0.5–1.3)
CRP SERPL HS-MCNC: 4 MG/L
EGFRCR SERPLBLD CKD-EPI 2021: 61 ML/MIN/1.73M*2
EOSINOPHIL # BLD AUTO: 0.26 X10*3/UL (ref 0–0.4)
EOSINOPHIL NFR BLD AUTO: 4.1 %
ERYTHROCYTE [DISTWIDTH] IN BLOOD BY AUTOMATED COUNT: 14.5 % (ref 11.5–14.5)
EST. AVERAGE GLUCOSE BLD GHB EST-MCNC: 103 MG/DL
GLUCOSE SERPL-MCNC: 90 MG/DL (ref 74–99)
GLUCOSE UR STRIP.AUTO-MCNC: NORMAL MG/DL
HBA1C MFR BLD: 5.2 %
HCT VFR BLD AUTO: 42.6 % (ref 41–52)
HDLC SERPL-MCNC: 33.6 MG/DL
HGB BLD-MCNC: 13.7 G/DL (ref 13.5–17.5)
HOLD SPECIMEN: NORMAL
IMM GRANULOCYTES # BLD AUTO: 0.02 X10*3/UL (ref 0–0.5)
IMM GRANULOCYTES NFR BLD AUTO: 0.3 % (ref 0–0.9)
KETONES UR STRIP.AUTO-MCNC: NEGATIVE MG/DL
LDLC SERPL CALC-MCNC: 177 MG/DL
LEUKOCYTE ESTERASE UR QL STRIP.AUTO: ABNORMAL
LYMPHOCYTES # BLD AUTO: 2.88 X10*3/UL (ref 0.8–3)
LYMPHOCYTES NFR BLD AUTO: 45.6 %
MCH RBC QN AUTO: 28.8 PG (ref 26–34)
MCHC RBC AUTO-ENTMCNC: 32.2 G/DL (ref 32–36)
MCV RBC AUTO: 90 FL (ref 80–100)
MONOCYTES # BLD AUTO: 0.41 X10*3/UL (ref 0.05–0.8)
MONOCYTES NFR BLD AUTO: 6.5 %
MUCOUS THREADS #/AREA URNS AUTO: ABNORMAL /LPF
NEUTROPHILS # BLD AUTO: 2.69 X10*3/UL (ref 1.6–5.5)
NEUTROPHILS NFR BLD AUTO: 42.6 %
NITRITE UR QL STRIP.AUTO: NEGATIVE
NON HDL CHOLESTEROL: 208 MG/DL (ref 0–149)
NRBC BLD-RTO: 0 /100 WBCS (ref 0–0)
PH UR STRIP.AUTO: 6 [PH]
PLATELET # BLD AUTO: 214 X10*3/UL (ref 150–450)
POTASSIUM SERPL-SCNC: 4.2 MMOL/L (ref 3.5–5.3)
PROT SERPL-MCNC: 7.1 G/DL (ref 6.4–8.2)
PROT UR STRIP.AUTO-MCNC: NEGATIVE MG/DL
PSA SERPL-MCNC: 0.21 NG/ML
RBC # BLD AUTO: 4.75 X10*6/UL (ref 4.5–5.9)
RBC # UR STRIP.AUTO: NEGATIVE /UL
RBC #/AREA URNS AUTO: ABNORMAL /HPF
SODIUM SERPL-SCNC: 137 MMOL/L (ref 136–145)
SP GR UR STRIP.AUTO: 1.02
TRIGL SERPL-MCNC: 159 MG/DL (ref 0–149)
TSH SERPL-ACNC: 1.94 MIU/L (ref 0.44–3.98)
UROBILINOGEN UR STRIP.AUTO-MCNC: NORMAL MG/DL
VLDL: 32 MG/DL (ref 0–40)
WBC # BLD AUTO: 6.3 X10*3/UL (ref 4.4–11.3)
WBC #/AREA URNS AUTO: ABNORMAL /HPF
YEAST BUDDING #/AREA UR COMP ASSIST: PRESENT /HPF

## 2024-07-02 PROCEDURE — 83036 HEMOGLOBIN GLYCOSYLATED A1C: CPT

## 2024-07-02 PROCEDURE — 84153 ASSAY OF PSA TOTAL: CPT

## 2024-07-02 PROCEDURE — 86141 C-REACTIVE PROTEIN HS: CPT

## 2024-07-02 PROCEDURE — 85025 COMPLETE CBC W/AUTO DIFF WBC: CPT

## 2024-07-02 PROCEDURE — 80053 COMPREHEN METABOLIC PANEL: CPT

## 2024-07-02 PROCEDURE — 87086 URINE CULTURE/COLONY COUNT: CPT

## 2024-07-02 PROCEDURE — 80061 LIPID PANEL: CPT

## 2024-07-02 PROCEDURE — 36415 COLL VENOUS BLD VENIPUNCTURE: CPT

## 2024-07-02 PROCEDURE — 81001 URINALYSIS AUTO W/SCOPE: CPT

## 2024-07-02 PROCEDURE — 84443 ASSAY THYROID STIM HORMONE: CPT

## 2024-07-02 PROCEDURE — 82306 VITAMIN D 25 HYDROXY: CPT

## 2024-07-05 ENCOUNTER — TELEPHONE (OUTPATIENT)
Dept: PRIMARY CARE | Facility: CLINIC | Age: 84
End: 2024-07-05
Payer: MEDICARE

## 2024-07-05 DIAGNOSIS — R33.9 URINARY RETENTION: ICD-10-CM

## 2024-07-05 DIAGNOSIS — N39.0 URINARY TRACT INFECTION WITHOUT HEMATURIA, SITE UNSPECIFIED: Primary | ICD-10-CM

## 2024-07-05 LAB — BACTERIA UR CULT: ABNORMAL

## 2024-07-05 RX ORDER — NITROFURANTOIN 25; 75 MG/1; MG/1
100 CAPSULE ORAL 2 TIMES DAILY
Qty: 10 CAPSULE | Refills: 0 | Status: SHIPPED | OUTPATIENT
Start: 2024-07-05 | End: 2024-07-10

## 2024-07-15 NOTE — TELEPHONE ENCOUNTER
Had reached out to patient over a week ago regarding positive urine culture  Was not really reporting any significant symptoms, but given his problems clearing the infection in the past thought it was appropriate to start him on treatment for 10 days    Requested Prescriptions     Signed Prescriptions Disp Refills    nitrofurantoin, macrocrystal-monohydrate, (Macrobid) 100 mg capsule 10 capsule 0     Sig: Take 1 capsule (100 mg) by mouth 2 times a day for 5 days.     Authorizing Provider: ARMAND FINCH

## 2024-07-16 ENCOUNTER — APPOINTMENT (OUTPATIENT)
Dept: INFUSION THERAPY | Facility: CLINIC | Age: 84
End: 2024-07-16
Payer: MEDICARE

## 2024-07-16 VITALS
SYSTOLIC BLOOD PRESSURE: 111 MMHG | DIASTOLIC BLOOD PRESSURE: 68 MMHG | WEIGHT: 181.55 LBS | HEART RATE: 58 BPM | OXYGEN SATURATION: 96 % | RESPIRATION RATE: 16 BRPM | TEMPERATURE: 97.5 F | BODY MASS INDEX: 25.68 KG/M2

## 2024-07-16 DIAGNOSIS — M81.0 OSTEOPOROSIS, UNSPECIFIED OSTEOPOROSIS TYPE, UNSPECIFIED PATHOLOGICAL FRACTURE PRESENCE: ICD-10-CM

## 2024-07-16 PROCEDURE — 96372 THER/PROPH/DIAG INJ SC/IM: CPT | Performed by: NURSE PRACTITIONER

## 2024-07-16 RX ORDER — EPINEPHRINE 0.3 MG/.3ML
0.3 INJECTION SUBCUTANEOUS EVERY 5 MIN PRN
OUTPATIENT
Start: 2025-01-10

## 2024-07-16 RX ORDER — FAMOTIDINE 10 MG/ML
20 INJECTION INTRAVENOUS ONCE AS NEEDED
OUTPATIENT
Start: 2025-01-10

## 2024-07-16 RX ORDER — DIPHENHYDRAMINE HYDROCHLORIDE 50 MG/ML
50 INJECTION INTRAMUSCULAR; INTRAVENOUS AS NEEDED
OUTPATIENT
Start: 2025-01-10

## 2024-07-16 RX ORDER — ALBUTEROL SULFATE 0.83 MG/ML
3 SOLUTION RESPIRATORY (INHALATION) AS NEEDED
OUTPATIENT
Start: 2025-01-10

## 2024-07-16 ASSESSMENT — ENCOUNTER SYMPTOMS
WHEEZING: 0
NAUSEA: 0
CONSTIPATION: 0
EXTREMITY WEAKNESS: 0
SORE THROAT: 0
WOUND: 0
DIZZINESS: 0
CHILLS: 0
SHORTNESS OF BREATH: 0
HEADACHES: 0
DIARRHEA: 0
CONFUSION: 0
CHEST TIGHTNESS: 0
FEVER: 0
TROUBLE SWALLOWING: 0
MYALGIAS: 0
FREQUENCY: 0
ARTHRALGIAS: 0
EYE PROBLEMS: 0
LEG SWELLING: 0
LIGHT-HEADEDNESS: 0
NERVOUS/ANXIOUS: 0
DEPRESSION: 0
COUGH: 0
NUMBNESS: 0
HEMATURIA: 0
DYSURIA: 0
PALPITATIONS: 0
BRUISES/BLEEDS EASILY: 1
FATIGUE: 0
VOMITING: 0
SLEEP DISTURBANCE: 0
ABDOMINAL PAIN: 0

## 2024-07-16 NOTE — PROGRESS NOTES
[facility]   Infusion Clinic Note   Date: 2024   Name: Dakota Pugh  : 1940   MRN: [id number]         Reason for Visit: Injections (Every 6 months Prolia 60mg subcutaneous injection)         Today: We administered denosumab.       Visit Type: INJECTION       Ordered By: Cristi Verma DO      Accompanied by: Wife      Diagnosis: Osteoporosis, unspecified osteoporosis type, unspecified pathological fracture presence       Allergies:   Allergies as of 2024    (No Known Allergies)         Current Medications has a current medication list which includes the following prescription(s): acetaminophen, calcium carbonate, calcium carbonate-vitamin d3, cholecalciferol, denosumab, famotidine, fish oil concentrate, multivitamin, omega-3, ondansetron odt, pantoprazole, evenity, simvastatin, triamcinolone, and vit a/vit c/vit e/zinc/copper.       Vitals:   Vitals:    24 1313   BP: 111/68   Pulse: 58   Resp: 16   Temp: 36.4 °C (97.5 °F)   SpO2: 96%   Weight: 82.3 kg (181 lb 8.8 oz)             Infusion Pre-procedure Checklist:   - Allergies reviewed: yes   - Medications reviewed: yes       - Previous reaction to current treatment: no      Assess patient for the concerns below. Document provider notification as appropriate.  - Active or recent infection with/without current antibiotic use: yes, recent atb therapy for UTI, therapy has been completed, no further c/o or s/s  - Recent or planned invasive dental work: no  - Recent or planned surgeries: no  - Recently received or plans to receive vaccinations: no  - Has treatment related toxicities: no  - Is pregnant:  n/a      Pain: 0   - Is the pain different from normal: n/a   - Is the pain tolerable: n/a   - Is your Doctor aware:  n/a               Fall Risk Screening: Rhiannon Fall Risk  History of Falling, Immediate or Within 3 Months: No  Ambulatory Aid: Crutches/cane/walker (Using walker. Also has a rollator.)  Intravenous  Therapy/Heparin Lock: No  Gait/Transferring: Weak  Mental Status: Oriented to own ability       Review Of Systems:  Review of Systems   Constitutional:  Negative for chills, fatigue and fever.   HENT:   Negative for hearing loss, sore throat, tinnitus and trouble swallowing.    Eyes:  Negative for eye problems.        Wears glasses   Respiratory:  Negative for chest tightness, cough, shortness of breath and wheezing.    Cardiovascular:  Negative for chest pain, leg swelling and palpitations.   Gastrointestinal:  Negative for abdominal pain, constipation, diarrhea, nausea and vomiting.        Occasional abdominal discomfort in the morning   Genitourinary:  Negative for dysuria, frequency and hematuria.    Musculoskeletal:  Positive for gait problem. Negative for arthralgias and myalgias.   Skin:  Negative for itching, rash and wound.   Neurological:  Positive for gait problem. Negative for dizziness, extremity weakness, headaches, light-headedness and numbness.   Hematological:  Bruises/bleeds easily.        Due to thin skin   Psychiatric/Behavioral:  Negative for confusion, depression and sleep disturbance. The patient is not nervous/anxious.          Infusion Readiness:   - Assessment Concerns Related to Infusion: No  - Provider notified: n/a      Document Below Only If Indicated:   New Patient Education:    N/A (returning patient for continuation of therapy. Ongoing education provided as needed.)        Treatment Conditions & Drug Specific Questions:    Denosumab  (PROLIA. XGEVA)    (Unless otherwise specified on patient specific therapy plan):     TREATMENT CONDITIONS:  Unless otherwise specified on patient specific therapy plan HOLD and notify provider prior to proceeding with today's injection if patients:  o Calcium is LESS THAN 8.6 mg/dL OR  Ionized Calcium LESS THAN 1.1 mmol/L  o Recent or planned invasive dental procedure (within 4 weeks)    Lab Results   Component Value Date    CALCIUM 8.9 07/02/2024     PHOS 3.3 03/21/2023      Labs reviewed and patient meets treatment conditions? Yes    DRUG SPECIFIC QUESTIONS:  Is the patient taking calcium and vitamin D? Yes  (Recommended)    Pt Instructed on following risks: (1) hypocalcemia, (2) osteonecrosis of the jaw, (3) atypical femoral fractures, (4) serious infections, and (5) dermatologic reactions?  Yes      REMINDER:  PREGNANCY CATEGORY X DRUG. OBTAIN NEGTATIVE PREGNANCY TEST PRIOR TO FIRST INFUSION FOR WOMEN OF CHILDBEARING ABILITY   REMS DRUG    Recommended Vitals/Observation:  Vitals: Obtain vitals prior to injection.  Observation: Patient may leave immediately following injection.        Weight Based Drug Calculations:    WEIGHT BASED DRUGS: NOT APPLICABLE / FLAT DOSE          Initiated By: Raquel Hartley LPN

## 2024-07-22 ENCOUNTER — OFFICE VISIT (OUTPATIENT)
Dept: PRIMARY CARE | Facility: CLINIC | Age: 84
End: 2024-07-22

## 2024-07-22 ENCOUNTER — LAB (OUTPATIENT)
Dept: LAB | Facility: LAB | Age: 84
End: 2024-07-22

## 2024-07-22 ENCOUNTER — APPOINTMENT (OUTPATIENT)
Dept: PRIMARY CARE | Facility: CLINIC | Age: 84
End: 2024-07-22
Payer: MEDICARE

## 2024-07-22 VITALS
WEIGHT: 181.8 LBS | DIASTOLIC BLOOD PRESSURE: 67 MMHG | TEMPERATURE: 98.1 F | SYSTOLIC BLOOD PRESSURE: 111 MMHG | HEART RATE: 56 BPM | BODY MASS INDEX: 26.03 KG/M2 | OXYGEN SATURATION: 95 % | HEIGHT: 70 IN

## 2024-07-22 VITALS
SYSTOLIC BLOOD PRESSURE: 111 MMHG | OXYGEN SATURATION: 95 % | HEART RATE: 56 BPM | WEIGHT: 181.8 LBS | TEMPERATURE: 98.1 F | DIASTOLIC BLOOD PRESSURE: 67 MMHG | BODY MASS INDEX: 26.03 KG/M2 | HEIGHT: 70 IN

## 2024-07-22 DIAGNOSIS — Z00.00 MEDICARE ANNUAL WELLNESS VISIT, SUBSEQUENT: Primary | ICD-10-CM

## 2024-07-22 DIAGNOSIS — E78.5 HYPERLIPIDEMIA, UNSPECIFIED HYPERLIPIDEMIA TYPE: ICD-10-CM

## 2024-07-22 DIAGNOSIS — M81.0 OSTEOPOROSIS, UNSPECIFIED OSTEOPOROSIS TYPE, UNSPECIFIED PATHOLOGICAL FRACTURE PRESENCE: ICD-10-CM

## 2024-07-22 DIAGNOSIS — R10.84 GENERALIZED ABDOMINAL PAIN: ICD-10-CM

## 2024-07-22 DIAGNOSIS — Z00.01 ENCOUNTER FOR GENERAL ADULT MEDICAL EXAMINATION WITH ABNORMAL FINDINGS: Primary | ICD-10-CM

## 2024-07-22 DIAGNOSIS — M79.605 PAIN IN BOTH LOWER EXTREMITIES: ICD-10-CM

## 2024-07-22 DIAGNOSIS — M79.604 PAIN IN BOTH LOWER EXTREMITIES: ICD-10-CM

## 2024-07-22 DIAGNOSIS — N39.0 URINARY TRACT INFECTION WITHOUT HEMATURIA, SITE UNSPECIFIED: ICD-10-CM

## 2024-07-22 LAB
APPEARANCE UR: ABNORMAL
BILIRUB UR STRIP.AUTO-MCNC: NEGATIVE MG/DL
COLOR UR: ABNORMAL
GLUCOSE UR STRIP.AUTO-MCNC: NORMAL MG/DL
KETONES UR STRIP.AUTO-MCNC: NEGATIVE MG/DL
LEUKOCYTE ESTERASE UR QL STRIP.AUTO: NEGATIVE
MUCOUS THREADS #/AREA URNS AUTO: NORMAL /LPF
NITRITE UR QL STRIP.AUTO: NEGATIVE
PH UR STRIP.AUTO: 5.5 [PH]
PROT UR STRIP.AUTO-MCNC: ABNORMAL MG/DL
RBC # UR STRIP.AUTO: NEGATIVE /UL
RBC #/AREA URNS AUTO: NORMAL /HPF
SP GR UR STRIP.AUTO: 1.02
UROBILINOGEN UR STRIP.AUTO-MCNC: NORMAL MG/DL
WBC #/AREA URNS AUTO: NORMAL /HPF

## 2024-07-22 PROCEDURE — 81001 URINALYSIS AUTO W/SCOPE: CPT

## 2024-07-22 RX ORDER — ROSUVASTATIN CALCIUM 10 MG/1
10 TABLET, COATED ORAL DAILY
Qty: 100 TABLET | Refills: 3 | Status: SHIPPED | OUTPATIENT
Start: 2024-07-22 | End: 2025-08-26

## 2024-07-22 ASSESSMENT — LIFESTYLE VARIABLES
AUDIT-C TOTAL SCORE: 3
HOW OFTEN DO YOU HAVE A DRINK CONTAINING ALCOHOL: 2-3 TIMES A WEEK
HOW OFTEN DO YOU HAVE SIX OR MORE DRINKS ON ONE OCCASION: NEVER
SKIP TO QUESTIONS 9-10: 1
HOW MANY STANDARD DRINKS CONTAINING ALCOHOL DO YOU HAVE ON A TYPICAL DAY: 1 OR 2

## 2024-07-22 ASSESSMENT — PATIENT HEALTH QUESTIONNAIRE - PHQ9
2. FEELING DOWN, DEPRESSED OR HOPELESS: NOT AT ALL
1. LITTLE INTEREST OR PLEASURE IN DOING THINGS: NOT AT ALL
SUM OF ALL RESPONSES TO PHQ9 QUESTIONS 1 & 2: 0

## 2024-07-22 ASSESSMENT — ENCOUNTER SYMPTOMS
DEPRESSION: 0
OCCASIONAL FEELINGS OF UNSTEADINESS: 1
LOSS OF SENSATION IN FEET: 0

## 2024-07-22 ASSESSMENT — PAIN SCALES - GENERAL
PAINLEVEL: 0-NO PAIN
PAINLEVEL: 0-NO PAIN

## 2024-07-22 NOTE — PROGRESS NOTES
"Dakota Pugh is a 84 y.o. year old here for a Medicare Annual Wellness Visit     Health Risk Assessment  In general, health is:  \"very good\"     Concerns with balance:  always aware of it - \"I'm very risk adverse\"     Concerns with teeth or dentures:  not currently, goes 2x/year      Concerns with sexual function:  not noted      Felt anxious, stressed, angry, irritable, lonely, isolated, or had thoughts of hurting themself: some current tension with world/political issues      Has little interest or pleasure in doing things:  no     Bothered by feeling down, depressed, or hopeless:  feels helpless to help his wife      Needs help with grocery shopping, cooking, housework, bathing, grooming, dressing, eating, sitting or standing, walking, using the toilet, handling finances, taking medications, using the telephone, or driving:  yes     Following safety precautions in the home environment and vehicle: removed throw rugs from floors, installed grab bars in the bathroom, handrails in stairwells, having adequate lighting, wearing seatbelt at all times?:  yes     Smokes cigarettes, vapes, or chew tobacco: 1/10th pack for maybe a couple of years     Eats healthy foods including fruits, vegetables, whole grains, and fiber-rich foods:  mostly - not as much appetite - has been losing wt      Number of days per week engages in exercise:  1 day a week goes to [employer] - 45-60 min     Average alcohol consumption: \"I like to drink alcohol\" - maybe only 1 dink at a sitting on avg if that       Current Providers  Specialists: I have reviewed specialist-related care of the patient in the medical record.  [facility] ([place](sp?))  [facility] (Luci)   GI (Ciro)   [facility] (Irene)   Medical/Family history review  Reviewed and updated problem list, medical/surgical/family/social history, medications, and allergies.     Opioid use review  Patient use of opioids:  0           Depression " "screening  Depression Screening PHQ-2 Score   Today 0         Depression screening tool completed and reviewed. Based on score and interview, patient is not at risk for depression. Screening tool discussed with patient, and I recommended no further intervention at this time.     Cognitive screening  Mini Cog Score:  5/5     Cognitive screening reviewed and plan:  not indicated     Functional Observation  Was the patient's timed Up & Go test unsteady or >= 12 seconds?  He does use a walker, rather careful      Advance Care Planning  End of Life planning discussed, including patient's advanced directive wishes:  yes    Vision and Hearing assessment  Visual acuity (required for Welcome to Medicare):  est w/ Dr Bonilla   Hearing Evaluation:  not done - discussed     ====================================================    /67 (BP Location: Left arm, Patient Position: Sitting, BP Cuff Size: Adult)   Pulse 56   Temp 36.7 °C (98.1 °F) (Temporal)   Ht 1.785 m (5' 10.28\")   Wt 82.5 kg (181 lb 12.8 oz)   SpO2 95%   BMI 25.88 kg/m²     Chief Complaint   Patient presents with    Medicare Annual Wellness Visit Subsequent       HPI  1) \"stomach\" feels lousy in AM - ? If PPI in AM is the problem - okay when first awakens, then takes PPI, and not long thereafter gets the discomfort - no marked change in bowels - has been followed by GI     2) sig benefit from Dr Ferguson and acupuncture - legs markedly better - ? How much may be stating related     3) Here to follow-up lipids - denies chest pain, shortness of breath, dizziness, lightheadedness, hand or foot swelling that is new or different.  Taking and tolerating medications well.  Doing well with limited  physical activity.    4) no UTI sxs - was treated recently for UTI though - to do follow-up testing    5) osteoporosis - followed by Rheum - last BMD  Sept 2024    6) prevention:  immunizations - seems due for Prevnar 20 and TDaP    Derm- enc follow-up with Dr Alfonso   " "    Review of Systems  Essentially noncontributory otherwise    Objective   /67 (BP Location: Left arm, Patient Position: Sitting, BP Cuff Size: Adult)   Pulse 56   Temp 36.7 °C (98.1 °F) (Temporal)   Ht 1.785 m (5' 10.28\")   Wt 82.5 kg (181 lb 12.8 oz)   SpO2 95%   BMI 25.88 kg/m²     Physical Exam  Vitals and nursing note reviewed.   Constitutional:       General: He is not in acute distress.     Appearance: He is not ill-appearing.   HENT:      Head: Normocephalic and atraumatic.      Right Ear: Tympanic membrane normal.      Left Ear: Tympanic membrane normal.      Mouth/Throat:      Pharynx: No posterior oropharyngeal erythema.   Eyes:      General: No scleral icterus.     Extraocular Movements: Extraocular movements intact.      Pupils: Pupils are equal, round, and reactive to light.   Cardiovascular:      Rate and Rhythm: Normal rate and regular rhythm.      Heart sounds: No murmur heard.  Pulmonary:      Breath sounds: Normal breath sounds. No wheezing or rhonchi.   Abdominal:      General: Bowel sounds are normal. There is no distension.      Palpations: Abdomen is soft.      Tenderness: There is no abdominal tenderness.   Musculoskeletal:         General: Normal range of motion.      Cervical back: Normal range of motion.      Right lower leg: No edema.      Left lower leg: No edema.   Lymphadenopathy:      Cervical: No cervical adenopathy.   Skin:     General: Skin is warm and dry.   Neurological:      Mental Status: He is alert and oriented to person, place, and time. Mental status is at baseline.      Motor: No weakness.      Coordination: Coordination normal.      Deep Tendon Reflexes: Reflexes normal.   Psychiatric:         Mood and Affect: Mood normal.         Behavior: Behavior normal.         Thought Content: Thought content normal.         Judgment: Judgment normal.         Assessment/Plan   Problem List Items Addressed This Visit       Abdominal pain    Hyperlipidemia    Relevant " "Medications    rosuvastatin (Crestor) 10 mg tablet    Other Relevant Orders    Lipid panel    Osteoporosis    Leg pain     Other Visit Diagnoses       Encounter for general adult medical examination with abnormal findings    -  Primary    Urinary tract infection without hematuria, site unspecified            1) \"stomach\" issues - will try AM H2B in place of HPI    2) acupuncture helped legs get markedly better - ?  If statin change may help this further    3) Here to follow-up lipids -seems to have gotten worse, suggested a trial of a water-soluble statin - recheck 6 weeks    4) no UTI sxs - was treated recently for UTI though - to do follow-up testing    5) osteoporosis - followed by Rheum fro Prolia    6) prevention:  immunizations - due for Prevnar 20 and TDaP    Derm- enc follow-up with Dr Alfonso  "

## 2024-07-22 NOTE — PROGRESS NOTES
"Subjective   Patient ID: Dakota Pugh is a 84 y.o. male who presents for Annual Exam.  HPI  1) \"stomach\" feels lousy in AM - ? If PPI in AM is the problem - okay when first awakens, then takes PPI, and not long thereafter gets the discomfort - no marked change in bowels - has been followed by GI     2) sig benefit from Dr Ferguson and acupuncture - legs markedly better - ? How much may be stating related     3) Here to follow-up lipids - denies chest pain, shortness of breath, dizziness, lightheadedness, hand or foot swelling that is new or different.  Taking and tolerating medications well.  Doing well with limited  physical activity.    4) no UTI sxs - was treated recently for UTI though - to do follow-up testing    5) osteoporosis - followed by Rheum - last BMD  Sept 2024    6) prevention:  immunizations - seems due for Prevnar 20 and TDaP    Derm- enc follow-up with Dr Alfonso       Review of Systems  Essentially noncontributory otherwise    Objective   /67 (BP Location: Left arm, Patient Position: Sitting, BP Cuff Size: Adult)   Pulse 56   Temp 36.7 °C (98.1 °F) (Temporal)   Ht 1.785 m (5' 10.28\")   Wt 82.5 kg (181 lb 12.8 oz)   SpO2 95%   BMI 25.88 kg/m²     Physical Exam  Vitals and nursing note reviewed.   Constitutional:       General: He is not in acute distress.     Appearance: He is not ill-appearing.   HENT:      Head: Normocephalic and atraumatic.      Right Ear: Tympanic membrane normal.      Left Ear: Tympanic membrane normal.      Mouth/Throat:      Pharynx: No posterior oropharyngeal erythema.   Eyes:      General: No scleral icterus.     Extraocular Movements: Extraocular movements intact.      Pupils: Pupils are equal, round, and reactive to light.   Cardiovascular:      Rate and Rhythm: Normal rate and regular rhythm.      Heart sounds: No murmur heard.  Pulmonary:      Breath sounds: Normal breath sounds. No wheezing or rhonchi.   Abdominal:      General: Bowel sounds are normal. " "There is no distension.      Palpations: Abdomen is soft.      Tenderness: There is no abdominal tenderness.   Musculoskeletal:         General: Normal range of motion.      Cervical back: Normal range of motion.      Right lower leg: No edema.      Left lower leg: No edema.   Lymphadenopathy:      Cervical: No cervical adenopathy.   Skin:     General: Skin is warm and dry.   Neurological:      Mental Status: He is alert and oriented to person, place, and time. Mental status is at baseline.      Motor: No weakness.      Coordination: Coordination normal.      Deep Tendon Reflexes: Reflexes normal.   Psychiatric:         Mood and Affect: Mood normal.         Behavior: Behavior normal.         Thought Content: Thought content normal.         Judgment: Judgment normal.         Assessment/Plan   Problem List Items Addressed This Visit       Abdominal pain    Hyperlipidemia    Relevant Medications    rosuvastatin (Crestor) 10 mg tablet    Other Relevant Orders    Lipid panel    Osteoporosis    Leg pain     Other Visit Diagnoses       Encounter for general adult medical examination with abnormal findings    -  Primary    Urinary tract infection without hematuria, site unspecified            1) \"stomach\" issues - will try AM H2B in place of HPI    2) acupuncture helped legs get markedly better - ?  If statin change may help this further    3) Here to follow-up lipids -seems to have gotten worse, suggested a trial of a water-soluble statin - recheck 6 weeks    4) no UTI sxs - was treated recently for UTI though - to do follow-up testing    5) osteoporosis - followed by Rheum fro Prolia    6) prevention:  immunizations - due for Prevnar 20 and TDaP    Derm- enc follow-up with Dr Alfonso       "

## 2024-07-23 LAB — HOLD SPECIMEN: NORMAL

## 2024-07-28 ENCOUNTER — TELEPHONE (OUTPATIENT)
Dept: PRIMARY CARE | Facility: CLINIC | Age: 84
End: 2024-07-28
Payer: MEDICARE

## 2024-07-28 NOTE — TELEPHONE ENCOUNTER
Hi Perfecto - It was a pleasure to see you last week.  As it looks like you didn't look at your result in MyChart, I just wanted you to know that the culture was “negative.”  Hope you have been enjoying your weekend!  TP

## 2024-08-06 ENCOUNTER — TELEPHONE (OUTPATIENT)
Dept: PRIMARY CARE | Facility: CLINIC | Age: 84
End: 2024-08-06
Payer: MEDICARE

## 2024-08-06 NOTE — TELEPHONE ENCOUNTER
Pt states he spoke with you a few weeks ago when he was in about GI issues he continues to have from his hospitalization last year.  You recommended he stop taking Protonix and start taking Pepcid in the morning and night.  He would like you to know that the last three mornings he has not felt well.  Not as bad as when he was taking Protonix, but not feeling good.   Yesterday he did vomit approximately 2 hours after breakfast.   Continues to have some intermittent nausea and upset stomach.   He questions if it may help him to take Pepcid after eating as opposed to before?  He thinks it is upsetting his stomach to take on an empty stomach.  He also is not sure if he should discuss with Dr. Tanner but notes you had made the med changes and wanted to start with you.

## 2024-08-06 NOTE — TELEPHONE ENCOUNTER
Spoke to patient and advised that he should try to take the Pepcid after meals over the next few days and see how it works.  He is happy to try this and will let us know if its makes the difference.

## 2024-08-06 NOTE — TELEPHONE ENCOUNTER
Sure - I think he should try experimenting with taking it after a meal  for the next few days - Thanks

## 2024-08-26 DIAGNOSIS — K30 ACID INDIGESTION: ICD-10-CM

## 2024-08-26 RX ORDER — FAMOTIDINE 20 MG/1
20 TABLET, FILM COATED ORAL 2 TIMES DAILY
Qty: 180 TABLET | Refills: 3 | Status: SHIPPED | OUTPATIENT
Start: 2024-08-26

## 2024-09-23 ENCOUNTER — APPOINTMENT (OUTPATIENT)
Dept: OPHTHALMOLOGY | Facility: CLINIC | Age: 84
End: 2024-09-23
Payer: MEDICARE

## 2024-09-23 DIAGNOSIS — H50.30 ESOTROPIA, INTERMITTENT: ICD-10-CM

## 2024-09-23 DIAGNOSIS — H53.40 VISUAL FIELD LOSS: Primary | ICD-10-CM

## 2024-09-23 DIAGNOSIS — H04.129 DRY EYE: ICD-10-CM

## 2024-09-23 DIAGNOSIS — H25.813 COMBINED FORMS OF AGE-RELATED CATARACT, BILATERAL: ICD-10-CM

## 2024-09-23 PROCEDURE — 92083 EXTENDED VISUAL FIELD XM: CPT | Performed by: OPHTHALMOLOGY

## 2024-09-23 PROCEDURE — 1036F TOBACCO NON-USER: CPT | Performed by: OPHTHALMOLOGY

## 2024-09-23 PROCEDURE — 99213 OFFICE O/P EST LOW 20 MIN: CPT | Performed by: OPHTHALMOLOGY

## 2024-09-23 PROCEDURE — 1157F ADVNC CARE PLAN IN RCRD: CPT | Performed by: OPHTHALMOLOGY

## 2024-09-23 ASSESSMENT — EXTERNAL EXAM - RIGHT EYE: OD_EXAM: NORMAL

## 2024-09-23 ASSESSMENT — VISUAL ACUITY
OD_CC: 20/20
METHOD: SNELLEN - LINEAR
OS_CC: 20/30
CORRECTION_TYPE: GLASSES

## 2024-09-23 ASSESSMENT — REFRACTION_MANIFEST
OS_AXIS: 057
OS_CYLINDER: -0.25
METHOD_AUTOREFRACTION: 1
OD_AXIS: 090
OD_CYLINDER: -0.75
OD_SPHERE: -1.25
OD_AXIS: 090
OS_SPHERE: -1.25
OD_SPHERE: -1.25
OD_ADD: +3.00
OD_CYLINDER: -0.75
OS_CYLINDER: -0.25
OS_SPHERE: -1.25
OS_ADD: +3.00
OS_AXIS: 057

## 2024-09-23 ASSESSMENT — SLIT LAMP EXAM - LIDS
COMMENTS: DERMATOCHALASIS
COMMENTS: DERMATOCHALASIS

## 2024-09-23 ASSESSMENT — EXTERNAL EXAM - LEFT EYE: OS_EXAM: NORMAL

## 2024-09-23 ASSESSMENT — CUP TO DISC RATIO
OS_RATIO: .3
OD_RATIO: .3

## 2024-09-23 ASSESSMENT — TONOMETRY
IOP_METHOD: GOLDMANN APPLANATION
OS_IOP_MMHG: 12
OD_IOP_MMHG: 12

## 2024-09-23 ASSESSMENT — REFRACTION_WEARINGRX
OD_SPHERE: -2.25
OS_SPHERE: -2.25
OS_ADD: +2.75
OD_ADD: +2.75

## 2024-09-23 NOTE — PROGRESS NOTES
Assessment/Plan   Diagnoses and all orders for this visit:  Visual field loss  -     [facility] Visual Field - OU - Both Eyes  Bitemporal hemianopsia  MRI brain with contrast,MRI orbits with contrast  Dry eye  recommend use of artificial tears 4 times a day, may use gel or ointment at night if symptoms are present in the morning.   Combined forms of age-related cataract, bilateral  Cataract not visually significant at this time. Discussed cataract surgery indications,20/50 or worse, glare dropping vision 2 lines or more and affecting activities of daily living  Observe for progression

## 2024-09-24 DIAGNOSIS — H53.40 VISUAL FIELD LOSS: Primary | ICD-10-CM

## 2024-09-27 ENCOUNTER — HOSPITAL ENCOUNTER (OUTPATIENT)
Dept: RADIOLOGY | Facility: CLINIC | Age: 84
Discharge: HOME | End: 2024-09-27
Payer: MEDICARE

## 2024-09-27 DIAGNOSIS — H53.40 VISUAL FIELD LOSS: ICD-10-CM

## 2024-09-27 DIAGNOSIS — H50.30 ESOTROPIA, INTERMITTENT: ICD-10-CM

## 2024-09-27 PROCEDURE — 70543 MRI ORBT/FAC/NCK W/O &W/DYE: CPT

## 2024-09-27 PROCEDURE — 2550000001 HC RX 255 CONTRASTS: Performed by: OPHTHALMOLOGY

## 2024-09-27 PROCEDURE — 70553 MRI BRAIN STEM W/O & W/DYE: CPT

## 2024-09-27 PROCEDURE — A9575 INJ GADOTERATE MEGLUMI 0.1ML: HCPCS | Performed by: OPHTHALMOLOGY

## 2024-09-27 RX ORDER — GADOTERATE MEGLUMINE 376.9 MG/ML
17 INJECTION INTRAVENOUS
Status: COMPLETED | OUTPATIENT
Start: 2024-09-27 | End: 2024-09-27

## 2024-09-30 ENCOUNTER — TELEPHONE (OUTPATIENT)
Dept: PRIMARY CARE | Facility: CLINIC | Age: 84
End: 2024-09-30
Payer: MEDICARE

## 2024-09-30 DIAGNOSIS — D35.2 PITUITARY MACROADENOMA (MULTI): Primary | ICD-10-CM

## 2024-09-30 RX ORDER — GLUCOSAMINE/CHONDROITIN/C/MANG 500-400 MG
1 CAPSULE ORAL DAILY
COMMUNITY
End: 2024-10-01 | Stop reason: ALTCHOICE

## 2024-09-30 RX ORDER — AMOXICILLIN 250 MG
2 CAPSULE ORAL 2 TIMES DAILY PRN
COMMUNITY
Start: 2023-02-09 | End: 2024-10-01 | Stop reason: ALTCHOICE

## 2024-09-30 RX ORDER — MIDODRINE HYDROCHLORIDE 5 MG/1
5 TABLET ORAL EVERY 8 HOURS PRN
COMMUNITY
End: 2024-10-01 | Stop reason: ALTCHOICE

## 2024-09-30 RX ORDER — AMOXICILLIN 500 MG/1
CAPSULE ORAL
COMMUNITY
Start: 2024-08-19

## 2024-09-30 RX ORDER — FLUORIDE TOOTHPASTE
15 TOOTHPASTE DENTAL EVERY 4 HOURS PRN
COMMUNITY
End: 2024-10-01 | Stop reason: ALTCHOICE

## 2024-09-30 NOTE — TELEPHONE ENCOUNTER
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

## 2024-09-30 NOTE — TELEPHONE ENCOUNTER
Pt was having some troubles with his vision and had an MRI completed last week as ordered by Dr. Bonilla.   Results showed that he has a mass growing.  He believes that he may need to get set up with Neurosurgery for excision and Endocrinology as well.       Please review result and advise.  I can assist with scheduling.

## 2024-10-01 ENCOUNTER — APPOINTMENT (OUTPATIENT)
Dept: GASTROENTEROLOGY | Facility: CLINIC | Age: 84
End: 2024-10-01
Payer: MEDICARE

## 2024-10-01 VITALS — HEART RATE: 82 BPM | HEIGHT: 69 IN | WEIGHT: 179 LBS | BODY MASS INDEX: 26.51 KG/M2

## 2024-10-01 DIAGNOSIS — R10.33 PERIUMBILICAL ABDOMINAL PAIN: Primary | ICD-10-CM

## 2024-10-01 DIAGNOSIS — K21.9 GASTROESOPHAGEAL REFLUX DISEASE, UNSPECIFIED WHETHER ESOPHAGITIS PRESENT: ICD-10-CM

## 2024-10-01 PROCEDURE — 1036F TOBACCO NON-USER: CPT | Performed by: INTERNAL MEDICINE

## 2024-10-01 PROCEDURE — 1159F MED LIST DOCD IN RCRD: CPT | Performed by: INTERNAL MEDICINE

## 2024-10-01 PROCEDURE — 1157F ADVNC CARE PLAN IN RCRD: CPT | Performed by: INTERNAL MEDICINE

## 2024-10-01 PROCEDURE — 99214 OFFICE O/P EST MOD 30 MIN: CPT | Performed by: INTERNAL MEDICINE

## 2024-10-01 RX ORDER — PANTOPRAZOLE SODIUM 40 MG/1
40 TABLET, DELAYED RELEASE ORAL EVERY EVENING
Qty: 90 TABLET | Refills: 3 | Status: SHIPPED | OUTPATIENT
Start: 2024-10-01 | End: 2025-10-01

## 2024-10-01 NOTE — PROGRESS NOTES
"Subjective     History of Present Illness:   Dakota Pugh is a 84 y.o. male who presents to GI clinic for \"stomach ache starts around breakfast and worse after eating. Lasts couple hours.  Wakes up feeling fine but then even as he is preparing breakfast starts to have aching in the mid abdomen.  Feels worse after eating.  Breakfast --  Toast, cereal, yogurt, sometimes biscotti.  Stopped coffee but no change  Bowl of fruit.    Pain lasts for a couple hours, goes away, and then he feels absolutely fine, perfectly normal for the rest the day including after eating.    Pain does not radiate.  Aching but very uncomfortable it is really doing anything while it is happening.    Patient and wife note that daughter recently diagnosed with gastric ulcer and that his father had gastric ulcer as well.  .    Review of Systems  Review of Systems    Social History   reports that he has never smoked. He has been exposed to tobacco smoke. He has never used smokeless tobacco. He reports current alcohol use of about 3.0 standard drinks of alcohol per week. He reports that he does not currently use drugs.     Allergies  No Known Allergies    Medications  Current Outpatient Medications   Medication Instructions    acetaminophen (Tylenol Extra Strength) 500 mg tablet 1 tablet, oral, Every 8 hours PRN    amoxicillin (Amoxil) 500 mg capsule TAKE 4 CAPSULES BY MOUTH 1 HOUR BEFORE DENTAL APPOINMENT    calcium carbonate (Oscal) 500 mg calcium (1,250 mg) tablet 1 tablet, oral, Daily    calcium carbonate-vitamin D3 600 mg-5 mcg (200 unit) tablet oral, 2 times daily    cholecalciferol (Vitamin D-3) 25 MCG (1000 UT) tablet oral    denosumab (PROLIA) 60 mg, subcutaneous, Every 6 months    famotidine (PEPCID) 20 mg, oral, 2 times daily    fish oil concentrate (Omega-3) 120-180 mg capsule 1 capsule, oral, Daily    pantoprazole (PROTONIX) 40 mg, oral, 2 times daily    rosuvastatin (CRESTOR) 10 mg, oral, Daily    vit A/vit C/vit E/zinc/copper " (PRESERVISION AREDS ORAL) oral        Objective   Visit Vitals  Pulse 82      Physical Exam  Constitutional:       Appearance: Normal appearance.   HENT:      Mouth/Throat:      Mouth: Mucous membranes are moist.      Pharynx: Oropharynx is clear.   Eyes:      Extraocular Movements: Extraocular movements intact.      Pupils: Pupils are equal, round, and reactive to light.   Cardiovascular:      Rate and Rhythm: Normal rate and regular rhythm.      Heart sounds: No murmur heard.  Pulmonary:      Effort: Pulmonary effort is normal.      Breath sounds: Normal breath sounds.   Abdominal:      General: Abdomen is flat. Bowel sounds are normal.      Palpations: Abdomen is soft. There is no mass.   Skin:     General: Skin is warm and dry.   Neurological:      Mental Status: He is alert.   Psychiatric:         Mood and Affect: Mood normal.         Behavior: Behavior normal.         Thought Content: Thought content normal.         Judgment: Judgment normal.                       Assessment/Plan   Dakota Pugh is a 84 y.o. male who presents to GI clinic for mid abdominal aching pain on a daily basis beginning in the morning but not present on awakening and then resolving and not troubling him again.  Couple months ago changed from pantoprazole every morning and famotidine every afternoon to famotidine twice daily (because he felt he was feeling worse after taking the pantoprazole in the morning).  Question acid peptic disease, history of ulcer.  Less likely neoplastic.  Less likely intestinal/IBD although the aching is lower abdominal.  No associated changes or difficulties with his bowel movements.    Plan    Continue famotidine in the morning but take pantoprazole before his evening snack    Let me know in a couple weeks how he is doing    If symptoms persist likely will plan to do EGD.      Rah Tanner MD

## 2024-10-02 NOTE — TELEPHONE ENCOUNTER
Patient and I spoke about the MRI and plan.  Patient shares that he has had some 7 or 8 months of current visual changes, left greater than right peripheral vision blurring/loss.  We discussed the implications of pituitary macroadenoma regarding potential effect on hormonal levels, need for endocrinology evaluation with labs.  We discussed how the pituitary gland growth affects the vision.  We discussed the likely need for resection per neurosurgery.    Patient advised to call back if he has any additional questions.    John Gtz MD

## 2024-10-02 NOTE — TELEPHONE ENCOUNTER
Pt scheduled with Dr. Bermudez tomorrow 10/3/25 at [facility].     Pt scheduled with Dr. Antoine Mcleod on 10/15/25.     Patient understood and agreeable.

## 2024-10-03 ENCOUNTER — LAB (OUTPATIENT)
Dept: LAB | Facility: LAB | Age: 84
End: 2024-10-03
Payer: MEDICARE

## 2024-10-03 ENCOUNTER — OFFICE VISIT (OUTPATIENT)
Dept: NEUROSURGERY | Facility: CLINIC | Age: 84
End: 2024-10-03
Payer: MEDICARE

## 2024-10-03 VITALS
TEMPERATURE: 96.3 F | SYSTOLIC BLOOD PRESSURE: 105 MMHG | HEART RATE: 50 BPM | DIASTOLIC BLOOD PRESSURE: 68 MMHG | WEIGHT: 179 LBS | HEIGHT: 71 IN | BODY MASS INDEX: 25.06 KG/M2

## 2024-10-03 DIAGNOSIS — D35.2 PITUITARY ADENOMA (MULTI): ICD-10-CM

## 2024-10-03 DIAGNOSIS — E78.5 HYPERLIPIDEMIA, UNSPECIFIED HYPERLIPIDEMIA TYPE: ICD-10-CM

## 2024-10-03 DIAGNOSIS — D35.2 PITUITARY ADENOMA (MULTI): Primary | ICD-10-CM

## 2024-10-03 LAB
CHOLEST SERPL-MCNC: 207 MG/DL (ref 0–199)
CHOLESTEROL/HDL RATIO: 8.7
CORTIS SERPL-MCNC: 13.9 UG/DL (ref 2.5–20)
DHEA-S SERPL-MCNC: 5 UG/DL (ref 28–290)
FSH SERPL-ACNC: 5.9 IU/L
HDLC SERPL-MCNC: 23.9 MG/DL
LDLC SERPL CALC-MCNC: 149 MG/DL
LH SERPL-ACNC: 2.2 IU/L
NON HDL CHOLESTEROL: 183 MG/DL (ref 0–149)
PROLACTIN SERPL-MCNC: 14 UG/L (ref 2–18)
TESTOST SERPL-MCNC: <30 NG/DL (ref 240–1000)
TRIGL SERPL-MCNC: 169 MG/DL (ref 0–149)
TSH SERPL-ACNC: 1.06 MIU/L (ref 0.44–3.98)
VLDL: 34 MG/DL (ref 0–40)

## 2024-10-03 PROCEDURE — 1126F AMNT PAIN NOTED NONE PRSNT: CPT | Performed by: NEUROLOGICAL SURGERY

## 2024-10-03 PROCEDURE — 83002 ASSAY OF GONADOTROPIN (LH): CPT

## 2024-10-03 PROCEDURE — 83001 ASSAY OF GONADOTROPIN (FSH): CPT

## 2024-10-03 PROCEDURE — 84443 ASSAY THYROID STIM HORMONE: CPT

## 2024-10-03 PROCEDURE — 1159F MED LIST DOCD IN RCRD: CPT | Performed by: NEUROLOGICAL SURGERY

## 2024-10-03 PROCEDURE — 1157F ADVNC CARE PLAN IN RCRD: CPT | Performed by: NEUROLOGICAL SURGERY

## 2024-10-03 PROCEDURE — 1036F TOBACCO NON-USER: CPT | Performed by: NEUROLOGICAL SURGERY

## 2024-10-03 PROCEDURE — 82627 DEHYDROEPIANDROSTERONE: CPT

## 2024-10-03 PROCEDURE — 84146 ASSAY OF PROLACTIN: CPT

## 2024-10-03 PROCEDURE — 84403 ASSAY OF TOTAL TESTOSTERONE: CPT

## 2024-10-03 PROCEDURE — 82533 TOTAL CORTISOL: CPT

## 2024-10-03 PROCEDURE — 99203 OFFICE O/P NEW LOW 30 MIN: CPT | Performed by: NEUROLOGICAL SURGERY

## 2024-10-03 PROCEDURE — 99213 OFFICE O/P EST LOW 20 MIN: CPT | Mod: 57 | Performed by: NEUROLOGICAL SURGERY

## 2024-10-03 PROCEDURE — 84305 ASSAY OF SOMATOMEDIN: CPT

## 2024-10-03 ASSESSMENT — PAIN SCALES - GENERAL: PAINLEVEL: 0-NO PAIN

## 2024-10-03 NOTE — PROGRESS NOTES
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 Patient was recommended for MR imaging found to have a sellar and suprasellar mass measuring 2.4 x 2.4 x 3.4cm with mass effect on OC. Given findings patient was referred to endocrinology and has an appointment with Dr. Denise as well as neurosurgery. He presents to clinic for surgical evaluation.       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  09/27/24:           Assessment & Plan      Diagnosis:  Diagnoses and all orders for this visit:  Pituitary adenoma (Multi)  -     TSH with reflex to Free T4 if abnormal; Future  -     Prolactin; Future  -     DHEA-Sulfate; Future  -     FSH & LH; Future  -     Growth Hormone; Future  -     Insulin-Like Growth Factor 1; Future  -     Cortisol; Future  -     Adrenocorticotropic Hormone (ACTH); Future  -     Testosterone; Future          Provider Impression:   Patient seen and examined and has symptomatic pituitary mass with vision loss    Natural history and treatment options discussed in detail.    Given patient age, symptoms, etc. I recommend consideration for endonasal endoscopic resection with ENT if labs show it is not a " prolactinoma    Risks of treatment and alternatives discussed in detail (bleeding, infection, blindenss, paralysis, stroke, death, observation, and fact that visual loss he has may be permanent etc.) and patient agrees to proceed as noted above.    I also discussed that I perform overlapping surgical cases but would be present for all critical portions of the surgical procedure.      Medical History     Past Medical History:   Diagnosis Date    Cataract     Dyslipidemia     Iron deficiency     Low BP     Osteoporosis     Other conditions influencing health status     Myalgia and myositis    Personal history of malignant neoplasm of bladder 09/13/2018    History of malignant neoplasm of bladder    Stomach ulcer      Past Surgical History:   Procedure Laterality Date    BLADDER SURGERY  04/28/2015    Bladder Surgery    OTHER SURGICAL HISTORY  05/30/2020    Shoulder replacement    TONSILLECTOMY  12/11/2013    Tonsillectomy With Adenoidectomy    TOTAL HIP ARTHROPLASTY  12/11/2013    Total Hip Replacement     Social History     Tobacco Use    Smoking status: Never     Passive exposure: Past    Smokeless tobacco: Never   Substance Use Topics    Alcohol use: Yes     Alcohol/week: 3.0 standard drinks of alcohol     Types: 1 Glasses of wine, 1 Cans of beer, 1 Shots of liquor per week     Comment: SOCIAL 2 TO 3    Drug use: Not Currently     Family History   Problem Relation Name Age of Onset    Breast cancer Mother      Glaucoma Father      Asthma Daughter Jesika     GORAN disease Son Naun     Diabetes Son Naun     Sleep apnea Son Naun     Other (HTN) Son Naun      No Known Allergies  Current Outpatient Medications   Medication Instructions    acetaminophen (Tylenol Extra Strength) 500 mg tablet 1 tablet, oral, Every 8 hours PRN    amoxicillin (Amoxil) 500 mg capsule TAKE 4 CAPSULES BY MOUTH 1 HOUR BEFORE DENTAL APPOINMENT    calcium carbonate (Oscal) 500 mg calcium (1,250 mg) tablet 1 tablet, oral, Daily    calcium  carbonate-vitamin D3 600 mg-5 mcg (200 unit) tablet oral, 2 times daily    cholecalciferol (Vitamin D-3) 25 MCG (1000 UT) tablet oral    denosumab (PROLIA) 60 mg, subcutaneous, Every 6 months    famotidine (PEPCID) 20 mg, oral, 2 times daily    fish oil concentrate (Omega-3) 120-180 mg capsule 1 capsule, oral, Daily    pantoprazole (PROTONIX) 40 mg, oral, Every evening    rosuvastatin (CRESTOR) 10 mg, oral, Daily    vit A/vit C/vit E/zinc/copper (PRESERVISION AREDS ORAL) oral

## 2024-10-03 NOTE — PATIENT INSTRUCTIONS
Welcome to Dr. Bermudez's clinic. We are here to assist you through your Neurological Surgery care at [facility]. Dr. Bermudez's office number is [phone]. This number is the most direct way to communicate with the office. The office fax number is [phone].     A CT sinus was ordered for you to be completed as part of your surgical workup. Our  will call you to get this scheduled.     I will have the ENT team reach out to you in order to schedule the appointment for surgical evaluation.     Please get the labs completed that were ordered during your visit today at any  lab.

## 2024-10-05 ENCOUNTER — APPOINTMENT (OUTPATIENT)
Dept: RADIOLOGY | Facility: CLINIC | Age: 84
End: 2024-10-05
Payer: MEDICARE

## 2024-10-05 LAB
ACTH PLAS-MCNC: 14.4 PG/ML (ref 7.2–63.3)
GH SERPL-MCNC: 0.16 NG/ML (ref 0.05–3)
IGF-I SERPL-MCNC: 40 NG/ML (ref 16–176)
IGF-I Z-SCORE SERPL: -0.9

## 2024-10-15 ENCOUNTER — LAB (OUTPATIENT)
Dept: LAB | Facility: LAB | Age: 84
End: 2024-10-15
Payer: COMMERCIAL

## 2024-10-15 ENCOUNTER — APPOINTMENT (OUTPATIENT)
Dept: ENDOCRINOLOGY | Facility: CLINIC | Age: 84
End: 2024-10-15
Payer: MEDICARE

## 2024-10-15 VITALS
SYSTOLIC BLOOD PRESSURE: 108 MMHG | BODY MASS INDEX: 25.76 KG/M2 | WEIGHT: 184 LBS | HEART RATE: 57 BPM | DIASTOLIC BLOOD PRESSURE: 53 MMHG | HEIGHT: 71 IN

## 2024-10-15 DIAGNOSIS — D35.2 PITUITARY MACROADENOMA (MULTI): Primary | ICD-10-CM

## 2024-10-15 DIAGNOSIS — D35.2 PITUITARY MACROADENOMA (MULTI): ICD-10-CM

## 2024-10-15 DIAGNOSIS — E23.0 HYPOPITUITARISM (MULTI): ICD-10-CM

## 2024-10-15 LAB
FSH SERPL-ACNC: 5.7 IU/L
LH SERPL-ACNC: 2.2 IU/L
T4 FREE SERPL-MCNC: 1 NG/DL (ref 0.78–1.48)

## 2024-10-15 PROCEDURE — 83002 ASSAY OF GONADOTROPIN (LH): CPT

## 2024-10-15 PROCEDURE — 83519 RIA NONANTIBODY: CPT

## 2024-10-15 PROCEDURE — 83001 ASSAY OF GONADOTROPIN (FSH): CPT

## 2024-10-15 PROCEDURE — 36415 COLL VENOUS BLD VENIPUNCTURE: CPT

## 2024-10-15 PROCEDURE — 84439 ASSAY OF FREE THYROXINE: CPT

## 2024-10-15 ASSESSMENT — PAIN SCALES - GENERAL: PAINLEVEL: 0-NO PAIN

## 2024-10-15 NOTE — PROGRESS NOTES
I saw and evaluated the patient. I personally obtained the key and critical portions of the history and physical exam or was physically present for key and critical portions performed by the resident/fellow. I reviewed the resident/fellow's documentation and discussed the patient with the resident/fellow. I agree with the resident/fellow's medical decision making as documented in the note.    Antoine Denise MD

## 2024-10-15 NOTE — PROGRESS NOTES
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       Mr Pugh ,a retired , is here today with his wife.    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  To mention that Mr Pugh has passed drivers license June 2023.    ROS:  Headache: dull, more regularly few months ago, not severe  Mostly In the evenings- TV time  No night time awakenings due to headache  Denies any nausea, vomiting,   Sleep: good, interrupted 1-2 times due to the need to urinate, no FATEMEH, doesn't snore anymore per his wife.  Energy: \" little\" since last hospital admission (feb -> May 2023, when he had perforated ulcer, with freq UTI, infections..)  Bowel mvts: normal , regular  Heat/cold intolerance: denies - more susceptible to cold in the past 5 years  Weight: 10 lbs in the past year, despite lower appetite  Skin: dry   Muscle strength: \" coming back\",working with PT , uses a cane and a Rolator..  No  changes in facial features. Ring and shoe size same , has chronic skin tag, used to have colonic polyps, ..  No gynecomastia, galactorrhea, no pain  Night sweats/hot flushes: denies  Testicular size same,   Had a hx of stage1? bladder cancer: with uretheral polyp, c/b erectile dysfunction, libido decreased since then  Lost body hair, chest , face mostly over the past 20 years.  No dizziness or LOC      No Known Allergies     Current Outpatient Medications on File Prior to Visit   Medication Sig Dispense Refill    acetaminophen (Tylenol Extra Strength) 500 mg tablet Take 1 tablet (500 mg) by mouth every 8 hours if needed for " "mild pain (1 - 3).      calcium carbonate (Oscal) 500 mg calcium (1,250 mg) tablet Take 1 tablet (1,250 mg) by mouth once daily.      calcium carbonate-vitamin D3 600 mg-5 mcg (200 unit) tablet Take by mouth twice a day.      cholecalciferol (Vitamin D-3) 25 MCG (1000 UT) tablet Take by mouth.      denosumab (Prolia) 60 mg/mL syringe Inject 1 mL (60 mg total) under the skin every 6 months. 1 mL 1    famotidine (Pepcid) 20 mg tablet Take 1 tablet (20 mg) by mouth 2 times a day. 180 tablet 3    fish oil concentrate (Omega-3) 120-180 mg capsule Take 1 capsule (1 g) by mouth once daily.      pantoprazole (ProtoNix) 40 mg EC tablet Take 1 tablet (40 mg) by mouth once daily in the evening. 90 tablet 3    rosuvastatin (Crestor) 10 mg tablet Take 1 tablet (10 mg) by mouth once daily. 100 tablet 3    amoxicillin (Amoxil) 500 mg capsule TAKE 4 CAPSULES BY MOUTH 1 HOUR BEFORE DENTAL APPOINMENT      vit A/vit C/vit E/zinc/copper (PRESERVISION AREDS ORAL) Take by mouth.       No current facility-administered medications on file prior to visit.      Visit Vitals  /53   Pulse 57   Ht 1.803 m (5' 11\")   Wt 83.5 kg (184 lb)   BMI 25.66 kg/m²   Smoking Status Never   BSA 2.04 m²      PE:    Constitutional: NAD, Aox3, using a rolator, very pleasant  Skin/Hair: Warm, dry skin.  HEENT: EOMI, Anicteric scleras, No lid lag or lid retraction, No Chemosis, No TTP of ocular globes. Dry oral mucosa. -Chvostek sign   Neck: Soft, supple. Non tender, full ROM, no lymphadenopathy. Thyroid gland palpation: non nodular, soft consistency, non tender.   Cardiovascular: Normal s1s2, RRR  Respiratory:no increased work of bleeding  Abdomen: +BS, Soft, non-tender to palpation.  Extremities: Preserved peripheral pulses, No tremors  Neuro: Moving all extremities spontaneously. CN's grossly intact. no focal weaknss.   DTRs: mild delay in relaxation phase.  Skin: dry         Latest Reference Range & Units 07/02/24 09:37 10/03/24 14:01   CORTISOL 2.5 - " Pulmonology 20.0 ug/dL  13.9   FOLLICLE STIMULATING HORMONE IU/L  5.9   Hemoglobin A1C see below % 5.2    LH IU/L  2.2   PROLACTIN 2.0 - 18.0 ug/L  14.0   Thyroid Stimulating Hormone 0.44 - 3.98 mIU/L 1.94 1.06   Growth Hormone 0.05 - 3.00 ng/mL  0.16   Vitamin D, 25-Hydroxy, Total 30 - 100 ng/mL 67    DHEA Sulfate 28 - 290 ug/dL  5 (L)   Testosterone 240 - 1,000 ng/dL  <30 (L)   GLUCOSE 74 - 99 mg/dL 90    Estimated Average Glucose Not Established mg/dL 103    Adrenocorticotropic Hormone (ACTH) 7.2 - 63.3 pg/mL  14.4   IGF 1 (Insulin-Like Growth Factor 1) 16 - 176 ng/mL  40   IGF 1 Z Score Calculation   -0.9   (L): Data is abnormally low    Imaging:  -MR ORBIT W AND WO IV CONTRAST; MR BRAIN W AND WO IV CONTRAST;  9/27/2024 5:44 pm      INDICATION:  Signs/Symptoms:visual field loss; Signs/Symptoms:bitemporal  hemianopsia.      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  High resolution coronal T1 and STIR images of the orbits  were obtained.  Following administration of 17 ML Dotarem gadolinium  based intravenous contrast, post contrast T1 images of the brain and  high resolution fat-suppressed axial and coronal T1 images of the  orbits were acquired.      FINDINGS:  CSF Spaces: The ventricles, sulci and basal cisterns are prominent  compatible with age related involutional changes and mild-to-moderate  volume loss. There is no extra-axial fluid collection.      Parenchyma: There is no diffusion restriction abnormality to suggest  acute infarct.  There is no focal parenchymal signal abnormality.  No  abnormal parenchymal enhancement is identified.  There is no mass  effect or midline shift. Cerebellar tonsils are above the foramen  magnum. No abnormal  susceptibility artifact.      There is a sellar and suprasellar mass measuring 2.4 x 2.4 x 3.4 cm  transverse by cc. There is mass effect upon the optic apparatus and  upper displacement of the anterior cerebral arteries. There is  abutment of the cavernous sinus on the right and suggestion of  involvement of the cavernous sinus on the left. Fluid signal within  Meckel's cave is preserved bilaterally.      Orbits: There is no intraorbital mass.  The left optic nerve appears  diminutive compared to the right. No abnormal signal or enhancement  within the optic nerves. The extraocular muscles are unremarkable.  The lacrimal glands are within normal limits.      Paranasal Sinuses and Mastoids: Visualized paranasal sinuses and  mastoid air cells are predominantly clear..      IMPRESSION:  1. There is a sellar and suprasellar mass measuring 2.4 x 2.4 x 3.4  cm which is suggestive of a pituitary macro adenoma. There is mass  effect upon the optic apparatus and suggestion of involvement of the  cavernous sinus on the left. There is abutment of the cavernous sinus  on the right.  2. No orbital mass. The left optic nerve appears diminutive compared  to the left. No abnormal signal or enhancement within the optic  nerves.  3. No acute intracranial infarct, mass effect or abnormal parenchymal  enhancement.        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  -has CT sinus with / out iv contrast on 10/18   -follow up with ENT on Tuesday 10/22 dr [place]  -discussed with the patient risk of pituitary hormone deficiency that can be associated with the surgery and the need for hormonal replacement prior and or post Op  -will call patient with results.    Patient seen ,case discussed with DR Denise

## 2024-10-18 ENCOUNTER — HOSPITAL ENCOUNTER (OUTPATIENT)
Dept: RADIOLOGY | Facility: CLINIC | Age: 84
Discharge: HOME | End: 2024-10-18
Payer: MEDICARE

## 2024-10-18 DIAGNOSIS — D35.2 PITUITARY ADENOMA (MULTI): ICD-10-CM

## 2024-10-18 PROCEDURE — 70486 CT MAXILLOFACIAL W/O DYE: CPT

## 2024-10-21 ENCOUNTER — DOCUMENTATION (OUTPATIENT)
Dept: ENDOCRINOLOGY | Facility: HOSPITAL | Age: 84
End: 2024-10-21
Payer: MEDICARE

## 2024-10-21 NOTE — PROGRESS NOTES
After reviewing pituitary panel [alpha subunit is still pending], it does not appear that there is any need for hormonal replacement prior to transsphenoidal surgery.  GH 40 (first to drop when pituitary function is affected)  DHEA-S <5 (expected to be low with age, possible previous exposure to any route of glucocorticoids), ACTH 14.4, cortisol 13.9 [at 2 PM], no evidence of AI  FT4: 1, TSH 1.96    Patient will be seen by ENT on 10/22, transsphenoidal resection still not scheduled yet.  Called the patient this afternoon and updated him with the above plan.    Endocrinology will follow-up with patient once admitted OR.  Plan discussed with Dr. 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

## 2024-10-22 ENCOUNTER — APPOINTMENT (OUTPATIENT)
Dept: OTOLARYNGOLOGY | Facility: CLINIC | Age: 84
End: 2024-10-22
Payer: MEDICARE

## 2024-10-22 DIAGNOSIS — J34.3 HYPERTROPHY OF INFERIOR NASAL TURBINATE: ICD-10-CM

## 2024-10-22 DIAGNOSIS — R09.82 POST-NASAL DRAINAGE: ICD-10-CM

## 2024-10-22 DIAGNOSIS — D49.7 PITUITARY TUMOR: Primary | ICD-10-CM

## 2024-10-22 PROCEDURE — 31231 NASAL ENDOSCOPY DX: CPT | Performed by: OTOLARYNGOLOGY

## 2024-10-22 PROCEDURE — 1159F MED LIST DOCD IN RCRD: CPT | Performed by: OTOLARYNGOLOGY

## 2024-10-22 PROCEDURE — 1160F RVW MEDS BY RX/DR IN RCRD: CPT | Performed by: OTOLARYNGOLOGY

## 2024-10-22 PROCEDURE — 99204 OFFICE O/P NEW MOD 45 MIN: CPT | Performed by: OTOLARYNGOLOGY

## 2024-10-22 PROCEDURE — 1157F ADVNC CARE PLAN IN RCRD: CPT | Performed by: OTOLARYNGOLOGY

## 2024-10-22 ASSESSMENT — PATIENT HEALTH QUESTIONNAIRE - PHQ9
2. FEELING DOWN, DEPRESSED OR HOPELESS: NOT AT ALL
SUM OF ALL RESPONSES TO PHQ9 QUESTIONS 1 AND 2: 0
1. LITTLE INTEREST OR PLEASURE IN DOING THINGS: NOT AT ALL

## 2024-10-24 ENCOUNTER — APPOINTMENT (OUTPATIENT)
Dept: NEUROSURGERY | Facility: CLINIC | Age: 84
End: 2024-10-24
Payer: MEDICARE

## 2024-10-25 LAB — ALPHA SUBUNIT: 1.3 NG/ML

## 2024-11-03 NOTE — PROGRESS NOTES
"Subjective   Patient ID: Dakota Pugh is a 84 y.o. male who presents for Follow-up (Annual follow-up on Osteoporosis. Patient says he's been taking Prolia for about 1-year now, and feels great, no falls. Next injection is scheduled for January of 2025. ).    HPI  Last seen November 2023 severe OSTEOPOROSIS  DEXA WITH T SCORES -4 OR >  MANY MEDICAL ISSUES IN 2023 AND IN REHAB FOR MANY MOS.   AT LAST VISIT SUGGESTED EVENITY FOLLOWED BY [facility]    HAD 1 EVENITY ONLY  PERFORATED GASTRIC ULCER FEB WITH REHAB WITH DIFFICULTY WALKING ..  NEEDS WALKER   PT FOR 3 MOS NOW SARA   SAW ORTHOPED AND TOLD BONES FINE    Next prolia in Jan 2025    PAIN  L>R THIGH IN \"MUSCLE\"  RODS IN BOTH LEGS AND QUIN THR         ROS      Current Outpatient Medications   Medication Sig Dispense Refill    acetaminophen (Tylenol Extra Strength) 500 mg tablet Take 1 tablet (500 mg) by mouth every 8 hours if needed for mild pain (1 - 3).      amoxicillin (Amoxil) 500 mg capsule TAKE 4 CAPSULES BY MOUTH 1 HOUR BEFORE DENTAL APPOINMENT      calcium carbonate (Oscal) 500 mg calcium (1,250 mg) tablet Take 1 tablet (1,250 mg) by mouth once daily.      calcium carbonate-vitamin D3 600 mg-5 mcg (200 unit) tablet Take by mouth twice a day.      cholecalciferol (Vitamin D-3) 25 MCG (1000 UT) tablet Take by mouth.      denosumab (Prolia) 60 mg/mL syringe Inject 1 mL (60 mg total) under the skin every 6 months. 1 mL 1    famotidine (Pepcid) 20 mg tablet Take 1 tablet (20 mg) by mouth 2 times a day. 180 tablet 3    fish oil concentrate (Omega-3) 120-180 mg capsule Take 1 capsule (1 g) by mouth once daily.      pantoprazole (ProtoNix) 40 mg EC tablet Take 1 tablet (40 mg) by mouth once daily in the evening. 90 tablet 3    rosuvastatin (Crestor) 10 mg tablet Take 1 tablet (10 mg) by mouth once daily. 100 tablet 3    vit A/vit C/vit E/zinc/copper (PRESERVISION AREDS ORAL) Take by mouth.       No current facility-administered medications for this visit.         " has a past medical history of Cataract, Dyslipidemia, Iron deficiency, Low BP, Osteoporosis, Other conditions influencing health status, Personal history of malignant neoplasm of bladder (09/13/2018), and Stomach ulcer.   Past Surgical History:   Procedure Laterality Date    BLADDER SURGERY  04/28/2015    Bladder Surgery    OTHER SURGICAL HISTORY  05/30/2020    Shoulder replacement    TONSILLECTOMY  12/11/2013    Tonsillectomy With Adenoidectomy    TOTAL HIP ARTHROPLASTY  12/11/2013    Total Hip Replacement      Social History     Tobacco Use    Smoking status: Never     Passive exposure: Past    Smokeless tobacco: Never   Substance Use Topics    Alcohol use: Yes     Alcohol/week: 3.0 standard drinks of alcohol     Types: 1 Glasses of wine, 1 Cans of beer, 1 Shots of liquor per week     Comment: SOCIAL 2 TO 3    Drug use: Not Currently      family history includes Asthma in his daughter; Breast cancer in his mother; Diabetes in his son; GORAN disease in his son; Glaucoma in his father; HTN in his son; Sleep apnea in his son.  Pain Management Panel           No data to display                 There were no vitals filed for this visit.  Lab Results   Component Value Date    RF <10 12/19/2019    SEDRATE 20 04/19/2022    CRP 0.85 09/09/2023    TSH 1.06 10/03/2024   7- 2023  GFR 53  H/H OK     PHYSICAL EXAM      NODES all stations negative  HEART  LUNGS  ABDOMEN   VASCULAR  NEURO normal muscle strength both proximally and distally of upper and lower extremity  SKIN  JOINTS no active synovitis    PLAN  Diagnoses and all orders for this visit:  Osteoporosis, unspecified osteoporosis type, unspecified pathological fracture presence    Pituitary  tumor to be removed by Neuro surgeon     We discussed his chronic medical issues in some detail  He had a severe gastrointestinal gastric perforation     His symptoms are painful thighs bilaterally only with weightbearing.  He is comfortable sitting or lying down and the pain does not  awaken him at night    To continue Prolia     Next DEXA 2026    See in 1 year

## 2024-11-04 ENCOUNTER — APPOINTMENT (OUTPATIENT)
Dept: RHEUMATOLOGY | Facility: CLINIC | Age: 84
End: 2024-11-04
Payer: MEDICARE

## 2024-11-04 VITALS
OXYGEN SATURATION: 97 % | BODY MASS INDEX: 25.1 KG/M2 | SYSTOLIC BLOOD PRESSURE: 99 MMHG | WEIGHT: 180 LBS | DIASTOLIC BLOOD PRESSURE: 62 MMHG | HEART RATE: 62 BPM

## 2024-11-04 DIAGNOSIS — M81.0 OSTEOPOROSIS, UNSPECIFIED OSTEOPOROSIS TYPE, UNSPECIFIED PATHOLOGICAL FRACTURE PRESENCE: Primary | ICD-10-CM

## 2024-11-04 PROCEDURE — 1159F MED LIST DOCD IN RCRD: CPT | Performed by: INTERNAL MEDICINE

## 2024-11-04 PROCEDURE — 1157F ADVNC CARE PLAN IN RCRD: CPT | Performed by: INTERNAL MEDICINE

## 2024-11-04 PROCEDURE — 99214 OFFICE O/P EST MOD 30 MIN: CPT | Performed by: INTERNAL MEDICINE

## 2024-11-08 ENCOUNTER — TELEPHONE (OUTPATIENT)
Dept: PRIMARY CARE | Facility: CLINIC | Age: 84
End: 2024-11-08
Payer: MEDICARE

## 2024-11-08 DIAGNOSIS — M81.0 OSTEOPOROSIS, UNSPECIFIED OSTEOPOROSIS TYPE, UNSPECIFIED PATHOLOGICAL FRACTURE PRESENCE: Primary | ICD-10-CM

## 2024-11-08 NOTE — TELEPHONE ENCOUNTER
Called the patient regarding his last lipid profile  Does not report any ill effects from the rosuvastatin   In some ways his numbers are better with the lower LDL level, but still not a great ratio - would consider increasing the dose down the road, but will stay the course for now

## 2024-11-08 NOTE — PROGRESS NOTES
Prolia continuation of therapy, updated therapy plan and smart set orders. Next injection scheduled for Jan 2025 for the [facility]

## 2024-11-20 ENCOUNTER — PREP FOR PROCEDURE (OUTPATIENT)
Dept: NEUROSURGERY | Facility: HOSPITAL | Age: 84
End: 2024-11-20
Payer: MEDICARE

## 2024-11-20 DIAGNOSIS — D35.2 PITUITARY ADENOMA (MULTI): Primary | ICD-10-CM
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
## 2024-12-02 ENCOUNTER — CLINICAL SUPPORT (OUTPATIENT)
Dept: PREADMISSION TESTING | Facility: HOSPITAL | Age: 84
End: 2024-12-02
Payer: MEDICARE

## 2024-12-02 NOTE — CPM/PAT H&P
CPM/PAT Evaluation       Name: Dakota Pugh (Dakota Pugh)  /Age: 1940/84 y.o.     { PAT Visit Type:39443}      Chief Complaint: ***    HPI    Dakota Pugh is scheduled for Excision Transsphenoidal Endoscopy Pituitary with Navigation  Endoscopic endonasal transsphenoidal resection of pituitary region neoplasm  Creation Fasciocutaneous Flap Head/Neck  Excision Adipose Tissue Graft Head/Neck  Navigation-Assisted Surgery on 24    Past Medical History:   Diagnosis Date    Cataract     Dyslipidemia     GERD (gastroesophageal reflux disease)     Hyperlipidemia     Iron deficiency     Low BP     Osteoporosis     Other conditions influencing health status     Myalgia and myositis    Personal history of malignant neoplasm of bladder 2018    History of malignant neoplasm of bladder    Stomach ulcer        Past Surgical History:   Procedure Laterality Date    BLADDER SURGERY  2015    Bladder Surgery    TONSILLECTOMY  2013    Tonsillectomy With Adenoidectomy    TOTAL HIP ARTHROPLASTY  2013    Total Hip Replacement    TOTAL SHOULDER ARTHROPLASTY         Patient Sexual activity questions deferred to the physician.    Family History   Problem Relation Name Age of Onset    Breast cancer Mother      Glaucoma Father      Asthma Daughter Jesika     GORAN disease Son Naun     Diabetes Son Naun     Sleep apnea Son Naun     Other (HTN) Son Naun        No Known Allergies    Prior to Admission medications    Medication Sig Start Date End Date Taking? Authorizing Provider   acetaminophen (Tylenol Extra Strength) 500 mg tablet Take 1 tablet (500 mg) by mouth every 8 hours if needed for mild pain (1 - 3).    Historical Provider, MD   amoxicillin (Amoxil) 500 mg capsule TAKE 4 CAPSULES BY MOUTH 1 HOUR BEFORE DENTAL APPOINMENT 24   Historical Provider, MD   calcium carbonate (Oscal) 500 mg calcium (1,250 mg) tablet Take 1 tablet (1,250 mg) by mouth once daily.    Historical Provider, MD   calcium  carbonate-vitamin D3 600 mg-5 mcg (200 unit) tablet Take by mouth twice a day.    Historical Provider, MD   cholecalciferol (Vitamin D-3) 25 MCG (1000 UT) tablet Take by mouth.    Historical Provider, MD   denosumab (Prolia) 60 mg/mL syringe Inject 1 mL (60 mg total) under the skin every 6 months. 23   Cristi Verma DO   famotidine (Pepcid) 20 mg tablet Take 1 tablet (20 mg) by mouth 2 times a day.  Patient taking differently: Take 1 tablet (20 mg) by mouth once daily. 24   Gian Brody MD   fish oil concentrate (Omega-3) 120-180 mg capsule Take 1 capsule (1 g) by mouth once daily. 13   Historical Provider, MD   pantoprazole (ProtoNix) 40 mg EC tablet Take 1 tablet (40 mg) by mouth once daily in the evening. 10/1/24 10/1/25  Rah Tanner MD   rosuvastatin (Crestor) 10 mg tablet Take 1 tablet (10 mg) by mouth once daily. 24  Gian Brody MD   vit A/vit C/vit E/zinc/copper (PRESERVISION AREDS ORAL) Take by mouth.  Patient taking differently: Take by mouth 2 times a day.    Historical Provider, MD CLARISA MCKENNA Physical Exam     Airway      Testing/Diagnostic:         - EK22        - Echo: 22  CONCLUSIONS:   1. Left ventricular systolic function is normal with a 55-60% estimated ejection fraction.   2. RVSP within normal limits.   3. Mild aortic valve regurgitation.   4. There is plaque visualized in the ascending aorta.      - Stress Test: 17  Summary: 1. No clinical, electrocardiographic or echocardiographic evidence for ischemia at a maximal workload. 2. Normal Stress Test. 3. The adequate level of stress was achieved.       - MR Brain: 24  IMPRESSION:  1. There is a sellar and suprasellar mass measuring 2.4 x 2.4 x 3.4  cm which is suggestive of a pituitary macro adenoma. There is mass  effect upon the optic apparatus and suggestion of involvement of the  cavernous sinus on the left. There is abutment of the cavernous sinus  on the right.  2. No  orbital mass. The left optic nerve appears diminutive compared  to the left. No abnormal signal or enhancement within the optic  nerves.  3. No acute intracranial infarct, mass effect or abnormal parenchymal  enhancement.      - CT A/P: 05/07/23  Impression   Stable surgical changes in distal stomach. The stomach was otherwise  grossly intact.     Decreased in the amount of stool, gas, and fluid in the colon since  the previous exam. No longer with inflammation in the left colon or  proximal sigmoid. There is sigmoid diverticulosis and distal left  colon diverticulosis. No large bowel obstruction.     No small bowel dilatation.     There are persistent edematous changes anteriorly in the mid to upper  abdomen anterior to the transverse colon and stomach. These edematous  changes show mild improvement. Small fluid collection anteriorly  along the peritoneal surface in the midline on image 49 of the  previous exam is smaller today and no longer has a central fluid  component. Also, fluid density structures along the lateral left side  of the proximal stomach on the previous exam are smaller today.     Multiple left renal cysts as described. Small upper pole right renal  cyst.     Previous bilateral total hip arthroplasties.       - CT Sinuss: 10/18/24  IMPRESSION:  Intra sellar mass with bony remottling.      The sphenoid sinuses are symmetrically pneumatized in the sphenoid  septum as well as the posterior nasal septum are in the midline.      There is slight deviation of the anterior nasal septum to the right      Minimal bilateral savannah bullosa.          Patient Specialist/PCP:         PCP: Gian Brody 07/22/24 presents for Annual Exam....h/o HLD, peptic ulcer disease, BPH, HLD, osteoporosis, closed fracture of L femur (requiring rehab with use of walker - improving)     Endocrinology: Antoine Denise 10/15/24 evaluation of pituitary function pre-op. 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 MRI on 9/27/24 demonstrating a pituitary region neoplasm.        Rheumatolgy: Cristi Verma 11/04/24 follow-up on Osteoporosis. Managed with [facility]     Neuro Sx: Ernesto Bermudez 10/03/24 presents for surgical evaluation   For incidental findings of bitemporal hemianopsia. Patient was recommended for MR imaging found to have a sellar and suprasellar mass measuring 2.4 x 2.4 x 3.4cm with mass effect on OC         ______________________________________________________________________  Medication instructions:   Instructed to hold Vitamins, Supplements and Ibuprofen 7 days prior to surgery              Luisa Meza LPN  Preadmission Testing              There were no vitals taken for this visit.    DASI Risk Score    No data to display       Caprini DVT Assessment    No data to display       Modified Frailty Index    No data to display       CHADS2 Stroke Risk  Current as of yesterday        N/A 3 to 100%: High Risk   2 to < 3%: Medium Risk   0 to < 2%: Low Risk     Last Change: N/A          This score determines the patient's risk of having a stroke if the patient has atrial fibrillation.        This score is not applicable to this patient. Components are not calculated.          Revised Cardiac Risk Index    No data to display       Apfel Simplified Score    No data to display       Risk Analysis Index Results This Encounter    No data found in the last 10 encounters.       Prodigy: High Risk  Total Score: 24              Prodigy Age Score      Prodigy Gender Score          ARISCAT Score for Postoperative Pulmonary Complications    No data to display       Booth Perioperative Risk for Myocardial Infarction or Cardiac Arrest (WEST)    No data to display         Assessment and Plan:     { PAT EMBEDDED ASSESSMENT AND  PLAN:02268}

## 2024-12-05 ENCOUNTER — PRE-ADMISSION TESTING (OUTPATIENT)
Dept: PREADMISSION TESTING | Facility: HOSPITAL | Age: 84
End: 2024-12-05
Payer: MEDICARE

## 2024-12-05 ENCOUNTER — ANESTHESIA EVENT (OUTPATIENT)
Dept: OPERATING ROOM | Facility: HOSPITAL | Age: 84
End: 2024-12-05
Payer: MEDICARE

## 2024-12-05 VITALS
WEIGHT: 181.8 LBS | SYSTOLIC BLOOD PRESSURE: 96 MMHG | OXYGEN SATURATION: 96 % | DIASTOLIC BLOOD PRESSURE: 55 MMHG | HEIGHT: 70 IN | HEART RATE: 59 BPM | TEMPERATURE: 96.9 F | BODY MASS INDEX: 26.03 KG/M2

## 2024-12-05 DIAGNOSIS — Z01.810 PREOP CARDIOVASCULAR EXAM: ICD-10-CM

## 2024-12-05 DIAGNOSIS — Z01.818 PREOP TESTING: Primary | ICD-10-CM

## 2024-12-05 DIAGNOSIS — Z01.818 PREOPERATIVE CLEARANCE: ICD-10-CM

## 2024-12-05 DIAGNOSIS — Z01.811 PREOP RESPIRATORY EXAM: ICD-10-CM

## 2024-12-05 DIAGNOSIS — D35.2 PITUITARY ADENOMA (MULTI): ICD-10-CM

## 2024-12-05 LAB
ABO GROUP (TYPE) IN BLOOD: NORMAL
ANION GAP SERPL CALC-SCNC: 13 MMOL/L (ref 10–20)
ANTIBODY SCREEN: NORMAL
BUN SERPL-MCNC: 18 MG/DL (ref 6–23)
CALCIUM SERPL-MCNC: 8.8 MG/DL (ref 8.6–10.6)
CHLORIDE SERPL-SCNC: 101 MMOL/L (ref 98–107)
CO2 SERPL-SCNC: 27 MMOL/L (ref 21–32)
CREAT SERPL-MCNC: 0.93 MG/DL (ref 0.5–1.3)
EGFRCR SERPLBLD CKD-EPI 2021: 81 ML/MIN/1.73M*2
ERYTHROCYTE [DISTWIDTH] IN BLOOD BY AUTOMATED COUNT: 14.6 % (ref 11.5–14.5)
GLUCOSE SERPL-MCNC: 87 MG/DL (ref 74–99)
HCT VFR BLD AUTO: 41.8 % (ref 41–52)
HGB BLD-MCNC: 13.8 G/DL (ref 13.5–17.5)
MCH RBC QN AUTO: 29.9 PG (ref 26–34)
MCHC RBC AUTO-ENTMCNC: 33 G/DL (ref 32–36)
MCV RBC AUTO: 91 FL (ref 80–100)
NRBC BLD-RTO: 0 /100 WBCS (ref 0–0)
PLATELET # BLD AUTO: 220 X10*3/UL (ref 150–450)
POTASSIUM SERPL-SCNC: 4.6 MMOL/L (ref 3.5–5.3)
RBC # BLD AUTO: 4.62 X10*6/UL (ref 4.5–5.9)
RH FACTOR (ANTIGEN D): NORMAL
SODIUM SERPL-SCNC: 136 MMOL/L (ref 136–145)
WBC # BLD AUTO: 5.9 X10*3/UL (ref 4.4–11.3)

## 2024-12-05 PROCEDURE — 80048 BASIC METABOLIC PNL TOTAL CA: CPT

## 2024-12-05 PROCEDURE — 93005 ELECTROCARDIOGRAM TRACING: CPT

## 2024-12-05 PROCEDURE — 99204 OFFICE O/P NEW MOD 45 MIN: CPT | Performed by: ANESTHESIOLOGY

## 2024-12-05 PROCEDURE — 85027 COMPLETE CBC AUTOMATED: CPT

## 2024-12-05 PROCEDURE — 36415 COLL VENOUS BLD VENIPUNCTURE: CPT

## 2024-12-05 PROCEDURE — 86901 BLOOD TYPING SEROLOGIC RH(D): CPT

## 2024-12-05 PROCEDURE — 86900 BLOOD TYPING SEROLOGIC ABO: CPT

## 2024-12-05 PROCEDURE — 87081 CULTURE SCREEN ONLY: CPT

## 2024-12-05 RX ORDER — CHLORHEXIDINE GLUCONATE 40 MG/ML
SOLUTION TOPICAL DAILY PRN
Qty: 473 ML | Refills: 0 | Status: SHIPPED | OUTPATIENT
Start: 2024-12-05 | End: 2024-12-10

## 2024-12-05 RX ORDER — CHLORHEXIDINE GLUCONATE ORAL RINSE 1.2 MG/ML
15 SOLUTION DENTAL AS NEEDED
Qty: 120 ML | Refills: 0 | Status: SHIPPED | OUTPATIENT
Start: 2024-12-05 | End: 2024-12-19

## 2024-12-05 ASSESSMENT — DUKE ACTIVITY SCORE INDEX (DASI)
CAN YOU WALK INDOORS, SUCH AS AROUND YOUR HOUSE: YES
CAN YOU DO LIGHT WORK AROUND THE HOUSE LIKE DUSTING OR WASHING DISHES: YES
CAN YOU CLIMB A FLIGHT OF STAIRS OR WALK UP A HILL: NO
CAN YOU DO MODERATE WORK AROUND THE HOUSE LIKE VACUUMING, SWEEPING FLOORS OR CARRYING GROCERIES: YES
CAN YOU PARTICIPATE IN MODERATE RECREATIONAL ACTIVITIES LIKE GOLF, BOWLING, DANCING, DOUBLES TENNIS OR THROWING A BASEBALL OR FOOTBALL: NO
CAN YOU DO HEAVY WORK AROUND THE HOUSE LIKE SCRUBBING FLOORS OR LIFTING AND MOVING HEAVY FURNITURE: NO
CAN YOU RUN A SHORT DISTANCE: NO
CAN YOU TAKE CARE OF YOURSELF (EAT, DRESS, BATHE, OR USE TOILET): YES
CAN YOU DO YARD WORK LIKE RAKING LEAVES, WEEDING OR PUSHING A MOWER: NO
CAN YOU HAVE SEXUAL RELATIONS: NO
TOTAL_SCORE: 10.7
CAN YOU PARTICIPATE IN STRENOUS SPORTS LIKE SWIMMING, SINGLES TENNIS, FOOTBALL, BASKETBALL, OR SKIING: NO
CAN YOU WALK A BLOCK OR TWO ON LEVEL GROUND: NO
DASI METS SCORE: 4.1

## 2024-12-05 ASSESSMENT — ENCOUNTER SYMPTOMS
NEUROLOGICAL NEGATIVE: 1
GASTROINTESTINAL NEGATIVE: 1
CARDIOVASCULAR NEGATIVE: 1
NECK NEGATIVE: 1
ENDOCRINE NEGATIVE: 1
VISUAL CHANGE: 1
CONSTITUTIONAL NEGATIVE: 1
ARTHRALGIAS: 1
RESPIRATORY NEGATIVE: 1

## 2024-12-05 NOTE — H&P (VIEW-ONLY)
CPM/PAT Evaluation       Name: Dakota AGUILA Keaton (Dakota Pugh)  /Age: 1940/84 y.o.     In-Person       83 y/o male scheduled for Endoscopic endonasal transsphenoidal resection of pituitary region neoplasm on 2024 with Dr. Bermudez secondary to Pituitary adenoma.  PMHX includes HLD, Orthostatic Hypotension, Peptic ulcer disease, osteoporosis and Hx of bladder cancer.  Presents to CPM today for perioperative risk stratification and optimization.      Past Medical History:   Diagnosis Date    Arthritis     Bladder cancer (Multi)     stage 1 several years ago, s/p resection    Cataract     Chronic bronchitis (Multi)     seen by Dr. Ernesto Bermudez on 10/03/24    Dyslipidemia     GERD (gastroesophageal reflux disease)     Glaucoma     History of blood transfusion     transfused in     Hyperlipidemia     Iron deficiency     Low BP     Osteoporosis     Other conditions influencing health status     Myalgia and myositis    Pelvic mass     Personal history of malignant neoplasm of bladder 2018    History of malignant neoplasm of bladder    Stomach ulcer        Past Surgical History:   Procedure Laterality Date    BLADDER SURGERY  2015    Bladder Surgery    COLONOSCOPY      FEMUR FRACTURE SURGERY Bilateral     TONSILLECTOMY  2013    Tonsillectomy With Adenoidectomy    TOTAL HIP ARTHROPLASTY Bilateral 2013    Total Hip Replacement    TOTAL SHOULDER ARTHROPLASTY Bilateral        Patient Sexual activity questions deferred to the physician.    Family History   Problem Relation Name Age of Onset    Breast cancer Mother      Glaucoma Father      Asthma Daughter Jesika     GORAN disease Son Naun     Diabetes Son Naun     Sleep apnea Son Naun     Other (HTN) Son Naun        No Known Allergies    Prior to Admission medications    Medication Sig Start Date End Date Taking? Authorizing Provider   acetaminophen (Tylenol Extra Strength) 500 mg tablet Take 1 tablet (500 mg) by mouth every 8 hours if  needed for mild pain (1 - 3).    Historical Provider, MD   amoxicillin (Amoxil) 500 mg capsule TAKE 4 CAPSULES BY MOUTH 1 HOUR BEFORE DENTAL APPOINMENT  Patient not taking: Reported on 12/2/2024 8/19/24   Historical Provider, MD   calcium carbonate (Oscal) 500 mg calcium (1,250 mg) tablet Take 1 tablet (1,250 mg) by mouth once daily.    Historical Provider, MD   calcium carbonate-vitamin D3 600 mg-5 mcg (200 unit) tablet Take by mouth twice a day.    Historical Provider, MD   chlorhexidine (Hibiclens) 4 % external liquid Apply topically once daily as needed for wound care for up to 5 days. 12/5/24 12/10/24  Lama Fanny MD   chlorhexidine (Peridex) 0.12 % solution Use 15 mL in the mouth or throat if needed for wound care for up to 14 days. 12/5/24 12/19/24  Lama Fanny MD   cholecalciferol (Vitamin D-3) 25 MCG (1000 UT) tablet Take by mouth.    Historical Provider, MD   denosumab (Prolia) 60 mg/mL syringe Inject 1 mL (60 mg total) under the skin every 6 months.  Patient not taking: Reported on 12/2/2024 11/14/23   Cristi Verma DO   famotidine (Pepcid) 20 mg tablet Take 1 tablet (20 mg) by mouth 2 times a day.  Patient taking differently: Take 1 tablet (20 mg) by mouth once daily. 8/26/24   Gian Brody MD   fish oil concentrate (Omega-3) 120-180 mg capsule Take 1 capsule (1 g) by mouth once daily. 12/11/13   Historical Provider, MD   pantoprazole (ProtoNix) 40 mg EC tablet Take 1 tablet (40 mg) by mouth once daily in the evening. 10/1/24 10/1/25  Rah Tanner MD   rosuvastatin (Crestor) 10 mg tablet Take 1 tablet (10 mg) by mouth once daily. 7/22/24 8/26/25  Gian Brody MD   vit A/vit C/vit E/zinc/copper (PRESERVISION AREDS ORAL) Take by mouth.  Patient taking differently: Take by mouth 2 times a day.    Historical Provider, MD        PAT ROS:   Constitutional:   neg    Neuro/Psych:   neg    Eyes:    vision loss   visual disturbance  Ears:   neg    Nose:   neg    Mouth:   neg    Throat:   neg    Neck:    neg    Cardio:   neg    Respiratory:   neg    Endocrine:   neg    GI:   neg    :   Musculoskeletal:    arthralgias  Hematologic:   neg    Skin:      Physical Exam  Physical exam within normal limits.          PAT AIRWAY:   Airway:     Mallampati::  II    TM distance::  >3 FB    Neck ROM::  Limited         Testing/Diagnostic:     Patient Specialist/PCP:     Visit Vitals  BP 96/55   Pulse 59   Temp 36.1 °C (96.9 °F)       DASI Risk Score      Flowsheet Row Pre-Admission Testing from 12/5/2024 in [facility] ([facility]   Can you take care of yourself (eat, dress, bathe, or use toilet)?  2.75 filed at 12/05/2024 1307   Can you walk indoors, such as around your house? 1.75 filed at 12/05/2024 1307   Can you walk a block or two on level ground?  0 filed at 12/05/2024 1307   Can you climb a flight of stairs or walk up a hill? 0 filed at 12/05/2024 1307   Can you run a short distance? 0 filed at 12/05/2024 1307   Can you do light work around the house like dusting or washing dishes? 2.7 filed at 12/05/2024 1307   Can you do moderate work around the house like vacuuming, sweeping floors or carrying groceries? 3.5 filed at 12/05/2024 1307   Can you do heavy work around the house like scrubbing floors or lifting and moving heavy furniture?  0 filed at 12/05/2024 1307   Can you do yard work like raking leaves, weeding or pushing a mower? 0 filed at 12/05/2024 1307   Can you have sexual relations? 0 filed at 12/05/2024 1307   Can you participate in moderate recreational activities like golf, bowling, dancing, doubles tennis or throwing a baseball or football? 0 filed at 12/05/2024 1307   Can you participate in strenous sports like swimming, singles tennis, football, basketball, or skiing? 0 filed at 12/05/2024 1307   DASI SCORE 10.7 filed at 12/05/2024 1307   METS Score (Will be calculated only when all the questions are answered) 4.1 filed at 12/05/2024 5401          Capeliseo DVT Assessment    No data to display        Modified Frailty Index    No data to display       CHADS2 Stroke Risk  Current as of 30 minutes ago        N/A 3 to 100%: High Risk   2 to < 3%: Medium Risk   0 to < 2%: Low Risk     Last Change: N/A          This score determines the patient's risk of having a stroke if the patient has atrial fibrillation.        This score is not applicable to this patient. Components are not calculated.          Revised Cardiac Risk Index    No data to display       Apfel Simplified Score    No data to display       Risk Analysis Index Results This Encounter    No data found in the last 10 encounters.       Stop Bang Score      Flowsheet Row Pre-Admission Testing from 12/5/2024 in [facility]   Do you snore loudly? 0 filed at 12/05/2024 1307   Do you often feel tired or fatigued after your sleep? 1 filed at 12/05/2024 1307   Has anyone ever observed you stop breathing in your sleep? 0 filed at 12/05/2024 1307   Do you have or are you being treated for high blood pressure? 0 filed at 12/05/2024 1307   Recent BMI (Calculated) 25.7 filed at 12/05/2024 1307   Is BMI greater than 35 kg/m2? 0=No filed at 12/05/2024 1307   Age older than 50 years old? 1=Yes filed at 12/05/2024 1307   Is your neck circumference greater than 17 inches (Male) or 16 inches (Female)? 0 filed at 12/05/2024 1307   Gender - Male 1=Yes filed at 12/05/2024 1307   STOP-BANG Total Score 3 filed at 12/05/2024 1307          Prodigy: High Risk  Total Score: 24              Prodigy Age Score      Prodigy Gender Score          ARISCAT Score for Postoperative Pulmonary Complications    No data to display       Booth Perioperative Risk for Myocardial Infarction or Cardiac Arrest (WEST)    No data to display         Results for orders placed or performed in visit on 12/05/24 (from the past 24 hours)   CBC   Result Value Ref Range    WBC 5.9 4.4 - 11.3 x10*3/uL    nRBC 0.0 0.0 - 0.0 /100 WBCs    RBC 4.62 4.50 - 5.90 x10*6/uL    Hemoglobin 13.8 13.5 -  17.5 g/dL    Hematocrit 41.8 41.0 - 52.0 %    MCV 91 80 - 100 fL    MCH 29.9 26.0 - 34.0 pg    MCHC 33.0 32.0 - 36.0 g/dL    RDW 14.6 (H) 11.5 - 14.5 %    Platelets 220 150 - 450 x10*3/uL   Basic metabolic panel   Result Value Ref Range    Glucose 87 74 - 99 mg/dL    Sodium 136 136 - 145 mmol/L    Potassium 4.6 3.5 - 5.3 mmol/L    Chloride 101 98 - 107 mmol/L    Bicarbonate 27 21 - 32 mmol/L    Anion Gap 13 10 - 20 mmol/L    Urea Nitrogen 18 6 - 23 mg/dL    Creatinine 0.93 0.50 - 1.30 mg/dL    eGFR 81 >60 mL/min/1.73m*2    Calcium 8.8 8.6 - 10.6 mg/dL     *Note: Due to a large number of results and/or encounters for the requested time period, some results have not been displayed. A complete set of results can be found in Results Review.        Assessment and Plan:    84 y.o.  male  scheduled for Endoscopic endonasal transsphenoidal resection of pituitary region neoplasm on 12/9/2024 with Dr. Bermudez secondary to Pituitary adenoma.  PMHX includes HLD, Orthostatic Hypotension, Peptic ulcer disease, osteoporosis and Hx of bladder cancer.  Presents to [facility] today for perioperative risk stratification and optimization.    Neuro:  No neurologic diagnosis, however, the patient is at increased risk for perioperative delirium secondary to  age, decreased functional status, type and duration of surgery, Patient instructed on and provided cognitive exercises  Patient is at increased risk for perioperative CVA secondary to  increased age, operative time > 2.5 hours    HEENT:  No HEENT diagnosis or significant findings on chart review or clinical presentation and evaluation. No further preoperative testing/intervention indicated at this time.    Cardiovascular:  No CV diagnosis or significant findings on chart review or clinical presentation and evaluation. No further preoperative testing or intervention is indicated at this time.  METS: 10.7 self reported  RCRI: 0 points, 3.9%  risk for postoperative MACE   WEST: 0.25% risk  for 30 day postoperative MACE  EKG - Bradycardia, HR 53, First degree heart block    Echo 11/2022:  CONCLUSIONS:   1. Left ventricular systolic function is normal with a 55-60% estimated ejection fraction.   2. RVSP within normal limits.   3. Mild aortic valve regurgitation.   4. There is plaque visualized in the ascending aorta.    Stress test 12/2017:  Summary:   1. No clinical, electrocardiographic or echocardiographic evidence for ischemia at a maximal workload.   2. Normal Stress Test.   3. The adequate level of stress was achieved.    Pulmonary:  No pulmonary diagnosis, however patient is at increased risk of perioperative complications secondary to  age > 60, functional dependence, major surgery, duration of surgery > 2 hours, types of anesthetic  Stop Bang score is 3 placing patient at mild risk for FATEMEH  ARISCAT: 26-44 points, 13.3% risk of in-hospital postoperative pulmonary complication  PRODIGY: High risk for opioid induced respiratory depression    Renal:   No renal diagnosis or significant findings on chart review, clinical presentation or evaluation. No further preoperative testing/intervention is indicated at this time.  Age equal to or greater than 56  Pt at Moderate risk for perioperative CINDI based on Dynamic Predictive Scoring Tool for Perioperative CINDI     Endocrine:  Pituitary adenoma, with compressive symptoms     Hematologic:  No hematologic diagnosis, however patient is at an increased risk for DVT  Caprini Score 9, patient at High risk for perioperative DVT.  Patient provided with VTE education/handout.    Gastrointestinal:   Peptic ulcer disease, on PPI and famotidine   Eat-10 score 0  Apfel 2    Infectious disease:   No infectious diagnosis or significant findings on chart review or clinical presentation and evaluation.   Prescription provided for CHG body wash and dental rinse. CHG use instructions reviewed and provided to patient.  Staph screen collected    Musculoskeletal:    Osteoporosis, osteoarthritis.    Anesthesia/Airway:  No prior anesthesia      Medication instructions and NPO guidelines reviewed with the patient.  All questions or concerns discussed and addressed.      Patient seen and staffed with Dr. Rader

## 2024-12-05 NOTE — PREPROCEDURE INSTRUCTIONS
Medication List            Accurate as of December 5, 2024  1:33 PM. Always use your most recent med list.                amoxicillin 500 mg capsule  Commonly known as: Amoxil  Medication Adjustments for Surgery: Take/Use as prescribed     calcium carbonate 500 mg calcium (1,250 mg) tablet  Commonly known as: Oscal  Additional Medication Adjustments for Surgery: Take last dose 7 days before surgery     calcium carbonate-vitamin D3 600 mg-5 mcg (200 unit) tablet  Additional Medication Adjustments for Surgery: Take last dose 7 days before surgery     * chlorhexidine 4 % external liquid  Commonly known as: Hibiclens  Apply topically once daily as needed for wound care for up to 5 days.  Medication Adjustments for Surgery: Take/Use as prescribed     * chlorhexidine 0.12 % solution  Commonly known as: Peridex  Use 15 mL in the mouth or throat if needed for wound care for up to 14 days.  Medication Adjustments for Surgery: Take/Use as prescribed     cholecalciferol 25 MCG (1000 UT) tablet  Commonly known as: Vitamin D-3  Additional Medication Adjustments for Surgery: Take last dose 7 days before surgery     denosumab 60 mg/mL syringe  Commonly known as: Prolia  Inject 1 mL (60 mg total) under the skin every 6 months.  Medication Adjustments for Surgery: Take/Use as prescribed     famotidine 20 mg tablet  Commonly known as: Pepcid  Take 1 tablet (20 mg) by mouth 2 times a day.  Medication Adjustments for Surgery: Take/Use as prescribed     fish oil concentrate 120-180 mg capsule  Commonly known as: Omega-3  Additional Medication Adjustments for Surgery: Take last dose 7 days before surgery     pantoprazole 40 mg EC tablet  Commonly known as: ProtoNix  Take 1 tablet (40 mg) by mouth once daily in the evening.  Medication Adjustments for Surgery: Take/Use as prescribed     PRESERVISION AREDS ORAL  Medication Adjustments for Surgery: Take/Use as prescribed     rosuvastatin 10 mg tablet  Commonly known as: Crestor  Take 1  tablet (10 mg) by mouth once daily.  Medication Adjustments for Surgery: Take/Use as prescribed     Tylenol Extra Strength 500 mg tablet  Generic drug: acetaminophen  Medication Adjustments for Surgery: Take/Use as prescribed           * This list has 2 medication(s) that are the same as other medications prescribed for you. Read the directions carefully, and ask your doctor or other care provider to review them with you.                                    Preoperative Fasting Guidelines    Why must I stop eating and drinking near surgery time?  With sedation, food or liquid in your stomach can enter your lungs causing serious complications  Increases nausea and vomiting    When do I need to stop eating and drinking before my surgery?  Do not eat any food after midnight the night before your surgery/procedure.  You may have up to 13.5 ounces of clear liquid until TWO hours before your instructed arrival time to the hospital.  This includes water, black tea/coffee, (no milk or cream) apple juice, and electrolyte drinks (Gatorade)  You may chew gum until TWO hours before your surgery/procedure      Patient Information: Pre-Operative Infection Prevention Measures     Why did I have my nose, under my arms, and groin swabbed?  The purpose of the swab is to identify Staphylococcus aureus inside your nose or on your skin.  The swab was sent to the laboratory for culture.  A positive swab/culture for Staphylococcus aureus is called colonization or carriage.      What is Staphylococcus aureus?  Staphylococcus aureus, also known as “staph”, is a germ found on the skin or in the nose of healthy people.  Sometimes Staphylococcus aureus can get into the body and cause an infection.  This can be minor (such as pimples, boils, or other skin problems).  It might also be serious (such as a blood infection, pneumonia, or a surgical site infection).    What is Staphylococcus aureus colonization or carriage?  Colonization or carriage  means that a person has the germ but is not sick from it.  These bacteria can be spread on the hands or when breathing or sneezing.    How is Staphylococcus aureus spread?  It is most often spread by close contact with a person or item that carries it.    What happens if my culture is positive for Staphylococcus aureus?  Your doctor/medical team will use this information to guide any antibiotic treatment which may be necessary.  Regardless of the culture results, we will clean the inside of your nose with a betadine swab just before you have your surgery.      Will I get an infection if I have Staphylococcus aureus in my nose or on my skin?  Anyone can get an infection with Staphylococcus aureus.  However, the best way to reduce your risk of infection is to follow the instructions provided to you for the use of your CHG soap and dental rinse.        Patient Information: Oral/Dental Rinse    What is oral/dental rinse?   It is a mouthwash. It is a way of cleaning the mouth with a germ-killing solution before your surgery.  The solution contains chlorhexidine, commonly known as CHG.   It is used inside the mouth to kill a bacteria known as Staphylococcus aureus.  Let your doctor know if you are allergic to Chlorhexidine.    Why do I need to use CHG oral/dental rinse?  The CHG oral/dental rinse helps to kill a bacteria in your mouth known as Staphylococcus aureus.     This reduces the risk of infection at the surgical site.      Using your CHG oral/dental rinse  STEPS:  Use your CHG oral/dental rinse after you brush your teeth the night before (at bedtime) and the morning of your surgery.  Follow all directions on your prescription label.    Use the cap on the container to measure 15ml   Swish (gargle if you can) the mouthwash in your mouth for at least 30 seconds, (do not swallow) and spit out  After you use your CHG rinse, do not rinse your mouth with water, drink or eat.  Please refer to the prescription label for  the appropriate time to resume oral intake      What side effects might I have using the CHG oral/dental rinse?  CHG rinse will stick to plaque on the teeth.  Brush and floss just before use.  Teeth brushing will help avoid staining of plaque during use.      Patient Information: Home Preoperative Antibacterial Shower      What is a home preoperative antibacterial shower?  This shower is a way of cleaning the skin with a germ-killing solution before surgery.  The solution contains chlorhexidine, commonly known as CHG.  CHG is a skin cleanser with germ-killing ability.  Let your doctor know if you are allergic to chlorhexidine.    Why do I need to take a preoperative antibacterial shower?  Skin is not sterile.  It is best to try to make your skin as free of germs as possible before surgery.  Proper cleansing with a germ-killing soap before surgery can lower the number of germs on your skin.  This helps to reduce the risk of infection at the surgical site.  Following the instructions listed below will help you prepare your skin for surgery.      How do I use the solution?  Steps:  Begin using your CHG soap 5 days before your scheduled surgery.  First, wash and rinse your hair using the CHG soap. Keep CHG soap away from ear canals and eyes.  Rinse completely, do not condition.  Hair extensions should be removed.  Wash your face with your normal soap and rinse.    Apply the CHG solution to a clean wet washcloth.  Turn the water off or move away from the water spray to avoid premature rinsing of the CHG soap as you are applying.   Firmly lather your entire body from the neck down.  Do not use on your face.  Pay special attention to the area(s) where your incision(s) will be located unless they are on your face.  Avoid scrubbing your skin too hard.  The important point is to have the CHG soap sit on your skin for 3 minutes.    When the 3 minutes are up, turn on the water and rinse the CHG solution off your body  completely.   DO NOT wash with regular soap after you have used the CHG soap solution  Pat yourself dry with a clean, freshly-laundered towel.  DO NOT apply powders, deodorants, or lotions.  Dress in clean, freshly laundered nightclothes.    Be sure to sleep with clean, freshly laundered sheets.  Be aware that CHG will cause stains on fabrics; if you wash them with bleach after use.  Rinse your washcloth and other linens that have contact with CHG completely.  Use only non-chlorine detergents to launder the items used.   The morning of surgery is the fifth day.  Repeat the above steps and dress in clean comfortable clothing     Whom should I contact if I have any questions regarding the use of CHG soap?  Call the [facility] at [phone] if you have any questions.                 Thank you for visiting [facility] today for your pre-procedure evaluation.    This summary includes instructions and information to aid you during your perioperative period.  Please read carefully. If you have any questions about your visit today, please call the number listed above.  If you become ill or have any changes to your health before your surgery, please contact your primary care provider and alert your surgeon.    Preparing for your Surgery       Exercises  Preoperative Deep Breathing Exercises  Why it is important to do deep breathing exercises before my surgery?  Deep breathing exercises strengthen your breathing muscles.  This helps you to recover after your surgery and decreases the chance of breathing complications.  How are the deep breathing exercises done?  Sit straight with your back supported.  Breathe in deeply and slowly through your nose. Your lower rib cage should expand and your abdomen may move forward.  Hold that breath for 3 to 5 seconds.  Breathe out through pursed lips, slowly and completely.  Rest and  repeat 10 times every hour while awake.  Rest longer if you become dizzy or lightheaded.      Preoperative Brain Exercises    What are brain exercises?  A brain exercise is any activity that engages your thinking (cognitive) skills.    What types of activities are considered brain exercises?  Jigsaw puzzles, crossword puzzles, word jumble, memory games, word search, and many more.  Many can be found free online or on your phone via a mobile bhavesh.    Why should I do brain exercises before my surgery?  More recent research has shown brain exercise before surgery can lower the risk of postoperative delirium (confusion) which can be especially important for older adults.  Patients who did brain exercises for 5 to 10 hours the days before surgery, cut their risk of postoperative delirium in half up to 1 week after surgery.    Sit-to-Stand Exercise    What is the sit-to-stand exercise?  The sit-to-stand exercise strengthens the muscles of your lower body and muscles in the center of your body (core muscles for stability) helping to maintain and improve your strength and mobility.  How do I do the sit-to-stand exercise?  The goal is to do this exercise without using your arms or hands.  If this is too difficult, use your arms and hands or a chair with armrests to help slowly push yourself to the standing position and lower yourself back to the sitting position. As the movement becomes easier use your arms and hands less.    Steps to the sit-to-stand exercise  Sit up tall in a sturdy chair, knees bent, feet flat on the floor shoulder-width apart.  Shift your hips/pelvis forward in the chair to correctly position yourself for the next movement.  Lean forward at your hips.  Stand up straight putting equal weight on both feet.  Check to be sure you are properly aligned with the chair, in a slow controlled movement sit back down.  Repeat this exercise 10-15 times.  If needed you can do it fewer times until your strength  improves.  Rest for 1 minute.  Do another 10-15 sit-to-stand exercises.  Try to do this in the morning and evening.     Tobacco and Alcohol;  Do not drink alcohol or smoke within 24 hours of surgery.  It is best to quit smoking for as long as possible before any surgery or procedure.      The Week before Surgery        Seven days before Surgery  Check your [facility] medication instructions  Do the exercises provided to you by [facility]   Arrange for a responsible, adult licensed  to take you home after surgery and stay with you for 24 hours.  You will not be permitted to drive yourself home if you have received any anesthetic/sedation  Six days before surgery  Check your [facility] medication instructions  Do the exercises provided to you by [facility]   Start using Chlorhexidene (CHG) body wash if prescribed  Five days before surgery  Check your [facility] medication instructions  Do the exercises provided to you by [facility]   Continue to use CHG body wash if prescribed  Three days before surgery  Check your [facility] medication instructions  Do the exercises provided to you by [facility]   Continue to use CHG body wash if prescribed  Two days before surgery  Check your [facility] medication instructions  Do the exercises provided to you by [facility]   Continue to use CHG body wash if prescribed    The Day before Surgery       Check your [facility] medication and all other [facility] instructions including when to stop eating and drinking  You will be called with your arrival time for surgery in the late afternoon.  If you do not receive a call please reach out to your surgeon's office.  Do not smoke or drink 24 hours before surgery  Prepare items to bring with you to the hospital  Shower with your chlorhexidine wash if prescribed  Brush your teeth and use your chlorhexidine dental rinse if prescribed    The Day of Surgery       Check your [facility] medication instructions  Ensure you follow the instructions for when to stop eating and drinking  Shower, if prescribed use CHG.  Do not apply  any lotions, creams, moisturizers, perfume or deodorant  Brush your teeth and use your CHG dental rinse if prescribed  Wear loose comfortable clothing  Avoid make-up  Remove  jewelry and piercings, consider professional piercing removal with a plastic spacer if needed  Bring photo ID and Insurance card  Bring an accurate medication list that includes medication dose, frequency and allergies  Bring a copy of your advanced directives (will, health care power of )  Bring any devices and controllers as well as medical devices you have been provided with for surgery (CPAP, slings, braces, etc.)  Dentures, eyeglasses, and contacts will be removed before surgery, please bring cases for contacts or glasses

## 2024-12-05 NOTE — CPM/PAT H&P
CPM/PAT Evaluation       Name: Dakota AGUILA Keaton (Dakota Pugh)  /Age: 1940/84 y.o.     In-Person       85 y/o male scheduled for Endoscopic endonasal transsphenoidal resection of pituitary region neoplasm on 2024 with Dr. Bermudez secondary to Pituitary adenoma.  PMHX includes HLD, Orthostatic Hypotension, Peptic ulcer disease, osteoporosis and Hx of bladder cancer.  Presents to CPM today for perioperative risk stratification and optimization.      Past Medical History:   Diagnosis Date    Arthritis     Bladder cancer (Multi)     stage 1 several years ago, s/p resection    Cataract     Chronic bronchitis (Multi)     seen by Dr. Ernesto Bermudez on 10/03/24    Dyslipidemia     GERD (gastroesophageal reflux disease)     Glaucoma     History of blood transfusion     transfused in     Hyperlipidemia     Iron deficiency     Low BP     Osteoporosis     Other conditions influencing health status     Myalgia and myositis    Pelvic mass     Personal history of malignant neoplasm of bladder 2018    History of malignant neoplasm of bladder    Stomach ulcer        Past Surgical History:   Procedure Laterality Date    BLADDER SURGERY  2015    Bladder Surgery    COLONOSCOPY      FEMUR FRACTURE SURGERY Bilateral     TONSILLECTOMY  2013    Tonsillectomy With Adenoidectomy    TOTAL HIP ARTHROPLASTY Bilateral 2013    Total Hip Replacement    TOTAL SHOULDER ARTHROPLASTY Bilateral        Patient Sexual activity questions deferred to the physician.    Family History   Problem Relation Name Age of Onset    Breast cancer Mother      Glaucoma Father      Asthma Daughter Jesika     GORAN disease Son Naun     Diabetes Son Naun     Sleep apnea Son Naun     Other (HTN) Son Naun        No Known Allergies    Prior to Admission medications    Medication Sig Start Date End Date Taking? Authorizing Provider   acetaminophen (Tylenol Extra Strength) 500 mg tablet Take 1 tablet (500 mg) by mouth every 8 hours if  needed for mild pain (1 - 3).    Historical Provider, MD   amoxicillin (Amoxil) 500 mg capsule TAKE 4 CAPSULES BY MOUTH 1 HOUR BEFORE DENTAL APPOINMENT  Patient not taking: Reported on 12/2/2024 8/19/24   Historical Provider, MD   calcium carbonate (Oscal) 500 mg calcium (1,250 mg) tablet Take 1 tablet (1,250 mg) by mouth once daily.    Historical Provider, MD   calcium carbonate-vitamin D3 600 mg-5 mcg (200 unit) tablet Take by mouth twice a day.    Historical Provider, MD   chlorhexidine (Hibiclens) 4 % external liquid Apply topically once daily as needed for wound care for up to 5 days. 12/5/24 12/10/24  Lama Fanny MD   chlorhexidine (Peridex) 0.12 % solution Use 15 mL in the mouth or throat if needed for wound care for up to 14 days. 12/5/24 12/19/24  Lama Fanny MD   cholecalciferol (Vitamin D-3) 25 MCG (1000 UT) tablet Take by mouth.    Historical Provider, MD   denosumab (Prolia) 60 mg/mL syringe Inject 1 mL (60 mg total) under the skin every 6 months.  Patient not taking: Reported on 12/2/2024 11/14/23   Cristi Verma DO   famotidine (Pepcid) 20 mg tablet Take 1 tablet (20 mg) by mouth 2 times a day.  Patient taking differently: Take 1 tablet (20 mg) by mouth once daily. 8/26/24   Gian Brody MD   fish oil concentrate (Omega-3) 120-180 mg capsule Take 1 capsule (1 g) by mouth once daily. 12/11/13   Historical Provider, MD   pantoprazole (ProtoNix) 40 mg EC tablet Take 1 tablet (40 mg) by mouth once daily in the evening. 10/1/24 10/1/25  Rah Tanner MD   rosuvastatin (Crestor) 10 mg tablet Take 1 tablet (10 mg) by mouth once daily. 7/22/24 8/26/25  Gian Brody MD   vit A/vit C/vit E/zinc/copper (PRESERVISION AREDS ORAL) Take by mouth.  Patient taking differently: Take by mouth 2 times a day.    Historical Provider, MD        PAT ROS:   Constitutional:   neg    Neuro/Psych:   neg    Eyes:    vision loss   visual disturbance  Ears:   neg    Nose:   neg    Mouth:   neg    Throat:   neg    Neck:    neg    Cardio:   neg    Respiratory:   neg    Endocrine:   neg    GI:   neg    :   Musculoskeletal:    arthralgias  Hematologic:   neg    Skin:      Physical Exam  Physical exam within normal limits.          PAT AIRWAY:   Airway:     Mallampati::  II    TM distance::  >3 FB    Neck ROM::  Limited         Testing/Diagnostic:     Patient Specialist/PCP:     Visit Vitals  BP 96/55   Pulse 59   Temp 36.1 °C (96.9 °F)       DASI Risk Score      Flowsheet Row Pre-Admission Testing from 12/5/2024 in [facility]   Can you take care of yourself (eat, dress, bathe, or use toilet)?  2.75 filed at 12/05/2024 1307   Can you walk indoors, such as around your house? 1.75 filed at 12/05/2024 1307   Can you walk a block or two on level ground?  0 filed at 12/05/2024 1307   Can you climb a flight of stairs or walk up a hill? 0 filed at 12/05/2024 1307   Can you run a short distance? 0 filed at 12/05/2024 1307   Can you do light work around the house like dusting or washing dishes? 2.7 filed at 12/05/2024 1307   Can you do moderate work around the house like vacuuming, sweeping floors or carrying groceries? 3.5 filed at 12/05/2024 1307   Can you do heavy work around the house like scrubbing floors or lifting and moving heavy furniture?  0 filed at 12/05/2024 1307   Can you do yard work like raking leaves, weeding or pushing a mower? 0 filed at 12/05/2024 1307   Can you have sexual relations? 0 filed at 12/05/2024 1307   Can you participate in moderate recreational activities like golf, bowling, dancing, doubles tennis or throwing a baseball or football? 0 filed at 12/05/2024 1307   Can you participate in strenous sports like swimming, singles tennis, football, basketball, or skiing? 0 filed at 12/05/2024 1307   DASI SCORE 10.7 filed at 12/05/2024 1307   METS Score (Will be calculated only when all the questions are answered) 4.1 filed at 12/05/2024 7446          Capeliseo DVT Assessment    No data to display        Modified Frailty Index    No data to display       CHADS2 Stroke Risk  Current as of 30 minutes ago        N/A 3 to 100%: High Risk   2 to < 3%: Medium Risk   0 to < 2%: Low Risk     Last Change: N/A          This score determines the patient's risk of having a stroke if the patient has atrial fibrillation.        This score is not applicable to this patient. Components are not calculated.          Revised Cardiac Risk Index    No data to display       Apfel Simplified Score    No data to display       Risk Analysis Index Results This Encounter    No data found in the last 10 encounters.       Stop Bang Score      Flowsheet Row Pre-Admission Testing from 12/5/2024 in [facility]   Do you snore loudly? 0 filed at 12/05/2024 1307   Do you often feel tired or fatigued after your sleep? 1 filed at 12/05/2024 1307   Has anyone ever observed you stop breathing in your sleep? 0 filed at 12/05/2024 1307   Do you have or are you being treated for high blood pressure? 0 filed at 12/05/2024 1307   Recent BMI (Calculated) 25.7 filed at 12/05/2024 1307   Is BMI greater than 35 kg/m2? 0=No filed at 12/05/2024 1307   Age older than 50 years old? 1=Yes filed at 12/05/2024 1307   Is your neck circumference greater than 17 inches (Male) or 16 inches (Female)? 0 filed at 12/05/2024 1307   Gender - Male 1=Yes filed at 12/05/2024 1307   STOP-BANG Total Score 3 filed at 12/05/2024 1307          Prodigy: High Risk  Total Score: 24              Prodigy Age Score      Prodigy Gender Score          ARISCAT Score for Postoperative Pulmonary Complications    No data to display       Booth Perioperative Risk for Myocardial Infarction or Cardiac Arrest (WEST)    No data to display         Results for orders placed or performed in visit on 12/05/24 (from the past 24 hours)   CBC   Result Value Ref Range    WBC 5.9 4.4 - 11.3 x10*3/uL    nRBC 0.0 0.0 - 0.0 /100 WBCs    RBC 4.62 4.50 - 5.90 x10*6/uL    Hemoglobin 13.8 13.5 -  17.5 g/dL    Hematocrit 41.8 41.0 - 52.0 %    MCV 91 80 - 100 fL    MCH 29.9 26.0 - 34.0 pg    MCHC 33.0 32.0 - 36.0 g/dL    RDW 14.6 (H) 11.5 - 14.5 %    Platelets 220 150 - 450 x10*3/uL   Basic metabolic panel   Result Value Ref Range    Glucose 87 74 - 99 mg/dL    Sodium 136 136 - 145 mmol/L    Potassium 4.6 3.5 - 5.3 mmol/L    Chloride 101 98 - 107 mmol/L    Bicarbonate 27 21 - 32 mmol/L    Anion Gap 13 10 - 20 mmol/L    Urea Nitrogen 18 6 - 23 mg/dL    Creatinine 0.93 0.50 - 1.30 mg/dL    eGFR 81 >60 mL/min/1.73m*2    Calcium 8.8 8.6 - 10.6 mg/dL     *Note: Due to a large number of results and/or encounters for the requested time period, some results have not been displayed. A complete set of results can be found in Results Review.        Assessment and Plan:    84 y.o.  male  scheduled for Endoscopic endonasal transsphenoidal resection of pituitary region neoplasm on 12/9/2024 with Dr. Bermudez secondary to Pituitary adenoma.  PMHX includes HLD, Orthostatic Hypotension, Peptic ulcer disease, osteoporosis and Hx of bladder cancer.  Presents to [facility] today for perioperative risk stratification and optimization.    Neuro:  No neurologic diagnosis, however, the patient is at increased risk for perioperative delirium secondary to  age, decreased functional status, type and duration of surgery, Patient instructed on and provided cognitive exercises  Patient is at increased risk for perioperative CVA secondary to  increased age, operative time > 2.5 hours    HEENT:  No HEENT diagnosis or significant findings on chart review or clinical presentation and evaluation. No further preoperative testing/intervention indicated at this time.    Cardiovascular:  No CV diagnosis or significant findings on chart review or clinical presentation and evaluation. No further preoperative testing or intervention is indicated at this time.  METS: 10.7 self reported  RCRI: 0 points, 3.9%  risk for postoperative MACE   WEST: 0.25% risk  for 30 day postoperative MACE  EKG - Bradycardia, HR 53, First degree heart block    Echo 11/2022:  CONCLUSIONS:   1. Left ventricular systolic function is normal with a 55-60% estimated ejection fraction.   2. RVSP within normal limits.   3. Mild aortic valve regurgitation.   4. There is plaque visualized in the ascending aorta.    Stress test 12/2017:  Summary:   1. No clinical, electrocardiographic or echocardiographic evidence for ischemia at a maximal workload.   2. Normal Stress Test.   3. The adequate level of stress was achieved.    Pulmonary:  No pulmonary diagnosis, however patient is at increased risk of perioperative complications secondary to  age > 60, functional dependence, major surgery, duration of surgery > 2 hours, types of anesthetic  Stop Bang score is 3 placing patient at mild risk for FATEMEH  ARISCAT: 26-44 points, 13.3% risk of in-hospital postoperative pulmonary complication  PRODIGY: High risk for opioid induced respiratory depression    Renal:   No renal diagnosis or significant findings on chart review, clinical presentation or evaluation. No further preoperative testing/intervention is indicated at this time.  Age equal to or greater than 56  Pt at Moderate risk for perioperative CINDI based on Dynamic Predictive Scoring Tool for Perioperative CINDI     Endocrine:  Pituitary adenoma, with compressive symptoms     Hematologic:  No hematologic diagnosis, however patient is at an increased risk for DVT  Caprini Score 9, patient at High risk for perioperative DVT.  Patient provided with VTE education/handout.    Gastrointestinal:   Peptic ulcer disease, on PPI and famotidine   Eat-10 score 0  Apfel 2    Infectious disease:   No infectious diagnosis or significant findings on chart review or clinical presentation and evaluation.   Prescription provided for CHG body wash and dental rinse. CHG use instructions reviewed and provided to patient.  Staph screen collected    Musculoskeletal:    Osteoporosis, osteoarthritis.    Anesthesia/Airway:  No prior anesthesia      Medication instructions and NPO guidelines reviewed with the patient.  All questions or concerns discussed and addressed.      Patient seen and staffed with Dr. Rader

## 2024-12-06 NOTE — PROGRESS NOTES
Pharmacy Medication History Review    Dakota Pugh is a 84 y.o. male who is planned to be admitted for Pituitary adenoma (Multi). Pharmacy called the patient prior to their scheduled procedure and reviewed the patient's [id number] medications for accuracy.    Medications ADDED:  none  Medications CHANGED:  none  Medications REMOVED:   none    Please review updated prior to admission medication list and comments regarding how patient may be taking medications differently by going to Admission tab --> Admission Orders --> Admit Orders / Review prior to admission medications.     Preferred pharmacy, last doses of medications, and allergies to be confirmed with patient by nursing the day of procedure.     Sources used to complete the med history include:  [facility]  Pharmacy dispense history  Patient interview  Chart Review  Care Everywhere     Below are additional concerns with the patient's PTA list.  Patient confirmed they are only taking #1 tablet of famotidine 20mg in the morning as documented on 12/02/24. L.F. 08/26/24 #180/90d    Shelby Taylor   [facility]  Please reach out via Secure Chat for questions

## 2024-12-07 LAB — STAPHYLOCOCCUS SPEC CULT: NORMAL

## 2024-12-09 ENCOUNTER — ANESTHESIA (OUTPATIENT)
Dept: OPERATING ROOM | Facility: HOSPITAL | Age: 84
End: 2024-12-09
Payer: MEDICARE

## 2024-12-09 ENCOUNTER — HOSPITAL ENCOUNTER (INPATIENT)
Facility: HOSPITAL | Age: 84
LOS: 2 days | Discharge: HOME | DRG: 615 | End: 2024-12-11
Attending: NEUROLOGICAL SURGERY | Admitting: NEUROLOGICAL SURGERY
Payer: MEDICARE

## 2024-12-09 DIAGNOSIS — D35.2 PITUITARY ADENOMA (MULTI): ICD-10-CM

## 2024-12-09 DIAGNOSIS — E23.7 PITUITARY ABNORMALITY (MULTI): Primary | ICD-10-CM

## 2024-12-09 DIAGNOSIS — G89.18 ACUTE POST-OPERATIVE PAIN: ICD-10-CM

## 2024-12-09 LAB
ABO GROUP (TYPE) IN BLOOD: NORMAL
ALBUMIN SERPL BCP-MCNC: 3.3 G/DL (ref 3.4–5)
ALBUMIN SERPL BCP-MCNC: 3.5 G/DL (ref 3.4–5)
ALBUMIN SERPL BCP-MCNC: 3.6 G/DL (ref 3.4–5)
ALBUMIN SERPL BCP-MCNC: 3.7 G/DL (ref 3.4–5)
ALBUMIN SERPL BCP-MCNC: 3.9 G/DL (ref 3.4–5)
ANION GAP BLDA CALCULATED.4IONS-SCNC: 11 MMO/L (ref 10–25)
ANION GAP SERPL CALC-SCNC: 10 MMOL/L (ref 10–20)
ANION GAP SERPL CALC-SCNC: 11 MMOL/L (ref 10–20)
ANION GAP SERPL CALC-SCNC: 11 MMOL/L (ref 10–20)
ANION GAP SERPL CALC-SCNC: 12 MMOL/L (ref 10–20)
ANION GAP SERPL CALC-SCNC: 12 MMOL/L (ref 10–20)
BASE EXCESS BLDA CALC-SCNC: -3.2 MMOL/L (ref -2–3)
BODY TEMPERATURE: 37 DEGREES CELSIUS
BUN SERPL-MCNC: 19 MG/DL (ref 6–23)
BUN SERPL-MCNC: 20 MG/DL (ref 6–23)
BUN SERPL-MCNC: 21 MG/DL (ref 6–23)
BUN SERPL-MCNC: 23 MG/DL (ref 6–23)
BUN SERPL-MCNC: 25 MG/DL (ref 6–23)
CA-I BLDA-SCNC: 1.15 MMOL/L (ref 1.1–1.33)
CALCIUM SERPL-MCNC: 7.8 MG/DL (ref 8.6–10.6)
CALCIUM SERPL-MCNC: 8 MG/DL (ref 8.6–10.6)
CALCIUM SERPL-MCNC: 8.1 MG/DL (ref 8.6–10.6)
CALCIUM SERPL-MCNC: 8.3 MG/DL (ref 8.6–10.6)
CALCIUM SERPL-MCNC: 8.3 MG/DL (ref 8.6–10.6)
CHLORIDE BLDA-SCNC: 108 MMOL/L (ref 98–107)
CHLORIDE SERPL-SCNC: 105 MMOL/L (ref 98–107)
CHLORIDE SERPL-SCNC: 108 MMOL/L (ref 98–107)
CHLORIDE SERPL-SCNC: 109 MMOL/L (ref 98–107)
CHLORIDE UR-SCNC: 109 MMOL/L
CHLORIDE UR-SCNC: 97 MMOL/L
CHLORIDE/CREATININE (MMOL/G) IN URINE: 121 MMOL/G CREAT (ref 23–275)
CHLORIDE/CREATININE (MMOL/G) IN URINE: 72 MMOL/G CREAT (ref 23–275)
CO2 SERPL-SCNC: 24 MMOL/L (ref 21–32)
CO2 SERPL-SCNC: 24 MMOL/L (ref 21–32)
CO2 SERPL-SCNC: 25 MMOL/L (ref 21–32)
CO2 SERPL-SCNC: 25 MMOL/L (ref 21–32)
CO2 SERPL-SCNC: 26 MMOL/L (ref 21–32)
CORTIS SERPL-MCNC: 12.5 UG/DL (ref 2.5–20)
CORTIS SERPL-MCNC: 19.3 UG/DL (ref 2.5–20)
CORTIS SERPL-MCNC: 27.1 UG/DL (ref 2.5–20)
CORTIS SERPL-MCNC: 34.6 UG/DL (ref 2.5–20)
CREAT SERPL-MCNC: 0.81 MG/DL (ref 0.5–1.3)
CREAT SERPL-MCNC: 0.87 MG/DL (ref 0.5–1.3)
CREAT SERPL-MCNC: 0.94 MG/DL (ref 0.5–1.3)
CREAT SERPL-MCNC: 0.95 MG/DL (ref 0.5–1.3)
CREAT SERPL-MCNC: 1.05 MG/DL (ref 0.5–1.3)
CREAT UR-MCNC: 134.3 MG/DL (ref 20–370)
CREAT UR-MCNC: 89.8 MG/DL (ref 20–370)
DHEA-S SERPL-MCNC: 5 UG/DL (ref 28–290)
DHEA-S SERPL-MCNC: 6 UG/DL (ref 28–290)
DHEA-S SERPL-MCNC: 6 UG/DL (ref 28–290)
DHEA-S SERPL-MCNC: 7 UG/DL (ref 28–290)
EGFRCR SERPLBLD CKD-EPI 2021: 70 ML/MIN/1.73M*2
EGFRCR SERPLBLD CKD-EPI 2021: 79 ML/MIN/1.73M*2
EGFRCR SERPLBLD CKD-EPI 2021: 80 ML/MIN/1.73M*2
EGFRCR SERPLBLD CKD-EPI 2021: 85 ML/MIN/1.73M*2
EGFRCR SERPLBLD CKD-EPI 2021: 87 ML/MIN/1.73M*2
FSH SERPL-ACNC: 4.4 IU/L
FSH SERPL-ACNC: 4.7 IU/L
GLUCOSE BLDA-MCNC: 77 MG/DL (ref 74–99)
GLUCOSE SERPL-MCNC: 102 MG/DL (ref 74–99)
GLUCOSE SERPL-MCNC: 110 MG/DL (ref 74–99)
GLUCOSE SERPL-MCNC: 117 MG/DL (ref 74–99)
GLUCOSE SERPL-MCNC: 119 MG/DL (ref 74–99)
GLUCOSE SERPL-MCNC: 79 MG/DL (ref 74–99)
HCO3 BLDA-SCNC: 22.7 MMOL/L (ref 22–26)
HCT VFR BLD EST: 36 % (ref 41–52)
HGB BLDA-MCNC: 12 G/DL (ref 13.5–17.5)
HOLD SPECIMEN: NORMAL
INHALED O2 CONCENTRATION: 60 %
LACTATE BLDA-SCNC: 1.4 MMOL/L (ref 0.4–2)
LH SERPL-ACNC: 1.5 IU/L
LH SERPL-ACNC: 1.8 IU/L
OXYHGB MFR BLDA: 98 % (ref 94–98)
PCO2 BLDA: 43 MM HG (ref 38–42)
PH BLDA: 7.33 PH (ref 7.38–7.42)
PHOSPHATE SERPL-MCNC: 2.9 MG/DL (ref 2.5–4.9)
PHOSPHATE SERPL-MCNC: 3 MG/DL (ref 2.5–4.9)
PHOSPHATE SERPL-MCNC: 3 MG/DL (ref 2.5–4.9)
PHOSPHATE SERPL-MCNC: 3.1 MG/DL (ref 2.5–4.9)
PHOSPHATE SERPL-MCNC: 3.2 MG/DL (ref 2.5–4.9)
PO2 BLDA: 268 MM HG (ref 85–95)
POTASSIUM BLDA-SCNC: 3.8 MMOL/L (ref 3.5–5.3)
POTASSIUM SERPL-SCNC: 3.8 MMOL/L (ref 3.5–5.3)
POTASSIUM SERPL-SCNC: 3.9 MMOL/L (ref 3.5–5.3)
POTASSIUM SERPL-SCNC: 3.9 MMOL/L (ref 3.5–5.3)
POTASSIUM SERPL-SCNC: 4 MMOL/L (ref 3.5–5.3)
POTASSIUM SERPL-SCNC: 4 MMOL/L (ref 3.5–5.3)
POTASSIUM UR-SCNC: 68 MMOL/L
POTASSIUM UR-SCNC: 73 MMOL/L
POTASSIUM/CREAT UR-RTO: 54 MMOL/G CREAT
POTASSIUM/CREAT UR-RTO: 76 MMOL/G CREAT
PROLACTIN SERPL-MCNC: 11.5 UG/L (ref 2–18)
PROLACTIN SERPL-MCNC: 12.3 UG/L (ref 2–18)
PROLACTIN SERPL-MCNC: 13.4 UG/L (ref 2–18)
PROLACTIN SERPL-MCNC: 17.1 UG/L (ref 2–18)
RH FACTOR (ANTIGEN D): NORMAL
SAO2 % BLDA: 100 % (ref 94–100)
SODIUM BLDA-SCNC: 138 MMOL/L (ref 136–145)
SODIUM SERPL-SCNC: 139 MMOL/L (ref 136–145)
SODIUM SERPL-SCNC: 140 MMOL/L (ref 136–145)
SODIUM SERPL-SCNC: 141 MMOL/L (ref 136–145)
SODIUM UR-SCNC: 110 MMOL/L
SODIUM UR-SCNC: 81 MMOL/L
SODIUM/CREAT UR-RTO: 122 MMOL/G CREAT
SODIUM/CREAT UR-RTO: 60 MMOL/G CREAT
T4 FREE SERPL-MCNC: 0.9 NG/DL (ref 0.78–1.48)
T4 FREE SERPL-MCNC: 1.02 NG/DL (ref 0.78–1.48)
TESTOST SERPL-MCNC: <30 NG/DL (ref 240–1000)
TESTOST SERPL-MCNC: <30 NG/DL (ref 240–1000)
TSH SERPL-ACNC: 0.87 MIU/L (ref 0.44–3.98)
TSH SERPL-ACNC: 1.14 MIU/L (ref 0.44–3.98)

## 2024-12-09 PROCEDURE — 83001 ASSAY OF GONADOTROPIN (FSH): CPT

## 2024-12-09 PROCEDURE — 99100 ANES PT EXTEME AGE<1 YR&>70: CPT | Performed by: ANESTHESIOLOGY

## 2024-12-09 PROCEDURE — 09BL8ZZ EXCISION OF NASAL TURBINATE, VIA NATURAL OR ARTIFICIAL OPENING ENDOSCOPIC: ICD-10-PCS | Performed by: NEUROLOGICAL SURGERY

## 2024-12-09 PROCEDURE — 2500000005 HC RX 250 GENERAL PHARMACY W/O HCPCS: Performed by: NEUROLOGICAL SURGERY

## 2024-12-09 PROCEDURE — 7100000001 HC RECOVERY ROOM TIME - INITIAL BASE CHARGE: Performed by: NEUROLOGICAL SURGERY

## 2024-12-09 PROCEDURE — 2500000004 HC RX 250 GENERAL PHARMACY W/ HCPCS (ALT 636 FOR OP/ED): Performed by: ANESTHESIOLOGY

## 2024-12-09 PROCEDURE — 82435 ASSAY OF BLOOD CHLORIDE: CPT

## 2024-12-09 PROCEDURE — 82533 TOTAL CORTISOL: CPT

## 2024-12-09 PROCEDURE — 2500000002 HC RX 250 W HCPCS SELF ADMINISTERED DRUGS (ALT 637 FOR MEDICARE OP, ALT 636 FOR OP/ED): Performed by: STUDENT IN AN ORGANIZED HEALTH CARE EDUCATION/TRAINING PROGRAM

## 2024-12-09 PROCEDURE — 62165 REMOVE PITUIT TUMOR W/SCOPE: CPT | Performed by: OTOLARYNGOLOGY

## 2024-12-09 PROCEDURE — 2500000004 HC RX 250 GENERAL PHARMACY W/ HCPCS (ALT 636 FOR OP/ED): Performed by: OTOLARYNGOLOGY

## 2024-12-09 PROCEDURE — 84146 ASSAY OF PROLACTIN: CPT

## 2024-12-09 PROCEDURE — 62165 REMOVE PITUIT TUMOR W/SCOPE: CPT | Performed by: NEUROLOGICAL SURGERY

## 2024-12-09 PROCEDURE — 2780000003 HC OR 278 NO HCPCS: Performed by: NEUROLOGICAL SURGERY

## 2024-12-09 PROCEDURE — 3700000002 HC GENERAL ANESTHESIA TIME - EACH INCREMENTAL 1 MINUTE: Performed by: NEUROLOGICAL SURGERY

## 2024-12-09 PROCEDURE — 3600000017 HC OR TIME - EACH INCREMENTAL 1 MINUTE - PROCEDURE LEVEL SIX: Performed by: NEUROLOGICAL SURGERY

## 2024-12-09 PROCEDURE — 84403 ASSAY OF TOTAL TESTOSTERONE: CPT

## 2024-12-09 PROCEDURE — 09UX47Z SUPPLEMENT LEFT SPHENOID SINUS WITH AUTOLOGOUS TISSUE SUBSTITUTE, PERCUTANEOUS ENDOSCOPIC APPROACH: ICD-10-PCS | Performed by: NEUROLOGICAL SURGERY

## 2024-12-09 PROCEDURE — 99222 1ST HOSP IP/OBS MODERATE 55: CPT

## 2024-12-09 PROCEDURE — 82436 ASSAY OF URINE CHLORIDE: CPT

## 2024-12-09 PROCEDURE — 0761T DGTZ GLS MCRSCP SL IMM EA 1: CPT | Mod: TC,SUR | Performed by: NURSE PRACTITIONER

## 2024-12-09 PROCEDURE — 2500000001 HC RX 250 WO HCPCS SELF ADMINISTERED DRUGS (ALT 637 FOR MEDICARE OP)

## 2024-12-09 PROCEDURE — 2500000001 HC RX 250 WO HCPCS SELF ADMINISTERED DRUGS (ALT 637 FOR MEDICARE OP): Performed by: NEUROLOGICAL SURGERY

## 2024-12-09 PROCEDURE — 88313 SPECIAL STAINS GROUP 2: CPT | Performed by: PATHOLOGY

## 2024-12-09 PROCEDURE — 80400 ACTH STIMULATION PANEL: CPT

## 2024-12-09 PROCEDURE — 82626 DEHYDROEPIANDROSTERONE: CPT

## 2024-12-09 PROCEDURE — 82627 DEHYDROEPIANDROSTERONE: CPT

## 2024-12-09 PROCEDURE — 2720000007 HC OR 272 NO HCPCS: Performed by: NEUROLOGICAL SURGERY

## 2024-12-09 PROCEDURE — 7100000002 HC RECOVERY ROOM TIME - EACH INCREMENTAL 1 MINUTE: Performed by: NEUROLOGICAL SURGERY

## 2024-12-09 PROCEDURE — 61782 SCAN PROC CRANIAL EXTRA: CPT | Performed by: OTOLARYNGOLOGY

## 2024-12-09 PROCEDURE — 3600000018 HC OR TIME - INITIAL BASE CHARGE - PROCEDURE LEVEL SIX: Performed by: NEUROLOGICAL SURGERY

## 2024-12-09 PROCEDURE — 84305 ASSAY OF SOMATOMEDIN: CPT

## 2024-12-09 PROCEDURE — P9045 ALBUMIN (HUMAN), 5%, 250 ML: HCPCS | Mod: JZ

## 2024-12-09 PROCEDURE — A62165 PR NEUROENDOSCOP,EXC,PIT TUM,TRANSNAS/SPHEN: Performed by: NURSE ANESTHETIST, CERTIFIED REGISTERED

## 2024-12-09 PROCEDURE — 2500000005 HC RX 250 GENERAL PHARMACY W/O HCPCS

## 2024-12-09 PROCEDURE — 3700000001 HC GENERAL ANESTHESIA TIME - INITIAL BASE CHARGE: Performed by: NEUROLOGICAL SURGERY

## 2024-12-09 PROCEDURE — 2500000001 HC RX 250 WO HCPCS SELF ADMINISTERED DRUGS (ALT 637 FOR MEDICARE OP): Performed by: ANESTHESIOLOGY

## 2024-12-09 PROCEDURE — 88341 IMHCHEM/IMCYTCHM EA ADD ANTB: CPT | Performed by: PATHOLOGY

## 2024-12-09 PROCEDURE — 15769 GRFG AUTOL SOFT TISS DIR EXC: CPT | Performed by: OTOLARYNGOLOGY

## 2024-12-09 PROCEDURE — 0GB04ZZ EXCISION OF PITUITARY GLAND, PERCUTANEOUS ENDOSCOPIC APPROACH: ICD-10-PCS | Performed by: NEUROLOGICAL SURGERY

## 2024-12-09 PROCEDURE — 84439 ASSAY OF FREE THYROXINE: CPT

## 2024-12-09 PROCEDURE — 36620 INSERTION CATHETER ARTERY: CPT | Performed by: ANESTHESIOLOGY

## 2024-12-09 PROCEDURE — 84443 ASSAY THYROID STIM HORMONE: CPT

## 2024-12-09 PROCEDURE — 88342 IMHCHEM/IMCYTCHM 1ST ANTB: CPT | Performed by: PATHOLOGY

## 2024-12-09 PROCEDURE — 36415 COLL VENOUS BLD VENIPUNCTURE: CPT | Performed by: ANESTHESIOLOGY

## 2024-12-09 PROCEDURE — A62165 PR NEUROENDOSCOP,EXC,PIT TUM,TRANSNAS/SPHEN: Performed by: ANESTHESIOLOGY

## 2024-12-09 PROCEDURE — 36415 COLL VENOUS BLD VENIPUNCTURE: CPT

## 2024-12-09 PROCEDURE — 88305 TISSUE EXAM BY PATHOLOGIST: CPT | Performed by: PATHOLOGY

## 2024-12-09 PROCEDURE — 2500000004 HC RX 250 GENERAL PHARMACY W/ HCPCS (ALT 636 FOR OP/ED)

## 2024-12-09 PROCEDURE — 2060000001 HC INTERMEDIATE ICU ROOM DAILY

## 2024-12-09 PROCEDURE — 37799 UNLISTED PX VASCULAR SURGERY: CPT

## 2024-12-09 PROCEDURE — 82024 ASSAY OF ACTH: CPT

## 2024-12-09 RX ORDER — ESMOLOL HYDROCHLORIDE 10 MG/ML
INJECTION INTRAVENOUS AS NEEDED
Status: DISCONTINUED | OUTPATIENT
Start: 2024-12-09 | End: 2024-12-09

## 2024-12-09 RX ORDER — AMOXICILLIN 250 MG
2 CAPSULE ORAL 2 TIMES DAILY
Status: DISCONTINUED | OUTPATIENT
Start: 2024-12-09 | End: 2024-12-11 | Stop reason: HOSPADM

## 2024-12-09 RX ORDER — ACETAMINOPHEN 325 MG/1
TABLET ORAL AS NEEDED
Status: DISCONTINUED | OUTPATIENT
Start: 2024-12-09 | End: 2024-12-09

## 2024-12-09 RX ORDER — LIDOCAINE HCL/PF 100 MG/5ML
SYRINGE (ML) INTRAVENOUS AS NEEDED
Status: DISCONTINUED | OUTPATIENT
Start: 2024-12-09 | End: 2024-12-09

## 2024-12-09 RX ORDER — LABETALOL HYDROCHLORIDE 5 MG/ML
5 INJECTION, SOLUTION INTRAVENOUS EVERY 5 MIN PRN
Status: DISCONTINUED | OUTPATIENT
Start: 2024-12-09 | End: 2024-12-09 | Stop reason: HOSPADM

## 2024-12-09 RX ORDER — CEFAZOLIN 1 G/1
INJECTION, POWDER, FOR SOLUTION INTRAVENOUS AS NEEDED
Status: DISCONTINUED | OUTPATIENT
Start: 2024-12-09 | End: 2024-12-09

## 2024-12-09 RX ORDER — ACETAMINOPHEN 325 MG/1
650 TABLET ORAL EVERY 6 HOURS SCHEDULED
Status: DISCONTINUED | OUTPATIENT
Start: 2024-12-09 | End: 2024-12-11 | Stop reason: HOSPADM

## 2024-12-09 RX ORDER — PANTOPRAZOLE SODIUM 40 MG/1
40 TABLET, DELAYED RELEASE ORAL EVERY EVENING
Status: DISCONTINUED | OUTPATIENT
Start: 2024-12-10 | End: 2024-12-11 | Stop reason: HOSPADM

## 2024-12-09 RX ORDER — MEPERIDINE HYDROCHLORIDE 25 MG/ML
12.5 INJECTION INTRAMUSCULAR; INTRAVENOUS; SUBCUTANEOUS EVERY 10 MIN PRN
Status: DISCONTINUED | OUTPATIENT
Start: 2024-12-09 | End: 2024-12-09 | Stop reason: HOSPADM

## 2024-12-09 RX ORDER — HYDROMORPHONE HYDROCHLORIDE 0.2 MG/ML
0.1 INJECTION INTRAMUSCULAR; INTRAVENOUS; SUBCUTANEOUS EVERY 5 MIN PRN
Status: DISCONTINUED | OUTPATIENT
Start: 2024-12-09 | End: 2024-12-09 | Stop reason: HOSPADM

## 2024-12-09 RX ORDER — ONDANSETRON HYDROCHLORIDE 2 MG/ML
INJECTION, SOLUTION INTRAVENOUS AS NEEDED
Status: DISCONTINUED | OUTPATIENT
Start: 2024-12-09 | End: 2024-12-09

## 2024-12-09 RX ORDER — ROSUVASTATIN CALCIUM 20 MG/1
10 TABLET, COATED ORAL DAILY
Status: DISCONTINUED | OUTPATIENT
Start: 2024-12-09 | End: 2024-12-11 | Stop reason: HOSPADM

## 2024-12-09 RX ORDER — OXYCODONE HYDROCHLORIDE 10 MG/1
10 TABLET ORAL EVERY 4 HOURS PRN
Status: DISCONTINUED | OUTPATIENT
Start: 2024-12-09 | End: 2024-12-11 | Stop reason: HOSPADM

## 2024-12-09 RX ORDER — SODIUM CHLORIDE, SODIUM LACTATE, POTASSIUM CHLORIDE, CALCIUM CHLORIDE 600; 310; 30; 20 MG/100ML; MG/100ML; MG/100ML; MG/100ML
100 INJECTION, SOLUTION INTRAVENOUS CONTINUOUS
Status: DISCONTINUED | OUTPATIENT
Start: 2024-12-09 | End: 2024-12-09 | Stop reason: HOSPADM

## 2024-12-09 RX ORDER — PROPOFOL 10 MG/ML
INJECTION, EMULSION INTRAVENOUS AS NEEDED
Status: DISCONTINUED | OUTPATIENT
Start: 2024-12-09 | End: 2024-12-09

## 2024-12-09 RX ORDER — PHENYLEPHRINE HCL IN 0.9% NACL 0.4MG/10ML
SYRINGE (ML) INTRAVENOUS AS NEEDED
Status: DISCONTINUED | OUTPATIENT
Start: 2024-12-09 | End: 2024-12-09

## 2024-12-09 RX ORDER — HYDROMORPHONE HYDROCHLORIDE 0.2 MG/ML
0.2 INJECTION INTRAMUSCULAR; INTRAVENOUS; SUBCUTANEOUS
Status: DISCONTINUED | OUTPATIENT
Start: 2024-12-09 | End: 2024-12-10

## 2024-12-09 RX ORDER — CALCIUM CARBONATE 500(1250)
1250 TABLET ORAL DAILY
Status: DISCONTINUED | OUTPATIENT
Start: 2024-12-09 | End: 2024-12-11 | Stop reason: HOSPADM

## 2024-12-09 RX ORDER — OXYCODONE HYDROCHLORIDE 5 MG/1
10 TABLET ORAL EVERY 4 HOURS PRN
Status: DISCONTINUED | OUTPATIENT
Start: 2024-12-09 | End: 2024-12-09 | Stop reason: HOSPADM

## 2024-12-09 RX ORDER — MIDAZOLAM HYDROCHLORIDE 1 MG/ML
1 INJECTION INTRAMUSCULAR; INTRAVENOUS ONCE AS NEEDED
Status: DISCONTINUED | OUTPATIENT
Start: 2024-12-09 | End: 2024-12-09 | Stop reason: HOSPADM

## 2024-12-09 RX ORDER — ALBUMIN HUMAN 50 G/1000ML
SOLUTION INTRAVENOUS AS NEEDED
Status: DISCONTINUED | OUTPATIENT
Start: 2024-12-09 | End: 2024-12-09

## 2024-12-09 RX ORDER — OXYMETAZOLINE HCL 0.05 %
SPRAY, NON-AEROSOL (ML) NASAL AS NEEDED
Status: DISCONTINUED | OUTPATIENT
Start: 2024-12-09 | End: 2024-12-09 | Stop reason: HOSPADM

## 2024-12-09 RX ORDER — SODIUM CHLORIDE, SODIUM LACTATE, POTASSIUM CHLORIDE, CALCIUM CHLORIDE 600; 310; 30; 20 MG/100ML; MG/100ML; MG/100ML; MG/100ML
INJECTION, SOLUTION INTRAVENOUS CONTINUOUS PRN
Status: DISCONTINUED | OUTPATIENT
Start: 2024-12-09 | End: 2024-12-09

## 2024-12-09 RX ORDER — POLYETHYLENE GLYCOL 3350 17 G/17G
17 POWDER, FOR SOLUTION ORAL 2 TIMES DAILY
Status: DISCONTINUED | OUTPATIENT
Start: 2024-12-09 | End: 2024-12-11 | Stop reason: HOSPADM

## 2024-12-09 RX ORDER — HYDROMORPHONE HYDROCHLORIDE 0.2 MG/ML
0.2 INJECTION INTRAMUSCULAR; INTRAVENOUS; SUBCUTANEOUS EVERY 5 MIN PRN
Status: DISCONTINUED | OUTPATIENT
Start: 2024-12-09 | End: 2024-12-09 | Stop reason: HOSPADM

## 2024-12-09 RX ORDER — ONDANSETRON 4 MG/1
4 TABLET, FILM COATED ORAL EVERY 8 HOURS PRN
Status: DISCONTINUED | OUTPATIENT
Start: 2024-12-09 | End: 2024-12-11 | Stop reason: HOSPADM

## 2024-12-09 RX ORDER — LABETALOL HYDROCHLORIDE 5 MG/ML
10 INJECTION, SOLUTION INTRAVENOUS EVERY 10 MIN PRN
Status: DISCONTINUED | OUTPATIENT
Start: 2024-12-09 | End: 2024-12-11 | Stop reason: HOSPADM

## 2024-12-09 RX ORDER — HYDRALAZINE HYDROCHLORIDE 20 MG/ML
5 INJECTION INTRAMUSCULAR; INTRAVENOUS EVERY 30 MIN PRN
Status: DISCONTINUED | OUTPATIENT
Start: 2024-12-09 | End: 2024-12-09 | Stop reason: HOSPADM

## 2024-12-09 RX ORDER — NALOXONE HYDROCHLORIDE 0.4 MG/ML
0.2 INJECTION, SOLUTION INTRAMUSCULAR; INTRAVENOUS; SUBCUTANEOUS EVERY 5 MIN PRN
Status: DISCONTINUED | OUTPATIENT
Start: 2024-12-09 | End: 2024-12-11 | Stop reason: HOSPADM

## 2024-12-09 RX ORDER — ONDANSETRON HYDROCHLORIDE 2 MG/ML
4 INJECTION, SOLUTION INTRAVENOUS EVERY 8 HOURS PRN
Status: DISCONTINUED | OUTPATIENT
Start: 2024-12-09 | End: 2024-12-11 | Stop reason: HOSPADM

## 2024-12-09 RX ORDER — ALBUTEROL SULFATE 0.83 MG/ML
2.5 SOLUTION RESPIRATORY (INHALATION) ONCE AS NEEDED
Status: DISCONTINUED | OUTPATIENT
Start: 2024-12-09 | End: 2024-12-09 | Stop reason: HOSPADM

## 2024-12-09 RX ORDER — ACETAMINOPHEN 325 MG/1
975 TABLET ORAL EVERY 6 HOURS
Status: DISCONTINUED | OUTPATIENT
Start: 2024-12-09 | End: 2024-12-09 | Stop reason: HOSPADM

## 2024-12-09 RX ORDER — NORETHINDRONE AND ETHINYL ESTRADIOL 0.5-0.035
KIT ORAL AS NEEDED
Status: DISCONTINUED | OUTPATIENT
Start: 2024-12-09 | End: 2024-12-09

## 2024-12-09 RX ORDER — LIDOCAINE HYDROCHLORIDE AND EPINEPHRINE 10; 10 UG/ML; MG/ML
INJECTION, SOLUTION INFILTRATION; PERINEURAL AS NEEDED
Status: DISCONTINUED | OUTPATIENT
Start: 2024-12-09 | End: 2024-12-09 | Stop reason: HOSPADM

## 2024-12-09 RX ORDER — FENTANYL CITRATE 50 UG/ML
INJECTION, SOLUTION INTRAMUSCULAR; INTRAVENOUS AS NEEDED
Status: DISCONTINUED | OUTPATIENT
Start: 2024-12-09 | End: 2024-12-09

## 2024-12-09 RX ORDER — OXYCODONE HYDROCHLORIDE 5 MG/1
5 TABLET ORAL EVERY 4 HOURS PRN
Status: DISCONTINUED | OUTPATIENT
Start: 2024-12-09 | End: 2024-12-09 | Stop reason: HOSPADM

## 2024-12-09 RX ORDER — CHOLECALCIFEROL (VITAMIN D3) 25 MCG
1000 TABLET ORAL DAILY
Status: DISCONTINUED | OUTPATIENT
Start: 2024-12-09 | End: 2024-12-11 | Stop reason: HOSPADM

## 2024-12-09 RX ORDER — HEPARIN SODIUM 5000 [USP'U]/ML
5000 INJECTION, SOLUTION INTRAVENOUS; SUBCUTANEOUS EVERY 8 HOURS
Status: DISCONTINUED | OUTPATIENT
Start: 2024-12-11 | End: 2024-12-11 | Stop reason: HOSPADM

## 2024-12-09 RX ORDER — LIDOCAINE HYDROCHLORIDE 10 MG/ML
0.1 INJECTION, SOLUTION INFILTRATION; PERINEURAL ONCE
Status: DISCONTINUED | OUTPATIENT
Start: 2024-12-09 | End: 2024-12-09 | Stop reason: HOSPADM

## 2024-12-09 RX ORDER — HYDRALAZINE HYDROCHLORIDE 20 MG/ML
10 INJECTION INTRAMUSCULAR; INTRAVENOUS
Status: DISCONTINUED | OUTPATIENT
Start: 2024-12-09 | End: 2024-12-11 | Stop reason: HOSPADM

## 2024-12-09 RX ORDER — OXYCODONE HYDROCHLORIDE 5 MG/1
5 TABLET ORAL EVERY 4 HOURS PRN
Status: DISCONTINUED | OUTPATIENT
Start: 2024-12-09 | End: 2024-12-11 | Stop reason: HOSPADM

## 2024-12-09 RX ORDER — ROCURONIUM BROMIDE 10 MG/ML
INJECTION, SOLUTION INTRAVENOUS AS NEEDED
Status: DISCONTINUED | OUTPATIENT
Start: 2024-12-09 | End: 2024-12-09

## 2024-12-09 ASSESSMENT — PAIN - FUNCTIONAL ASSESSMENT
PAIN_FUNCTIONAL_ASSESSMENT: UNABLE TO SELF-REPORT
PAIN_FUNCTIONAL_ASSESSMENT: 0-10
PAIN_FUNCTIONAL_ASSESSMENT: 0-10
PAIN_FUNCTIONAL_ASSESSMENT: UNABLE TO SELF-REPORT

## 2024-12-09 ASSESSMENT — COGNITIVE AND FUNCTIONAL STATUS - GENERAL
HELP NEEDED FOR BATHING: A LITTLE
DAILY ACTIVITIY SCORE: 18
TURNING FROM BACK TO SIDE WHILE IN FLAT BAD: A LITTLE
EATING MEALS: A LITTLE
CLIMB 3 TO 5 STEPS WITH RAILING: A LITTLE
DRESSING REGULAR UPPER BODY CLOTHING: A LITTLE
CLIMB 3 TO 5 STEPS WITH RAILING: A LITTLE
PERSONAL GROOMING: A LITTLE
MOVING FROM LYING ON BACK TO SITTING ON SIDE OF FLAT BED WITH BEDRAILS: A LITTLE
STANDING UP FROM CHAIR USING ARMS: A LITTLE
MOBILITY SCORE: 19
WALKING IN HOSPITAL ROOM: A LITTLE
MOVING TO AND FROM BED TO CHAIR: A LITTLE
WALKING IN HOSPITAL ROOM: A LITTLE
MOVING TO AND FROM BED TO CHAIR: A LITTLE
MOBILITY SCORE: 18
TURNING FROM BACK TO SIDE WHILE IN FLAT BAD: A LITTLE
DRESSING REGULAR LOWER BODY CLOTHING: A LITTLE
STANDING UP FROM CHAIR USING ARMS: A LITTLE
TOILETING: A LITTLE

## 2024-12-09 ASSESSMENT — COLUMBIA-SUICIDE SEVERITY RATING SCALE - C-SSRS
1. IN THE PAST MONTH, HAVE YOU WISHED YOU WERE DEAD OR WISHED YOU COULD GO TO SLEEP AND NOT WAKE UP?: NO
2. HAVE YOU ACTUALLY HAD ANY THOUGHTS OF KILLING YOURSELF?: NO
6. HAVE YOU EVER DONE ANYTHING, STARTED TO DO ANYTHING, OR PREPARED TO DO ANYTHING TO END YOUR LIFE?: NO

## 2024-12-09 ASSESSMENT — PAIN SCALES - GENERAL
PAINLEVEL_OUTOF10: 7
PAINLEVEL_OUTOF10: 3
PAINLEVEL_OUTOF10: 4
PAINLEVEL_OUTOF10: 7
PAINLEVEL_OUTOF10: 5 - MODERATE PAIN
PAINLEVEL_OUTOF10: 5 - MODERATE PAIN
PAINLEVEL_OUTOF10: 7
PAINLEVEL_OUTOF10: 3
PAINLEVEL_OUTOF10: 4
PAINLEVEL_OUTOF10: 3
PAINLEVEL_OUTOF10: 4
PAINLEVEL_OUTOF10: 5 - MODERATE PAIN
PAINLEVEL_OUTOF10: 3
PAINLEVEL_OUTOF10: 0 - NO PAIN
PAINLEVEL_OUTOF10: 3
PAINLEVEL_OUTOF10: 7
PAINLEVEL_OUTOF10: 3
PAINLEVEL_OUTOF10: 0 - NO PAIN

## 2024-12-09 ASSESSMENT — PAIN DESCRIPTION - LOCATION: LOCATION: HEAD

## 2024-12-09 ASSESSMENT — PAIN SCALES - WONG BAKER: WONGBAKER_NUMERICALRESPONSE: HURTS LITTLE BIT

## 2024-12-09 NOTE — HOSPITAL COURSE
Dakota Pugh is a 84 y.o. male with a past medical history of HLD, peptic ulcer disease and osteoporosis who presented with bitemporal hemianopsia and found to have pituitary adenoma. Patient taken to the OR on 12/9 for EEA TSR and admitted to the neurosurgery service post operatively. Endocrinology followed patient during admission for assistance with hormonal management and interpretation of labs. At discharge recommended repeat of pituitary labs in 1 week. PT/OT evaluated patient and recommended no further needs at discharge. On the day of discharge, the pt was tolerating a diet, pain was controlled on PO pain medication, and they were ambulating and voiding spontaneously. They were discharged in stable condition with detailed instructions and scheduled outpatient follow up.

## 2024-12-09 NOTE — OP NOTE
Excision Transsphenoidal Endoscopy Pituitary with Navigation Operative Note     Date: 2024  OR Location: [facility] OR    Name: Dakota Pugh, : 1940, Age: 84 y.o., MRN: [id number], Sex: male    Diagnosis  Pre-op Diagnosis      * Pituitary adenoma (Multi) [D35.2] Post-op Diagnosis     * Pituitary adenoma (Multi) [D35.2]     Procedures  1.  Endoscopic endonasal transsphenoidal resection of pituitary region neoplasm  2.  Free mucosal graft reconstruction  3.  Extracranial CT image guidance    Surgeons   Panel 1:     * Ernesto Bermudez - Primary  Panel 2:     * Ramon Orona - Primary    Resident/Fellow/Other Assistant:  Surgeons and Role:  Panel 1:     * Jimmy Roth MD - Resident - Assisting     * Derick De Anda DPM - Resident - Assisting    Staff:   Circulator: Jude Christiansen Person: Tyra Christiansen Person: Shawna    Anesthesia Staff: Anesthesiologist: Trevor Dinh MD PhD  CRNA: EDIE Harden  SRNA: Derick Grace  Frontline Breaker: EDIE Ruvalcaba    Procedure Summary  Anesthesia: General  ASA: III  Estimated Blood Loss: 250mL  Intra-op Medications:   Administrations occurring from 0650 to 1630 on 24:   Medication Name Total Dose   oxymetazoline (Afrin) 0.05 % nasal spray 60 mL   lidocaine-epinephrine (Xylocaine W/EPI) 1 %-1:100,000 injection 3 mL   gelatin absorbable (Gelfoam) 100 sponge 1 each   thrombin-bovine (JMI) 5,000 unit topical solution 10,000 Units   acetaminophen (Tylenol) tablet 975 mg 975 mg   HYDROmorphone (Dilaudid) injection 0.5 mg 1 mg   HYDROmorphone PF (Dilaudid) injection 0.2 mg 0.2 mg   promethazine (Phenergan) 6.25 mg in sodium chloride 0.9% 50 mL IV 6.25 mg   acetaminophen (Tylenol) tablet 975 mg   albumin human 5 % 500 mL   ceFAZolin (Ancef) vial 1 g 2 g   ePHEDrine injection 15 mg   esmolol (Brevibloc) injection 10 mg   fentaNYL (Sublimaze) injection 50 mcg/mL 100 mcg   LR bolus Cannot be calculated   lactated Ringer's infusion  Cannot be calculated   lidocaine (cardiac) injection 2% prefilled syringe 100 mg   ondansetron (Zofran) 2 mg/mL injection 4 mg   phenylephrine 40 mcg/mL syringe 10 mL 280 mcg   propofol (Diprivan) injection 10 mg/mL 200 mg   rocuronium (ZeMuron) 50 mg/5 mL injection 110 mg   sugammadex (Bridion) 200 mg/2 mL injection 200 mg              Anesthesia Record               Intraprocedure I/O Totals          Intake    LR bolus 500.00 mL    lactated Ringer's 900.00 mL    Total Intake 1400 mL       Output    Urine 110 mL    Est. Blood Loss 250 mL    Total Output 360 mL       Net    Net Volume 1040 mL          Specimen:   ID Type Source Tests Collected by Time   1 : PITUITARY TUMOR Tissue PITUITARY SURGICAL PATHOLOGY EXAM Ernesto Bermudez MD 12/9/2024 1006      Drains and/or Catheters:   Urethral Catheter Non-latex 16 Fr. (Active)   Site Assessment Clean;Skin intact 12/09/24 1145   Collection Container Standard drainage bag 12/09/24 1145   Securement Method Securing device (Describe) 12/09/24 1145     Findings:   1.  No CSF leak following resection  2.  Reconstruction performed with free graft overlay, Surgicel, Floseal, NasoPore    Indications: Dakota Pugh is an 84 y.o. male who is having surgery for a pituitary region neoplasm causing optic nerve compression.  Risks and benefits were reviewed and he was comfortable moving forward with endonasal surgery.    Procedure Details:   After informed consent was obtained and all questions were answered the patient was brought to the operating room and placed supine on the operating room table.  General anesthesia was induced by the anesthesia staff and the patient was orally intubated.  The table was turned 90 degrees and Afrin-soaked pledgets were placed within both the patient's nasal cavities.     The CT image guidance system was brought into the field.  The CT data was uploaded reviewed and the plan was finalized.  The headset was attached to the patient.  The image  guidance was registered accurate in 3 separate orientations and was used throughout the surgical procedure to identify critical landmarks such as the borders of the orbit as well as the ethmoid skull base.  The patient was then prepped and draped in a standard fashion for endonasal surgery.     Free mucosal graft harvest was performed.  Each middle turbinate was identified and resected and each posterior blood supply was cauterized.  The mucosa was harvested from the left middle turbinate and was later used for coverage of the right anterior septum.     Endoscopic endonasal transsphenoidal resection of pituitary region neoplasm was performed.  Bilaterally, anterior and posterior ethmoid air cells were removed adjacent to the lamina and along the ethmoid skull base.  The superior turbinate was identified bilaterally and the inferior one third was resected.  The natural drainage pathway of each sphenoid was identified and cannulated and each sphenoidotomy was opened widely in all orientations.  A posterior septectomy was created.  The intersphenoid septation was resected and the sella turcica, tuberculum, and planum were all visualized.  In conjunction with neurological surgery the bone over the anterior aspect of the sella turcica was traversed and the dura was widely exposed.  The dura was traversed and the tumor was encountered.  Initially, it was difficult to get a clear plane of dissection but after traversing some septations we were able to enter softer components of the tumor.  We began inferiorly, then laterally, then superiorly.  The tumor was consistent with adenoma.  The diaphragma had relaxed into the surgical defect allowing for decompression of the optic nerves.  Additional tumor was then removed superiorly and laterally bilaterally.  At this point in time, there was no obvious residual tumor and the resection component of the procedure was concluded.  No CSF leak was encountered following resection.      Free mucosal graft placement was performed.  The previously harvested free graft was placed over the dural defect with coverage in all orientations.  Surgicel was placed at the periphery followed by Floseal and then NasoPore.  Hemostasis was deemed excellent.  An orogastric tube was passed to evacuate the contents of his stomach.  He was turned over to the anesthesia staff and extubated in the operating room without complication.     This was a joint procedure between otolaryngology and neurosurgery.  Please see the neurosurgery dictation for details of their components.  He will be admitted to the neurological surgery service for further care.    Complications:  None; patient tolerated the procedure well.    Disposition: PACU - hemodynamically stable.  Condition: stable     Ramon Orona MD

## 2024-12-09 NOTE — ANESTHESIA PREPROCEDURE EVALUATION
Patient: Dakota Pugh    Procedure Information       Date/Time: 12/09/24 0650    Procedures:       Excision Transsphenoidal Endoscopy Pituitary with Navigation      Endoscopic endonasal transsphenoidal resection of pituitary region neoplasm - 99723-43      Creation Fasciocutaneous Flap Head/Neck - 07486-61      Excision Adipose Tissue Graft Head/Neck      Navigation-Assisted Surgery    Location: [facility] OR 25 / [facility] OR    Surgeons: Ernesto Bermudez MD; Ramon Orona MD            Relevant Problems   Cardiac   (+) Chest pain   (+) EKG, abnormal   (+) Essential familial hypercholesterolemia   (+) Hyperlipidemia      Neuro   (+) Carpal tunnel syndrome, bilateral upper limbs   (+) Lumbar neuritis   (+) Recurrent major depressive disorder, in partial remission (CMS-HCC)      GI   (+) Peptic ulcer disease with hemorrhage      /Renal   (+) Acute cystitis without hematuria   (+) Hyponatremia      Liver   (+) Liver cyst      Hematology   (+) Anemia      Musculoskeletal   (+) Carpal tunnel syndrome, bilateral upper limbs      Skin   (+) Basal cell carcinoma of skin of other parts of face       Clinical information reviewed:   Tobacco  Allergies  Meds   Med Hx  Surg Hx   Fam Hx  Soc Hx        NPO Detail:  NPO/Void Status  Date of Last Liquid: 12/08/24  Time of Last Liquid: 1900  Date of Last Solid: 12/09/24  Time of Last Solid: 1900  Last Intake Type: Clear fluids         Physical Exam    Airway  Mallampati: II  TM distance: >3 FB     Cardiovascular - normal exam     Dental    Pulmonary - normal exam     Abdominal            Anesthesia Plan    History of general anesthesia?: yes  History of complications of general anesthesia?: no    ASA 3     general     Use of blood products discussed with patient who.    Plan discussed with resident and attending.        Attending Anesthesiologist Note  Above information reviewed including relevant HPI, PMHx, PSHx, anesthesia history, labs, and  imaging.    Summary (from patient interview and chart review):   83 y/o male scheduled for Endoscopic endonasal transsphenoidal resection of pituitary region neoplasm     PMHX includes HLD, Orthostatic Hypotension, Peptic ulcer disease, osteoporosis and Hx of bladder cancer.       Stress test 12/2017:  Summary:   1. No clinical, electrocardiographic or echocardiographic evidence for ischemia at a maximal workload.   2. Normal Stress Test.   3. The adequate level of stress was achieved.    Relevant Problems   Cardiac   (+) Chest pain   (+) EKG, abnormal   (+) Essential familial hypercholesterolemia   (+) Hyperlipidemia      Neuro   (+) Carpal tunnel syndrome, bilateral upper limbs   (+) Lumbar neuritis   (+) Recurrent major depressive disorder, in partial remission (CMS-HCC)      GI   (+) Peptic ulcer disease with hemorrhage      /Renal   (+) Acute cystitis without hematuria   (+) Hyponatremia      Liver   (+) Liver cyst      Hematology   (+) Anemia      Musculoskeletal   (+) Carpal tunnel syndrome, bilateral upper limbs      Skin   (+) Basal cell carcinoma of skin of other parts of face        Medication Documentation Review Audit       Reviewed by Laura Singh RN (Registered Nurse) on 12/09/24 at 0642      Medication Order Taking? Sig Documenting Provider Last Dose Status   acetaminophen (Tylenol Extra Strength) 500 mg tablet [id number] Yes Take 1 tablet (500 mg) by mouth every 8 hours if needed for mild pain (1 - 3). Historical Provider, MD Past Week Active   amoxicillin (Amoxil) 500 mg capsule [id number] No TAKE 4 CAPSULES BY MOUTH 1 HOUR BEFORE DENTAL APPOINMENT   Patient not taking: Reported on 12/9/2024    Historical Provider, MD Not Taking Active   calcium carbonate (Oscal) 500 mg calcium (1,250 mg) tablet [id number] Yes Take 1 tablet (1,250 mg) by mouth once daily. Historical Provider, MD Past Week Active   calcium carbonate-vitamin D3 600 mg-5 mcg (200 unit) tablet [id number] Yes Take 1 tablet by  mouth twice a day. Historical Provider, MD Past Week Active   chlorhexidine (Hibiclens) 4 % external liquid [id number] Yes Apply topically once daily as needed for wound care for up to 5 days. Lama Fanny MD 12/9/2024 Morning Active   chlorhexidine (Peridex) 0.12 % solution [id number] Yes Use 15 mL in the mouth or throat if needed for wound care for up to 14 days. Lama Fanny MD 12/9/2024 Morning Active   cholecalciferol (Vitamin D-3) 25 MCG (1000 UT) tablet [id number] Yes Take 1 tablet (1,000 Units) by mouth once daily. Historical Provider, MD Past Week Active   denosumab (Prolia) 60 mg/mL syringe [id number] No Inject 1 mL (60 mg total) under the skin every 6 months.   Patient not taking: Reported on 12/9/2024    Cristi Verma, DO More than a month Active   famotidine (Pepcid) 20 mg tablet [id number] Yes Take 1 tablet (20 mg) by mouth 2 times a day.   Patient taking differently: Take 1 tablet (20 mg) by mouth once daily.    Gian Brody MD 12/9/2024 Morning Active   fish oil concentrate (Omega-3) 120-180 mg capsule [id number] Yes Take 1 capsule (1 g) by mouth once daily. Historical Provider, MD Past Week Active   pantoprazole (ProtoNix) 40 mg EC tablet [id number] Yes Take 1 tablet (40 mg) by mouth once daily in the evening. Rah Tanner MD 12/9/2024 Morning Active   rosuvastatin (Crestor) 10 mg tablet [id number] Yes Take 1 tablet (10 mg) by mouth once daily. Gian Brody MD 12/8/2024 Active   vit A/vit C/vit E/zinc/copper (PRESERVISION AREDS ORAL) [id number] Yes Take 1 tablet by mouth 2 times a day. Historical Provider, MD Past Week Active                    Visit Vitals  /58   Pulse 62   Temp 36 °C (96.8 °F) (Temporal)   Resp 20   SpO2 99%   Smoking Status Never            9/27/2024     4:30 PM 10/1/2024     3:57 PM 10/3/2024    12:58 PM 10/15/2024    11:51 AM 11/4/2024     1:51 PM 12/5/2024     1:11 PM 12/9/2024     6:30 AM   Vitals   Systolic   105 108 99 96 121   Diastolic   68 53 62 55 58   Heart Rate   "82 50 57 62 59 62   Temp   35.7 °C (96.3 °F)   36.1 °C (96.9 °F) 36 °C (96.8 °F)   Resp       20   Height  1.753 m (5' 9\") 1.803 m (5' 11\") 1.803 m (5' 11\")  1.778 m (5' 10\")    Weight (lb) 182 179 179 184 180 181.8    BMI 25.91 kg/m2 26.43 kg/m2 24.97 kg/m2 25.66 kg/m2 25.1 kg/m2 26.09 kg/m2    BSA (m2) 2.02 m2 1.99 m2 2.02 m2 2.05 m2 2.02 m2 2.02 m2    Visit Report  Report Report Report Report          Recent Labs:    Chemistry    Lab Results   Component Value Date/Time     12/05/2024 1355    K 4.6 12/05/2024 1355     12/05/2024 1355    CO2 27 12/05/2024 1355    BUN 18 12/05/2024 1355    CREATININE 0.93 12/05/2024 1355    Lab Results   Component Value Date/Time    CALCIUM 8.8 12/05/2024 1355    ALKPHOS 106 07/02/2024 0937    AST 38 07/02/2024 0937    ALT 30 07/02/2024 0937    BILITOT 0.7 07/02/2024 0937          Lab Results   Component Value Date/Time    WBC 5.9 12/05/2024 1355    HGB 13.8 12/05/2024 1355    HCT 41.8 12/05/2024 1355     12/05/2024 1355     Lab Results   Component Value Date/Time    PROTIME 17.4 (H) 04/05/2023 0424    INR 1.5 (H) 04/05/2023 0424       Electrocardiogram:  No results found. However, due to the size of the patient record, not all encounters were searched. Please check Results Review for a complete set of results.    Echocardiogram:  Results for orders placed in visit on 11/04/22    Echocardiogram    Narrative  [facility], [address] [place] [address]  Tel [phone] and Fax [phone]    TRANSTHORACIC ECHOCARDIOGRAM REPORT      Patient Name:     SONIA Trinh Physician:   [id number] Mega Castro DO  Study Date:       11/4/2022       Referring Physician: JIMBO MCMAHON  MRN/PID:          [id number]        PCP:                 [facility] Jimbo Mcmahon MD  Accession/Order#: [id number]    Department Location: [facility] Non Invasive    CONCLUSIONS:  1. Left ventricular systolic function is normal with a 55-60% estimated ejection fraction.  2. " RVSP within normal limits.  3. Mild aortic valve regurgitation.  4. There is plaque visualized in the ascending aorta.    Anesthesia History: no complications  Anesthesia Plan: GA/ETT, std monitors, a-line    I discussed the anesthesia plan with the patient and/or family and reviewed the risks, benefits and alternatives. Agree to proceed.  Trevor Dinh MD PhD

## 2024-12-09 NOTE — OP NOTE
Excision Transsphenoidal Endoscopy Pituitary with Navigation Operative Note     Date: 2024  OR Location: [facility] OR    Name: Dakota Pugh, : 1940, Age: 84 y.o., MRN: [id number], Sex: male    Diagnosis  Pre-op Diagnosis      * Pituitary adenoma (Multi) [D35.2] Post-op Diagnosis     * Pituitary adenoma (Multi) [D35.2]     Procedures  Excision Transsphenoidal Endoscopy Pituitary with Navigation  46855 - [place] NUNDSC ICRA EXC PITUITRY LEÓN TRNSNSL/SPHENOID    Endoscopic endonasal transsphenoidal resection of pituitary region neoplasm  69076 - [place] NUNDSC ICRA EXC PITUITRY LEÓN TRNSNSL/SPHENOID    Navigation-Assisted Surgery  59061 - [place] STRTCTC CPTR ASSTD PX EXTRADURAL CRANIAL      Surgeons   Panel 1:     * Ernesto Bermudez - Primary  Panel 2:     * Ramon Orona - Primary    Resident/Fellow/Other Assistant:  Surgeons and Role:  Panel 1:     * Jimmy Roth MD - Resident - Assisting     * Derick De Anda DPM - Resident - Assisting    Staff:   Circulator: Jude Christiansen Person: Tyra Christiansen Person: Shawna    Anesthesia Staff: Anesthesiologist: Trevor Dinh MD PhD  CRNA: EDIE Harden  SRNA: Derick Grace  Frontline Breaker: EDIE Ruvalcaba    Procedure Summary  Anesthesia: General  ASA: III  Estimated Blood Loss: mL  Intra-op Medications:   Administrations occurring from 0650 to 1630 on 24:   Medication Name Total Dose   oxymetazoline (Afrin) 0.05 % nasal spray 60 mL   lidocaine-epinephrine (Xylocaine W/EPI) 1 %-1:100,000 injection 3 mL   gelatin absorbable (Gelfoam) 100 sponge 1 each   thrombin-bovine (JMI) 5,000 unit topical solution 10,000 Units   acetaminophen (Tylenol) tablet 975 mg 975 mg   HYDROmorphone (Dilaudid) injection 0.5 mg 1 mg   acetaminophen (Tylenol) tablet 975 mg   albumin human 5 % 500 mL   ceFAZolin (Ancef) vial 1 g 2 g   ePHEDrine injection 15 mg   esmolol (Brevibloc) injection 10 mg   fentaNYL (Sublimaze) injection 50 mcg/mL 100 mcg   LR  bolus Cannot be calculated   lactated Ringer's infusion Cannot be calculated   lidocaine (cardiac) injection 2% prefilled syringe 100 mg   ondansetron (Zofran) 2 mg/mL injection 4 mg   phenylephrine 40 mcg/mL syringe 10 mL 280 mcg   propofol (Diprivan) injection 10 mg/mL 200 mg   rocuronium (ZeMuron) 50 mg/5 mL injection 110 mg   sugammadex (Bridion) 200 mg/2 mL injection 200 mg              Anesthesia Record               Intraprocedure I/O Totals          Intake    LR bolus 500.00 mL    lactated Ringer's 900.00 mL    Total Intake 1400 mL       Output    Urine 110 mL    Est. Blood Loss 250 mL    Total Output 360 mL       Net    Net Volume 1040 mL          Specimen:   ID Type Source Tests Collected by Time   1 : PITUITARY TUMOR Tissue PITUITARY SURGICAL PATHOLOGY EXAM Ernesto Bermudez MD 12/9/2024 1006                 Drains and/or Catheters:   Urethral Catheter Non-latex 16 Fr. (Active)   Site Assessment Clean;Skin intact 12/09/24 1145   Collection Container Standard drainage bag 12/09/24 1145   Securement Method Securing device (Describe) 12/09/24 1145       Tourniquet Times:         Implants:     Findings:     Indications: Dakota Pugh is an 84 y.o. male who is having surgery for Pituitary adenoma (Multi) [D35.2].     The patient was seen in the preoperative area. The risks, benefits, complications, treatment options, non-operative alternatives, expected recovery and outcomes were discussed with the patient. The possibilities of reaction to medication, pulmonary aspiration, injury to surrounding structures, bleeding, recurrent infection, the need for additional procedures, failure to diagnose a condition, and creating a complication requiring transfusion or operation were discussed with the patient. The patient concurred with the proposed plan, giving informed consent.  The site of surgery was properly noted/marked if necessary per policy. The patient has been actively warmed in preoperative area.  Preoperative antibiotics  Venous thrombosis prophylaxis     Procedure Details: Patient was placed supine extradural neuronavigation was utilized by Dr. Orona for endoscopic endonasal approach to the pituitary fossa where a macroadenoma was encountered compressing the optic chiasm the dura was opened widely curettes and entered and extradural neuronavigation was utilized along with suction aspiration bimanual technique with endoscopic approach to debulk the tumor and the optic apparatus without complication specimens were sent for pathologic analysis once this was done the wound was turned over back over to Dr. Orona for closure no complications  Complications:      Disposition:   Condition:                  Additional Details:     Attending Attestation:     Ernesto Bermudez  Phone Number: [phone]

## 2024-12-09 NOTE — CONSULTS
Inpatient consult to Endocrinology  Consult performed by: Mt Aguilera DO  Consult ordered by: Ernesto Bermudez MD  Reason for consult: Pituitary macroadenoma s/p resection  Assessment/Recommendations: Pituitary macroadenoma s/p resection      Reason For Consult  s/p EEA PSR of pituitary macroadenoma on MRI of the sella & suprasella with mass effect upon optic apparatus     History Of Present Illness  Dakota Pugh is a 84 y.o. male presenting with HLD, peptic ulcer disease, osteoporosis, closed fracture of L femur, who was evaluated by ophthalmology due to history of cataracts.  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  Patient  was then referred to endocrinology for evaluation of pituitary function pre-op and subsequently undergone EEA PSR of pituitary macroadenoma on 12/9/24 at 0730 AM.    Past Medical History  He has a past medical history of Arthritis, Bladder cancer (Multi), Cataract, Chronic bronchitis (Multi), Dyslipidemia, GERD (gastroesophageal reflux disease), Glaucoma, History of blood transfusion, Hyperlipidemia, Iron deficiency, Low BP, Osteoporosis, Other conditions influencing health status, Pelvic mass, Personal history of malignant neoplasm of bladder (09/13/2018), and Stomach ulcer.    Surgical History  He has a past surgical history that includes Tonsillectomy (12/11/2013); Total hip arthroplasty (Bilateral, 12/11/2013); Bladder surgery (04/28/2015); Total shoulder arthroplasty (Bilateral); Femur fracture surgery (Bilateral); and Colonoscopy.     Social History  He reports that he has never smoked. He has been exposed to tobacco smoke. He has never used smokeless tobacco. He reports current alcohol use of about 3.0 standard drinks of alcohol per week. He reports that he does not currently use drugs.    Family History  Family History   Problem Relation  Name Age of Onset    Breast cancer Mother      Glaucoma Father      Asthma Daughter Jesika     GORAN disease Son Naun     Diabetes Son Naun     Sleep apnea Son Naun     Other (HTN) Son Naun         Allergies  Patient has no known allergies.    Review of Systems   All other systems reviewed and are negative.       Physical Exam  Vitals and nursing note reviewed.          ROS, PMH, FH/SH, surgical history and allergies have been reviewed.    Last Recorded Vitals  Blood pressure 130/61, pulse 55, temperature 36.4 °C (97.5 °F), resp. rate 14, SpO2 95%.    Relevant Results  Results from last 7 days   Lab Units 12/09/24  1115 12/09/24  0752 12/05/24  1355   GLUCOSE mg/dL 102* 79 87        If you would like to pull in Medications, type .meds     If you would like to pull in Lab results for the last 24 hours, type .[id number]    If you would like to pull in specific Lab results, type .ll     If you would like to pull in Imaging results, type .imslt :99}       Assessment/Plan   Dakota Pugh is a 84 y.o. male presenting with HLD, peptic ulcer disease, osteoporosis, closed fracture of L femur, who was evaluated by ophthalmology due to history of cataracts.  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  Patient  was then referred to endocrinology for evaluation of pituitary function pre-op and subsequently undergone EEA PSR of pituitary macroadenoma on 12/9/24 at 0730 AM.    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     #Pituitary Macroadenoma s/p resection on 12/9/24:  #R/O Centra DI, Centra AI:  - Tumor seen on MRI of the sella and suprasella with mass effect upon optic apparatus  - Patient goes to outpatient endocrinology office  - S/p EEA PSR on 12/9/24 at 0730  - No pre- or intraoperative steroids given  - Cortisol levels 19.3 at 1115, 12.5 0752 (post-resection); 13.9  two months ago    Recommendations:  - Please, collect ACTH, Cortisol, DHEAs, DHEA, Prolactin, and RFP 6 hours post-surgery ~2PM  - Please, collect RFP 12 hours postsurgery ~6PM  - Please, collect ACTH, Cortisol, DHEAs, DHEA, Prolactin, TSH, FT4, and RFP 24 hours post-surgery ~0700 on 12/10/24  - Continue monitoring accurate urine output: for urine output >300 ml/hour for 2 consecutive hrs (or 600 ml after 2 hours). Please, draw STAT RFP, Urine lytes, Urine osm, Serum osm, and page Endocrine at 06975.   - Endocrine will continue to follow up and adjust insulin requirement accordingly    Plan was discussed and finalized with the attending endocrinologist, Dr. Thomas.  Primary team was notified of the plan.    Mt Aguilera, PGY-3  Internal Medicine   Endocrinology

## 2024-12-09 NOTE — BRIEF OP NOTE
Date: 2024  OR Location: [facility] OR    Name: Dakota Pugh, : 1940, Age: 84 y.o., MRN: [id number], Sex: male    Diagnosis  Pre-op Diagnosis      * Pituitary adenoma (Multi) [D35.2] Post-op Diagnosis     * Pituitary adenoma (Multi) [D35.2]     Procedures  Excision Transsphenoidal Endoscopy Pituitary with Navigation  40907 - [place] NUNDSC ICRA EXC PITUITRY LEÓN TRNSNSL/SPHENOID    Endoscopic endonasal transsphenoidal resection of pituitary region neoplasm  75995 - [place] NUNDSC ICRA EXC PITUITRY LEÓN TRNSNSL/SPHENOID    Navigation-Assisted Surgery  03691 - [place] STRTCTC CPTR ASSTD PX EXTRADURAL CRANIAL      Surgeons   Panel 1:     * Ernesto Bermudez - Primary  Panel 2:     * Ramon Orona - Primary    Resident/Fellow/Other Assistant:  Surgeons and Role:  Panel 1:     * Jimmy Roth MD - Resident - Assisting     * Derick De Anda DPM - Resident - Assisting    Staff:   Circulator: Jude Christiansen Person: Tyra Christiansen Person: Shawna    Anesthesia Staff: Anesthesiologist: Trevor Dinh MD PhD  CRNA: JOSÉ ANTONIO Harden-CRNA  SRNA: Derick Grace  Frontline Breaker: EDIE Ruvalcaba    Procedure Summary  Anesthesia: General  ASA: III  Estimated Blood Loss: 250 mL  Intra-op Medications:   Administrations occurring from 0650 to 1630 on 24:   Medication Name Total Dose   oxymetazoline (Afrin) 0.05 % nasal spray 60 mL   lidocaine-epinephrine (Xylocaine W/EPI) 1 %-1:100,000 injection 3 mL   gelatin absorbable (Gelfoam) 100 sponge 1 each   thrombin-bovine (JMI) 5,000 unit topical solution 10,000 Units   HYDROmorphone (Dilaudid) injection 0.5 mg 1 mg   acetaminophen (Tylenol) tablet 975 mg   albumin human 5 % 500 mL   ceFAZolin (Ancef) vial 1 g 2 g   ePHEDrine injection 15 mg   esmolol (Brevibloc) injection 10 mg   fentaNYL (Sublimaze) injection 50 mcg/mL 100 mcg   LR bolus Cannot be calculated   lactated Ringer's infusion Cannot be calculated   lidocaine (cardiac) injection 2% prefilled  syringe 100 mg   ondansetron (Zofran) 2 mg/mL injection 4 mg   phenylephrine 40 mcg/mL syringe 10 mL 280 mcg   propofol (Diprivan) injection 10 mg/mL 200 mg   rocuronium (ZeMuron) 50 mg/5 mL injection 110 mg   sugammadex (Bridion) 200 mg/2 mL injection 200 mg              Anesthesia Record               Intraprocedure I/O Totals          Intake    LR bolus 500.00 mL    lactated Ringer's 900.00 mL    Total Intake 1400 mL       Output    Urine 110 mL    Est. Blood Loss 250 mL    Total Output 360 mL       Net    Net Volume 1040 mL          Specimen:   ID Type Source Tests Collected by Time   1 : PITUITARY TUMOR Tissue PITUITARY SURGICAL PATHOLOGY EXAM Ernesto Bermudez MD 12/9/2024 1003        Findings: descent of the diaphragma, septated soft mass    Complications:  None; patient tolerated the procedure well.     Disposition: PACU - hemodynamically stable.  Condition: stable  Specimens Collected:   ID Type Source Tests Collected by Time   1 : PITUITARY TUMOR Tissue PITUITARY SURGICAL PATHOLOGY EXAM Ernesto Bermudez MD 12/9/2024 1006     Attending Attestation:     Ernesto Bermudez  Phone Number: [phone]

## 2024-12-09 NOTE — CARE PLAN
The patient's goals for the shift include  adequate urine output     The clinical goals for the shift include  adequate urine output      Problem: Pain  Goal: Turns in bed with improved pain control throughout the shift  Outcome: Progressing     Problem: Pain  Goal: Walks with improved pain control throughout the shift  Outcome: Progressing     Problem: Pain  Goal: Performs ADL's with improved pain control throughout shift  Outcome: Progressing     Problem: Pain  Goal: Free from opioid side effects throughout the shift  Outcome: Progressing   .

## 2024-12-10 LAB
ACTH PLAS-MCNC: 40.2 PG/ML (ref 7.2–63.3)
ACTH PLAS-MCNC: 50.4 PG/ML (ref 7.2–63.3)
ACTH PLAS-MCNC: 7.7 PG/ML (ref 7.2–63.3)
ALBUMIN SERPL BCP-MCNC: 3.5 G/DL (ref 3.4–5)
ALBUMIN SERPL BCP-MCNC: 3.8 G/DL (ref 3.4–5)
ANION GAP SERPL CALC-SCNC: 10 MMOL/L (ref 10–20)
ANION GAP SERPL CALC-SCNC: 12 MMOL/L (ref 10–20)
BUN SERPL-MCNC: 15 MG/DL (ref 6–23)
BUN SERPL-MCNC: 16 MG/DL (ref 6–23)
CALCIUM SERPL-MCNC: 8 MG/DL (ref 8.6–10.6)
CALCIUM SERPL-MCNC: 8 MG/DL (ref 8.6–10.6)
CHLORIDE SERPL-SCNC: 105 MMOL/L (ref 98–107)
CHLORIDE SERPL-SCNC: 107 MMOL/L (ref 98–107)
CHLORIDE UR-SCNC: 111 MMOL/L
CHLORIDE/CREATININE (MMOL/G) IN URINE: 193 MMOL/G CREAT (ref 23–275)
CO2 SERPL-SCNC: 23 MMOL/L (ref 21–32)
CO2 SERPL-SCNC: 27 MMOL/L (ref 21–32)
CORTIS SERPL-MCNC: 16.3 UG/DL (ref 2.5–20)
CORTIS SERPL-MCNC: 31.6 UG/DL (ref 2.5–20)
CORTIS SERPL-MCNC: 6.1 UG/DL (ref 2.5–20)
CREAT SERPL-MCNC: 0.84 MG/DL (ref 0.5–1.3)
CREAT SERPL-MCNC: 0.91 MG/DL (ref 0.5–1.3)
CREAT UR-MCNC: 57.5 MG/DL (ref 20–370)
DHEA-S SERPL-MCNC: 5 UG/DL (ref 28–290)
DHEA-S SERPL-MCNC: 5 UG/DL (ref 28–290)
EGFRCR SERPLBLD CKD-EPI 2021: 83 ML/MIN/1.73M*2
EGFRCR SERPLBLD CKD-EPI 2021: 86 ML/MIN/1.73M*2
ESTRADIOL SERPL-MCNC: <19 PG/ML
FSH SERPL-ACNC: 4.2 IU/L
GLUCOSE SERPL-MCNC: 106 MG/DL (ref 74–99)
GLUCOSE SERPL-MCNC: 135 MG/DL (ref 74–99)
LH SERPL-ACNC: 1.7 IU/L
PHOSPHATE SERPL-MCNC: 2.6 MG/DL (ref 2.5–4.9)
PHOSPHATE SERPL-MCNC: 2.9 MG/DL (ref 2.5–4.9)
POTASSIUM SERPL-SCNC: 3.7 MMOL/L (ref 3.5–5.3)
POTASSIUM SERPL-SCNC: 4.6 MMOL/L (ref 3.5–5.3)
POTASSIUM UR-SCNC: 72 MMOL/L
POTASSIUM/CREAT UR-RTO: 125 MMOL/G CREAT
PROLACTIN SERPL-MCNC: 12 UG/L (ref 2–18)
PROLACTIN SERPL-MCNC: 12.6 UG/L (ref 2–18)
SODIUM SERPL-SCNC: 137 MMOL/L (ref 136–145)
SODIUM SERPL-SCNC: 138 MMOL/L (ref 136–145)
SODIUM UR-SCNC: 113 MMOL/L
SODIUM/CREAT UR-RTO: 197 MMOL/G CREAT
T4 FREE SERPL-MCNC: 0.88 NG/DL (ref 0.78–1.48)
T4 FREE SERPL-MCNC: 0.9 NG/DL (ref 0.78–1.48)
TSH SERPL-ACNC: 0.39 MIU/L (ref 0.44–3.98)
TSH SERPL-ACNC: 0.44 MIU/L (ref 0.44–3.98)

## 2024-12-10 PROCEDURE — 2500000001 HC RX 250 WO HCPCS SELF ADMINISTERED DRUGS (ALT 637 FOR MEDICARE OP)

## 2024-12-10 PROCEDURE — 2500000001 HC RX 250 WO HCPCS SELF ADMINISTERED DRUGS (ALT 637 FOR MEDICARE OP): Performed by: PHYSICIAN ASSISTANT

## 2024-12-10 PROCEDURE — 84402 ASSAY OF FREE TESTOSTERONE: CPT

## 2024-12-10 PROCEDURE — 82533 TOTAL CORTISOL: CPT

## 2024-12-10 PROCEDURE — 2500000002 HC RX 250 W HCPCS SELF ADMINISTERED DRUGS (ALT 637 FOR MEDICARE OP, ALT 636 FOR OP/ED): Performed by: STUDENT IN AN ORGANIZED HEALTH CARE EDUCATION/TRAINING PROGRAM

## 2024-12-10 PROCEDURE — 36415 COLL VENOUS BLD VENIPUNCTURE: CPT

## 2024-12-10 PROCEDURE — 82436 ASSAY OF URINE CHLORIDE: CPT

## 2024-12-10 PROCEDURE — 82670 ASSAY OF TOTAL ESTRADIOL: CPT

## 2024-12-10 PROCEDURE — 99233 SBSQ HOSP IP/OBS HIGH 50: CPT | Performed by: PHYSICIAN ASSISTANT

## 2024-12-10 PROCEDURE — 84100 ASSAY OF PHOSPHORUS: CPT

## 2024-12-10 PROCEDURE — 2060000001 HC INTERMEDIATE ICU ROOM DAILY

## 2024-12-10 PROCEDURE — 84305 ASSAY OF SOMATOMEDIN: CPT

## 2024-12-10 PROCEDURE — 97161 PT EVAL LOW COMPLEX 20 MIN: CPT | Mod: GP

## 2024-12-10 PROCEDURE — 97165 OT EVAL LOW COMPLEX 30 MIN: CPT | Mod: GO

## 2024-12-10 PROCEDURE — 82024 ASSAY OF ACTH: CPT

## 2024-12-10 PROCEDURE — 84443 ASSAY THYROID STIM HORMONE: CPT

## 2024-12-10 PROCEDURE — 82627 DEHYDROEPIANDROSTERONE: CPT

## 2024-12-10 PROCEDURE — 84146 ASSAY OF PROLACTIN: CPT

## 2024-12-10 PROCEDURE — 82626 DEHYDROEPIANDROSTERONE: CPT

## 2024-12-10 PROCEDURE — 2500000001 HC RX 250 WO HCPCS SELF ADMINISTERED DRUGS (ALT 637 FOR MEDICARE OP): Performed by: STUDENT IN AN ORGANIZED HEALTH CARE EDUCATION/TRAINING PROGRAM

## 2024-12-10 PROCEDURE — 80069 RENAL FUNCTION PANEL: CPT

## 2024-12-10 PROCEDURE — 80400 ACTH STIMULATION PANEL: CPT

## 2024-12-10 PROCEDURE — 83001 ASSAY OF GONADOTROPIN (FSH): CPT

## 2024-12-10 PROCEDURE — 84439 ASSAY OF FREE THYROXINE: CPT

## 2024-12-10 RX ORDER — FAMOTIDINE 20 MG/1
20 TABLET, FILM COATED ORAL 2 TIMES DAILY
Status: DISCONTINUED | OUTPATIENT
Start: 2024-12-10 | End: 2024-12-11 | Stop reason: HOSPADM

## 2024-12-10 ASSESSMENT — COGNITIVE AND FUNCTIONAL STATUS - GENERAL
MOVING TO AND FROM BED TO CHAIR: A LITTLE
MOVING TO AND FROM BED TO CHAIR: A LITTLE
CLIMB 3 TO 5 STEPS WITH RAILING: A LITTLE
CLIMB 3 TO 5 STEPS WITH RAILING: A LOT
HELP NEEDED FOR BATHING: A LITTLE
TURNING FROM BACK TO SIDE WHILE IN FLAT BAD: A LITTLE
MOVING FROM LYING ON BACK TO SITTING ON SIDE OF FLAT BED WITH BEDRAILS: A LITTLE
DRESSING REGULAR UPPER BODY CLOTHING: A LITTLE
TOILETING: A LITTLE
STANDING UP FROM CHAIR USING ARMS: A LITTLE
DRESSING REGULAR LOWER BODY CLOTHING: A LITTLE
TURNING FROM BACK TO SIDE WHILE IN FLAT BAD: A LITTLE
WALKING IN HOSPITAL ROOM: A LITTLE
MOBILITY SCORE: 19
DAILY ACTIVITIY SCORE: 20
MOBILITY SCORE: 17
WALKING IN HOSPITAL ROOM: A LITTLE
STANDING UP FROM CHAIR USING ARMS: A LITTLE

## 2024-12-10 ASSESSMENT — PAIN - FUNCTIONAL ASSESSMENT
PAIN_FUNCTIONAL_ASSESSMENT: 0-10

## 2024-12-10 ASSESSMENT — PAIN SCALES - GENERAL
PAINLEVEL_OUTOF10: 0 - NO PAIN

## 2024-12-10 ASSESSMENT — ACTIVITIES OF DAILY LIVING (ADL)
ADL_ASSISTANCE: INDEPENDENT
BATHING_ASSISTANCE: STAND BY
LACK_OF_TRANSPORTATION: NO
ADL_ASSISTANCE: INDEPENDENT

## 2024-12-10 NOTE — PROGRESS NOTES
Department of Neurosurgery  Daily Progress Note    Patient Name: Dakota Pugh  MRN: [id number]  Date:  12/10/24     Subjective  Patient resting comfortably in bed. Denies any acute concerns. Vision stable with blurry peripheral vision of left eye. Pain well managed. Pending rios removal this AM. Denies any fever, chills, night sweats, CP, SOB, palpitations, nausea, vomiting, or abdominal pain/discomfort.     Objective    Vital Signs  /58 (BP Location: Left arm, Patient Position: Lying)   Pulse 54   Temp 36.4 °C (97.5 °F) (Temporal)   Resp 11   SpO2 97%      Physical Exam  Constitutional: A&Ox3, calm and cooperative, NAD.  Eyes: Bitemporal hemianopsia. Left eye 20/40. Right eye 20/20.   ENMT: Moist mucous membranes. No epistaxis or rhinorrhea.   Head/Neck: Neck supple, no JVD. NC/AT.   Cardiovascular: Normal rate and regular rhythm. 2+ equal pulses of the distal extremities.  Respiratory/Thorax: CTAB, regular respirations on RA. Good symmetric chest expansion.   Gastrointestinal: Abdomen soft, non tender.   Genitourinary: Rios in place with minimal pale urine to collecting bag.   Extremities: No peripheral edema. Moving all 4 extremities actively with 5/5 strength.   Neurological: A&Ox3.   Psychological: Appropriate mood and behavior.   Skin: Warm and dry.     Diagnostics   Results for orders placed or performed during the hospital encounter of 12/09/24 (from the past 24 hours)   Urine electrolytes   Result Value Ref Range    Sodium, Urine Random 110 mmol/L    Sodium/Creatinine Ratio 122 Not established. mmol/g Creat    Potassium, Urine Random 68 mmol/L    Potassium/Creatinine Ratio 76 Not established mmol/g Creat    Chloride, Urine Random 109 mmol/L    Chloride/Creatinine Ratio 121 23 - 275 mmol/g creat    Creatinine, Urine Random 89.8 20.0 - 370.0 mg/dL   Renal function panel   Result Value Ref Range    Glucose 102 (H) 74 - 99 mg/dL    Sodium 139 136 - 145 mmol/L    Potassium 4.0 3.5 - 5.3 mmol/L     Chloride 109 (H) 98 - 107 mmol/L    Bicarbonate 24 21 - 32 mmol/L    Anion Gap 10 10 - 20 mmol/L    Urea Nitrogen 23 6 - 23 mg/dL    Creatinine 0.94 0.50 - 1.30 mg/dL    eGFR 80 >60 mL/min/1.73m*2    Calcium 7.8 (L) 8.6 - 10.6 mg/dL    Phosphorus 3.0 2.5 - 4.9 mg/dL    Albumin 3.3 (L) 3.4 - 5.0 g/dL   T4, free   Result Value Ref Range    Thyroxine, Free 1.02 0.78 - 1.48 ng/dL   DHEA-Sulfate   Result Value Ref Range    DHEA Sulfate 6 (L) 28 - 290 ug/dL   Thyroid Stimulating Hormone   Result Value Ref Range    Thyroid Stimulating Hormone 0.87 0.44 - 3.98 mIU/L   FSH & LH   Result Value Ref Range    Follicle Stimulating Hormone 4.4 IU/L    Luteinizing Hormone 1.5 IU/L   Prolactin   Result Value Ref Range    Prolactin 13.4 2.0 - 18.0 ug/L   Cortisol   Result Value Ref Range    Cortisol 19.3 2.5 - 20.0 ug/dL   Testosterone   Result Value Ref Range    Testosterone <30 (L) 240 - 1,000 ng/dL   Cortisol   Result Value Ref Range    Cortisol 27.1 (H) 2.5 - 20.0 ug/dL   DHEA-Sulfate   Result Value Ref Range    DHEA Sulfate 6 (L) 28 - 290 ug/dL   Prolactin   Result Value Ref Range    Prolactin 11.5 2.0 - 18.0 ug/L   Renal Function Panel   Result Value Ref Range    Glucose 110 (H) 74 - 99 mg/dL    Sodium 140 136 - 145 mmol/L    Potassium 4.0 3.5 - 5.3 mmol/L    Chloride 108 (H) 98 - 107 mmol/L    Bicarbonate 25 21 - 32 mmol/L    Anion Gap 11 10 - 20 mmol/L    Urea Nitrogen 21 6 - 23 mg/dL    Creatinine 0.87 0.50 - 1.30 mg/dL    eGFR 85 >60 mL/min/1.73m*2    Calcium 8.0 (L) 8.6 - 10.6 mg/dL    Phosphorus 3.1 2.5 - 4.9 mg/dL    Albumin 3.6 3.4 - 5.0 g/dL   Renal Function Panel   Result Value Ref Range    Glucose 117 (H) 74 - 99 mg/dL    Sodium 139 136 - 145 mmol/L    Potassium 3.9 3.5 - 5.3 mmol/L    Chloride 108 (H) 98 - 107 mmol/L    Bicarbonate 24 21 - 32 mmol/L    Anion Gap 11 10 - 20 mmol/L    Urea Nitrogen 20 6 - 23 mg/dL    Creatinine 0.81 0.50 - 1.30 mg/dL    eGFR 87 >60 mL/min/1.73m*2    Calcium 8.1 (L) 8.6 - 10.6 mg/dL     Phosphorus 2.9 2.5 - 4.9 mg/dL    Albumin 3.7 3.4 - 5.0 g/dL   Cortisol   Result Value Ref Range    Cortisol 34.6 (H) 2.5 - 20.0 ug/dL   DHEA-Sulfate   Result Value Ref Range    DHEA Sulfate 7 (L) 28 - 290 ug/dL   Prolactin   Result Value Ref Range    Prolactin 12.3 2.0 - 18.0 ug/L   Renal Function Panel   Result Value Ref Range    Glucose 119 (H) 74 - 99 mg/dL    Sodium 139 136 - 145 mmol/L    Potassium 3.9 3.5 - 5.3 mmol/L    Chloride 105 98 - 107 mmol/L    Bicarbonate 26 21 - 32 mmol/L    Anion Gap 12 10 - 20 mmol/L    Urea Nitrogen 19 6 - 23 mg/dL    Creatinine 0.95 0.50 - 1.30 mg/dL    eGFR 79 >60 mL/min/1.73m*2    Calcium 8.3 (L) 8.6 - 10.6 mg/dL    Phosphorus 3.0 2.5 - 4.9 mg/dL    Albumin 3.9 3.4 - 5.0 g/dL   Electrolyte Panel, Urine   Result Value Ref Range    Sodium, Urine Random 113 mmol/L    Sodium/Creatinine Ratio 197 Not established. mmol/g Creat    Potassium, Urine Random 72 mmol/L    Potassium/Creatinine Ratio 125 Not established mmol/g Creat    Chloride, Urine Random 111 mmol/L    Chloride/Creatinine Ratio 193 23 - 275 mmol/g creat    Creatinine, Urine Random 57.5 20.0 - 370.0 mg/dL   Cortisol   Result Value Ref Range    Cortisol 31.6 (H) 2.5 - 20.0 ug/dL   Renal Function Panel   Result Value Ref Range    Glucose 106 (H) 74 - 99 mg/dL    Sodium 137 136 - 145 mmol/L    Potassium 4.6 3.5 - 5.3 mmol/L    Chloride 107 98 - 107 mmol/L    Bicarbonate 23 21 - 32 mmol/L    Anion Gap 12 10 - 20 mmol/L    Urea Nitrogen 16 6 - 23 mg/dL    Creatinine 0.84 0.50 - 1.30 mg/dL    eGFR 86 >60 mL/min/1.73m*2    Calcium 8.0 (L) 8.6 - 10.6 mg/dL    Phosphorus 2.9 2.5 - 4.9 mg/dL    Albumin 3.8 3.4 - 5.0 g/dL     *Note: Due to a large number of results and/or encounters for the requested time period, some results have not been displayed. A complete set of results can be found in Results Review.     Current Medications  Scheduled medications  acetaminophen, 650 mg, oral, q6h [place]  calcium carbonate, 1,250 mg, oral,  Daily  cholecalciferol, 1,000 Units, oral, Daily  [START ON 12/11/2024] heparin (porcine), 5,000 Units, subcutaneous, q8h  pantoprazole, 40 mg, oral, q PM  polyethylene glycol, 17 g, oral, BID  rosuvastatin, 10 mg, oral, Daily  sennosides-docusate sodium, 2 tablet, oral, BID  sodium chloride, 2 spray, Each Nostril, 5x daily    Continuous medications     PRN medications  PRN medications: hydrALAZINE, HYDROmorphone, labetaloL, naloxone, ondansetron **OR** ondansetron, oxyCODONE, oxyCODONE     Assessment/Plan  Dakota Pugh is a 84 y.o. male with a past medical history of HLD, peptic ulcer disease and osteoporosis who presented with bitemporal hemianopsia and found to have pituitary adenoma. Patient taken to the OR on 12/9 for EEA TSR and admitted to the neurosurgery service post operatively.     # S/p EEA for TSR of pituitary adenoma  - ENT following, appreicate recs        ·  Nasal saline 5x a day    - Endocrinology consulted, appreciate recs       ·  Please, collect ACTH, Cortisol, DHEAs, DHEA, Prolactin, and RFP 6 hours post-surgery ~2PM (complete)        ·  Please, collect RFP 12 hours postsurgery ~6PM (complete)        ·  Please, collect ACTH, Cortisol, DHEAs, DHEA, Prolactin, TSH, FT4, and RFP 24 hours post-surgery ~0700 on 12/10/24 (complete)        ·  Continue monitoring accurate urine output: for urine output >300 ml/hour for 2 consecutive hrs (or 600 ml after 2 hours) please, draw STAT RFP, Urine lytes, Urine osm, Serum osm, [place]ocrine [place] [phone]   - Encourage ambulation, pending PT/OT assessment   - Beatty removal POD#1, pending trial of void   - Strict I&Os at least every 2 hours   - Monitor VS/pulse ox [id number]   - Monitor AM CBC/RFP    # Acute postoperative pain  - Well controlled per patient, continue current regimen       ·  Scheduled Tylenol 650mg PO [id number]        ·  Tylenol 650mg PO [id number] PRN mild pain        ·  Oxycodone 5/10mg PO [id number] PRN mod/severe pain   - Bowel regimen while using  narcotics  - IV Zofran PRN nausea due to narcotics   - Pain assessments T1rmase     # Hx of peptic ulcer disease   - Continue home protonix 40mg daily and pepcid 20mg BID     # Hx HLD   - Continue home Crestor 10mg daily     # Hx osteoporosis   - Continue home calcium carbonate 1250mg daily and cholecalciferol 1000units daily     Prophylaxis:   - DVT: SQ heparin [id number], SCDs, encourage ambulation   - Encourage IS x10 every hour while awake     FEN/GI:  - Monitor/replete electrolytes PRN   - Continue regular diet   - GI protection with Protonix 40mg daily   - Bowel regimen: Scheduled Miralax and pericolace daily        Disposition: Pending PT/OT assessment and endocrinology recommendations/lab work. Follow up appointment with ENT scheduled 12/18 and 12/23, endocrinology 2/4 and ophthalmology 10/6, 3 month neurosurgery follow up requested.     Patient and plan discussed with Dr. Aidan Moreno PA-C   Neurologic Surgery   [facility]  Team Pager #[phone]     Total face to face time spent with patient/family of > 60 minutes, with >50% of the time spent discussing plan of care/management, counseling/educating on disease processes, explaining results of diagnostic testing.

## 2024-12-10 NOTE — PROGRESS NOTES
12/10/24 1433   Discharge Planning   Living Arrangements Spouse/significant other   Support Systems Spouse/significant other;Children   Assistance Needed None   Type of Residence Private residence   Number of Stairs to Enter Residence 0   Number of Stairs Within Residence 0   Home or Post Acute Services None   Expected Discharge Disposition Home   Does the patient need discharge transport arranged? No  (family will provide transport)   Financial Resource Strain   How hard is it for you to pay for the very basics like food, housing, medical care, and heating? Not hard   Housing Stability   In the last 12 months, was there a time when you were not able to pay the mortgage or rent on time? N   At any time in the past 12 months, were you homeless or living in a shelter (including now)? N   Transportation Needs   In the past 12 months, has lack of transportation kept you from medical appointments or from getting medications? no   In the past 12 months, has lack of transportation kept you from meetings, work, or from getting things needed for daily living? No     Met with pt and introduced myself as Care Coordinator with Care Transitions Team for Discharge Planning. Pt stated he/she feels safe at home. Pt [place] to [place]. Pt denies need for assistance with Sikh or cultural practices. Pt's address, was verified. Pt denies any social or financial concerns at this time.  DME: has a walker, cane     Transitional Care Coordination Progress Note:  Plan per Medical/Surgical team: Pt s/p OFELIA TSR for pituitary adenoma. Awaiting endocrine rec's, to remove blanca PT/OT.  Discharge Disposition: home with wife.   Potential Barriers: none  ADOD:12/11/24

## 2024-12-10 NOTE — PROGRESS NOTES
ENT DAILY PROGRESS NOTE  Name: Dakota Pugh  MRN: [id number]  : 1940    Identification Statement  The patient is a 84 y.o. male who underwent transsphenoidal resection of pituitary lesion on  with Dr. Bermudez and Dr. Orona.    Subjective  No acute events overnight  No concerns reported by nursing  Pain controlled   No epistaxis or CSF concern    Objective  Temp:  [36.2 °C (97.2 °F)-36.4 °C (97.5 °F)] 36.2 °C (97.2 °F)  Heart Rate:  [53-61] 54  Resp:  [10-16] 12  BP: ()/(50-76) 117/50  Arterial Line BP 1: (147-176)/(55-69) 159/62  Gen: Alert, oriented, no acute distress  Resp: Breathing comfortably on room air, no stridor  Head: Atraumatic, normocephalic  Oral Cavity: MMM  Ears: Normal external ears   Nose: External nose midline, rhinoscopy with packing, hemostatic        Intake/Output Summary (Last 24 hours) at 12/10/2024 0658  Last data filed at 12/10/2024 0600  Gross per 24 hour   Intake 2075.25 ml   Output 1650 ml   Net 425.25 ml       Labs    Assessment  The patient is a 84 y.o. male who underwent transsphenoidal resection of pituitary lesion on  with Dr. Bermudez and Dr. Orona.    NSGY Primary  ENT ordered nasal saline, cleared by NSGY      Gilbert Cedeno, PGY3  I am the day resident. I can only be contacted from 5 AM to 6 PM. Page on weekends or off hours.   Otolaryngology - Head & Neck Surgery  ENT Consult pager: [phone]  Peds pager: [phone]  Adult Head & Neck Phone: [phone]  ENT Subspecialty (otology, rhinology, laryngology, plastics, sleep):   M-F 6am-5pm- EpicChat or page the resident who wrote the daily note   Nights & weekends- [phone]  ENT Outpatient [phone]   Please page if urgent

## 2024-12-10 NOTE — PROGRESS NOTES
Physical Therapy    Physical Therapy Evaluation    Patient Name: Dakota Pugh  MRN: [id number]  Department: [facility] [id number]  Room: [address] [place] [address]  Today's Date: 12/10/2024   Time Calculation  Start Time: 1039  Stop Time: 1059  Time Calculation (min): 20 min    Assessment/Plan   PT Assessment  PT Assessment Results: Decreased endurance, Impaired balance, Decreased mobility  Rehab Prognosis: Excellent  Barriers to Discharge: medical POC  End of Session Communication: Bedside nurse  Assessment Comment: 83yo male presents s/p surgery for rsxn of pituitary tumor.  Pt demo dec endurance & balance limiting functional mobility.  Pt would benefit from continued therapy to address these deficits and improve safety and indep  End of Session Patient Position: Bed, 3 rail up, Alarm on  IP OR SWING BED PT PLAN  Inpatient or Swing Bed: Inpatient  PT Plan  Treatment/Interventions: Bed mobility, Transfer training, Gait training, Balance training, Strengthening, Endurance training, Therapeutic exercise, Therapeutic activity  PT Plan: Ongoing PT  PT Frequency: 3 times per week  PT Discharge Recommendations: No PT needed after discharge  Equipment Recommended upon Discharge:  (TBD)  PT Recommended Transfer Status: Contact guard  PT - OK to Discharge: Yes (PT eval completed & recs made)    Subjective   General Visit Information:  General  Reason for Referral: transsphenoidal resection of pituitary lesion on 12/9  Past Medical History Relevant to Rehab: HLD, peptic ulcer disease and osteoporosis who presented with bitemporal hemianopsia and found to have pituitary adenoma  Family/Caregiver Present: Yes  Caregiver Feedback: supportive wife & dtr at bedside  Co-Treatment: OT  Co-Treatment Reason: Pt seen with OT to maximize pt safety & facilitate timely discharge planning  Prior to Session Communication: Bedside nurse  Patient Position Received: Bed, 3 rail up, Alarm on  Preferred Learning Style: verbal, auditory  General Comment: Supine in bed  upon arrival; pleasant & cooperative, wlling to participate in session  Home Living:  Home Living  Type of Home: Condo  Lives With: Spouse  Home Adaptive Equipment: Walker rolling or standard  Home Layout: Able to live on main level with bedroom/bathroom  Home Access: Ramped entrance  Bathroom Shower/Tub: Walk-in shower  Bathroom Equipment: Shower chair with back, Raised toilet seat with rails  Home Living Comments: Pt's dtr, then son, will stay with pt after discharge for 1 week each  Prior Level of Function:  Prior Function Per Pt/Caregiver Report  Level of [place]: Independent with ADLs and functional transfers  ADL Assistance: Independent  Homemaking Assistance: Independent  Ambulatory Assistance: Independent (with WW)  Prior Function Comments: denies recent falls; sees a  weekly  Precautions:  Precautions  Hearing/Visual Limitations: per EMR, pt with blurry L peripheral vision  Medical Precautions: Fall precautions  Post-Surgical Precautions:  (sinus precautions)        Objective   Pain:  Pain Assessment  Pain Assessment: 0-10  0-10 (Numeric) Pain Score: 0 - No pain  Cognition:  Cognition  Overall Cognitive Status: Within Functional Limits  Orientation Level: Oriented X4    General Assessments:     Activity Tolerance  Endurance: Tolerates 10 - 20 min exercise with multiple rests    Sensation  Light Touch: No apparent deficits    Strength  Strength Comments: grossly WFL for functional mobility  Strength  Strength Comments: grossly WFL for functional mobility    Perception  Inattention/Neglect: Appears intact  Initiation: Appears intact  Motor Planning: Appears intact  Perseveration: Not present    Coordination  Movements are Fluid and Coordinated: Yes    Postural Control  Postural Control: Within Functional Limits    Static Sitting Balance  Static Sitting-Balance Support: Feet supported  Static Sitting-Level of Assistance: Close supervision    Static Standing Balance  Static Standing-Balance Support:  Bilateral upper extremity supported  Static Standing-Level of Assistance: Contact guard  Dynamic Standing Balance  Dynamic Standing-Balance Support: Bilateral upper extremity supported  Dynamic Standing-Level of Assistance: Contact guard  Dynamic Standing-Balance: Turning  Functional Assessments:  Bed Mobility  Bed Mobility: Yes  Bed Mobility 1  Bed Mobility 1: Supine to sitting, Sitting to supine  Level of Assistance 1: Contact guard, Minimal verbal cues  Bed Mobility Comments 1: cues for sequencing; HOB elevated    Transfers  Transfer: Yes  Transfer 1  Technique 1: Sit to stand, Stand to sit  Transfer Device 1: Walker  Transfer Level of Assistance 1: Contact guard, Minimal verbal cues  Trials/Comments 1: cues for safe hand placement- demo good carryover    Ambulation/Gait Training  Ambulation/Gait Training Performed: Yes  Ambulation/Gait Training 1  Surface 1: Level tile  Device 1: Rolling walker  Assistance 1: Contact guard  Quality of Gait 1: Inconsistent stride length, Forward flexed posture  Comments/Distance (ft) 1: 60' (cueing for upright posture)    Stairs  Stairs: No  Extremity/Trunk Assessments:  RUE   RUE :  (shoulder to ~75 degrees elevation, distally WFL)  LUE   LUE:  (shoulder to ~75 degrees, distally WFL)  RLE   RLE : Within Functional Limits  LLE   LLE : Within Functional Limits  Outcome Measures:  [facility] Basic Mobility  Turning from your back to your side while in a flat bed without using bedrails: A little  Moving from lying on your back to sitting on the side of a flat bed without using bedrails: A little  Moving to and from bed to chair (including a wheelchair): A little  Standing up from a chair using your arms (e.g. wheelchair or bedside chair): A little  To walk in hospital room: A little  Climbing 3-5 steps with railing: A lot  Basic Mobility - Total Score: 17    Encounter Problems       Encounter Problems (Active)       Mobility       STG - Patient will ambulate >100' with WW indep without  LOB  (Progressing)       Start:  12/10/24    Expected End:  12/24/24               PT Transfers       STG - Patient to transfer to and from sit to supine indep without rails  (Progressing)       Start:  12/10/24    Expected End:  12/24/24            STG - Patient will transfer sit to and from stand indep with WW (Progressing)       Start:  12/10/24    Expected End:  12/24/24               Pain - Adult              Education Documentation  Precautions, taught by Ora Thurman PT at 12/10/2024  3:33 PM.  Learner: Family, Patient  Readiness: Acceptance  Method: Explanation  Response: Verbalizes Understanding  Comment: PT poc, safety with mobility, inc amb with assist    Body Mechanics, taught by Ora Thurman PT at 12/10/2024  3:33 PM.  Learner: Family, Patient  Readiness: Acceptance  Method: Explanation  Response: Verbalizes Understanding  Comment: PT poc, safety with mobility, inc amb with assist    Mobility Training, taught by Ora Thurman PT at 12/10/2024  3:33 PM.  Learner: Family, Patient  Readiness: Acceptance  Method: Explanation  Response: Verbalizes Understanding  Comment: PT poc, safety with mobility, inc amb with assist    Education Comments  No comments found.      12/10/24 at 3:35 PM - Ora Thurman PT

## 2024-12-10 NOTE — PROGRESS NOTES
Occupational Therapy    Evaluation    Patient Name: Dakota Pugh  MRN: [id number]  Today's Date: 12/10/2024  Time Calculation  Start Time: 1039  Stop Time: 1100  Time Calculation (min): 21 min    Assessment  IP OT Assessment  OT Assessment: Pt required CGA with functional bed mobility and CGA with functional transfers, able to paulo pants and underwear with min assist while sitting at the side of the bed and completed the activity in standing.  Prognosis: Good  Barriers to Discharge: None  Evaluation/Treatment Tolerance: Patient tolerated treatment well  Medical Staff Made Aware: Yes  End of Session Communication: Bedside nurse  End of Session Patient Position: Bed, 3 rail up, Alarm on  Plan:  Treatment Interventions: ADL retraining, Functional transfer training, Endurance training  OT Frequency: 2 times per week  OT Discharge Recommendations: No further acute OT  OT Recommended Transfer Status:  (CGA)  OT - OK to Discharge: Yes    Subjective   Current Problem:  1. Pituitary abnormality (Multi)  Adrenocorticotropic Hormone (ACTH)    Cortisol    Prolactin    DHEA-Sulfate    Renal Function Panel      2. Pituitary adenoma (Multi)  Surgical Pathology Exam    Surgical Pathology Exam        General:  Reason for Referral: S/p EEA for TSR of pituitary adenoma  Past Medical History Relevant to Rehab: presented with bitemporal hemianopsia and found to have pituitary adenoma, HLD, peptic ulcer disease and osteoporosis  Prior to Session Communication: Bedside nurse  Patient Position Received: Bed, 3 rail up, Alarm on  Family/Caregiver Present: Yes  Caregiver Feedback: pt's wife and daughter present. Pt's wife reported she had Shingles and is using a wheelchair currently however, once she gets better she will be able to assist the pt as needed.  General Comment: Supine with HOB elevated at the start of the session.   Precautions:  Medical Precautions: Fall precautions    Pain:  Denied pain      Objective   Cognition:  Overall  Cognitive Status: Within Functional Limits  Orientation Level: Oriented X4  Problem Solving: Within Functional Limits  Insight: Within function limits           Home Living:  Type of Home: Condo  Lives With: Spouse (daughter from [place] will be staying for 1 week then their son will stay with them for a week.)  Home Layout: Able to live on main level with bedroom/bathroom  Home Access: Ramped entrance  Bathroom Shower/Tub: Walk-in shower  Bathroom Toilet: Handicapped height  Bathroom Equipment: Shower chair with back, Grab bars around toilet (will be getting a grab-bar in the shower.)   Prior Function:  Level of [place]: Independent with ADLs and functional transfers  Receives Help From:  (Spouse assists with laundry)  ADL Assistance: Independent  Homemaking Assistance: Independent  Ambulatory Assistance: Independent  Hand Dominance: Right  IADL History:  IADL Comments: + drive  ADL:  Eating Assistance: Independent  Grooming Assistance: Independent  Bathing Assistance: Stand by  Bathing Deficit:  (set-up anticipate.)  LE Dressing Assistance: Minimal  LE Dressing Deficit: Thread RLE into pants, Thread LLE into pants, Pull up over hips  Activity Tolerance:  Endurance: Endurance does not limit participation in activity  Balance:  Dynamic Sitting Balance  Dynamic Sitting-Balance Support: Bilateral upper extremity supported  Dynamic Sitting-Level of Assistance: Close supervision  Dynamic Standing Balance  Dynamic Standing-Balance Support: Bilateral upper extremity supported  Dynamic Standing-Level of Assistance: Contact guard  Dynamic Standing-Comments: using a wheeled walker  Static Sitting Balance  Static Sitting-Balance Support: No upper extremity supported  Static Sitting-Level of Assistance: Close supervision  Static Standing Balance  Static Standing-Balance Support: Bilateral upper extremity supported  Static Standing-Level of Assistance: Contact guard  Bed Mobility/Transfers: Bed Mobility  Bed Mobility:  Yes  Bed Mobility 1  Bed Mobility 1: Supine to sitting  Level of Assistance 1: Contact guard, Minimal verbal cues  Bed Mobility 2  Bed Mobility  2: Sitting to supine  Level of Assistance 2: Contact guard, Minimal verbal cues   and Transfers  Transfer: Yes  Transfer 1  Transfer From 1: Bed to  Transfer to 1: Stand  Technique 1: Sit to stand  Transfer Device 1: Walker  Transfer Level of Assistance 1: Contact guard, Minimal verbal cues  Trials/Comments 1: 1  Transfers 2  Transfer From 2: Stand to  Transfer to 2: Bed  Technique 2: Stand to sit  Transfer Device 2: Walker  Transfer Level of Assistance 2: Contact guard, Minimal verbal cues  IADL's:   IADL Comments: + drive  Vision: Vision - Basic Assessment  Current Vision: No visual deficits   and Vision - Complex Assessment  Ocular Range of Motion: Within Functional Limits  Sensation:  Light Touch: No apparent deficits       Perception:  Inattention/Neglect: Appears intact  Coordination:  Movements are Fluid and Coordinated: Yes   Hand Function:  Hand Function  Gross Grasp: Functional  Coordination: Functional  Extremities: RUE   RUE :  (R shoulder AROM ~ 60 degrees flex, R elbow to digits AROM WFL.), LUE   LUE:  (L shoulder AROM ~ 60 degrees flex, L elbow to digits AROM WFL.),  ,     Outcome Measures: [facility] Daily Activity  Putting on and taking off regular lower body clothing: A little  Bathing (including washing, rinsing, drying): A little  Putting on and taking off regular upper body clothing: A little  Toileting, which includes using toilet, bedpan or urinal: A little  Taking care of personal grooming such as brushing teeth: None  Eating Meals: None  Daily Activity - Total Score: 20         ,     OT Adult Other Outcome Measures  4AT: negative    Education Documentation  Body Mechanics, taught by Reina Cohen OT at 12/10/2024  2:20 PM.  Learner: Patient  Readiness: Acceptance  Method: Explanation  Response: Verbalizes Understanding    ADL Training, taught by Reina  VICENTE Cohen at 12/10/2024  2:20 PM.  Learner: Patient  Readiness: Acceptance  Method: Explanation  Response: Verbalizes Understanding    Education Comments  No comments found.        Goals:   Encounter Problems       Encounter Problems (Active)       ADLs       Patient will perform UB and LB bathing  with modified independent level of assistance and grab bars. (Progressing)       Start:  12/10/24    Expected End:  12/31/24            Patient with complete lower body dressing with modified independent level of assistance donning and doffing all LE clothes  with PRN adaptive equipment while edge of bed  (Progressing)       Start:  12/10/24    Expected End:  12/31/24            Patient will complete toileting including hygiene clothing management/hygiene with modified independent level of assistance and grab bars. (Progressing)       Start:  12/10/24    Expected End:  12/31/24               BALANCE       Pt will maintain dynamic standing balance during ADL task with modified independent level of assistance in order to demonstrate decreased risk of falling and improved postural control. (Progressing)       Start:  12/10/24    Expected End:  12/31/24               MOBILITY       Patient will perform Functional mobility min Household distances/Community Distances with modified independent level of assistance and least restrictive device in order to improve safety and functional mobility. (Progressing)       Start:  12/10/24    Expected End:  12/31/24               TRANSFERS       Patient will perform bed mobility modified independent level of assistance and bed rails in order to improve safety and independence with mobility (Progressing)       Start:  12/10/24    Expected End:  12/31/24            Patient will complete sit to stand transfer with modified independent level of assistance and least restrictive device in order to improve safety and prepare for out of bed mobility. (Progressing)       Start:  12/10/24     Expected End:  12/31/24                     12/10/24 at 2:21 PM   Reina Cohen OTR/OTD  Rehab Office: [phone]

## 2024-12-10 NOTE — CARE PLAN
The patient's goals for the shift include  pain   Problem: Pain  Goal: Takes deep breaths with improved pain control throughout the shift  12/9/2024 1912 by Stephany Altman RN  Outcome: Progressing  12/9/2024 1857 by Stephany Altman RN  Outcome: Progressing     Problem: Pain  Goal: Turns in bed with improved pain control throughout the shift  12/9/2024 1912 by Stephany Altman RN  Outcome: Progressing  12/9/2024 1857 by Stephany Altman RN  Outcome: Progressing     Problem: Pain  Goal: Walks with improved pain control throughout the shift  12/9/2024 1912 by Stephany Altman RN  Outcome: Progressing  12/9/2024 1857 by Stephany Altman RN  Outcome: Progressing   management    The clinical goals for the shift include pain management

## 2024-12-10 NOTE — CARE PLAN
The clinical goals for the shift include pain management. Minimal pain complaints     No neurological assessment changes. Routine medications given without issues. Beatty patent and I&O monitored. No drainage from nasal passage.     Problem: Pain  Goal: Free from acute confusion related to pain meds throughout the shift  Outcome: Progressing     Problem: Pain - Adult  Goal: Verbalizes/displays adequate comfort level or baseline comfort level  Outcome: Progressing     Problem: Safety - Adult  Goal: Free from fall injury  Outcome: Progressing

## 2024-12-11 VITALS
RESPIRATION RATE: 16 BRPM | DIASTOLIC BLOOD PRESSURE: 58 MMHG | HEART RATE: 49 BPM | TEMPERATURE: 97 F | SYSTOLIC BLOOD PRESSURE: 109 MMHG | OXYGEN SATURATION: 94 %

## 2024-12-11 LAB
ACTH PLAS-MCNC: 10.4 PG/ML (ref 7.2–63.3)
ACTH PLAS-MCNC: 32.5 PG/ML (ref 7.2–63.3)
ACTH PLAS-MCNC: 60.3 PG/ML (ref 7.2–63.3)
ALBUMIN SERPL BCP-MCNC: 3.1 G/DL (ref 3.4–5)
ANION GAP SERPL CALC-SCNC: 11 MMOL/L (ref 10–20)
BUN SERPL-MCNC: 21 MG/DL (ref 6–23)
CALCIUM SERPL-MCNC: 7.9 MG/DL (ref 8.6–10.6)
CHLORIDE SERPL-SCNC: 107 MMOL/L (ref 98–107)
CO2 SERPL-SCNC: 23 MMOL/L (ref 21–32)
CREAT SERPL-MCNC: 1.11 MG/DL (ref 0.5–1.3)
EGFRCR SERPLBLD CKD-EPI 2021: 65 ML/MIN/1.73M*2
GLUCOSE SERPL-MCNC: 113 MG/DL (ref 74–99)
IGF-I SERPL-MCNC: 16 NG/ML (ref 16–176)
IGF-I SERPL-MCNC: 20 NG/ML (ref 16–176)
IGF-I Z-SCORE SERPL: -1.9
IGF-I Z-SCORE SERPL: -2.3
PHOSPHATE SERPL-MCNC: 1.9 MG/DL (ref 2.5–4.9)
POTASSIUM SERPL-SCNC: 3.6 MMOL/L (ref 3.5–5.3)
SODIUM SERPL-SCNC: 137 MMOL/L (ref 136–145)

## 2024-12-11 PROCEDURE — 2500000004 HC RX 250 GENERAL PHARMACY W/ HCPCS (ALT 636 FOR OP/ED)

## 2024-12-11 PROCEDURE — 99024 POSTOP FOLLOW-UP VISIT: CPT | Performed by: NEUROLOGICAL SURGERY

## 2024-12-11 PROCEDURE — 99239 HOSP IP/OBS DSCHRG MGMT >30: CPT | Performed by: PHYSICIAN ASSISTANT

## 2024-12-11 PROCEDURE — 2500000001 HC RX 250 WO HCPCS SELF ADMINISTERED DRUGS (ALT 637 FOR MEDICARE OP): Performed by: STUDENT IN AN ORGANIZED HEALTH CARE EDUCATION/TRAINING PROGRAM

## 2024-12-11 PROCEDURE — 2500000001 HC RX 250 WO HCPCS SELF ADMINISTERED DRUGS (ALT 637 FOR MEDICARE OP): Performed by: PHYSICIAN ASSISTANT

## 2024-12-11 PROCEDURE — 84100 ASSAY OF PHOSPHORUS: CPT | Performed by: PHYSICIAN ASSISTANT

## 2024-12-11 PROCEDURE — 99232 SBSQ HOSP IP/OBS MODERATE 35: CPT | Performed by: INTERNAL MEDICINE

## 2024-12-11 PROCEDURE — 2500000001 HC RX 250 WO HCPCS SELF ADMINISTERED DRUGS (ALT 637 FOR MEDICARE OP)

## 2024-12-11 PROCEDURE — 36415 COLL VENOUS BLD VENIPUNCTURE: CPT | Performed by: PHYSICIAN ASSISTANT

## 2024-12-11 RX ORDER — POLYETHYLENE GLYCOL 3350 17 G/17G
17 POWDER, FOR SOLUTION ORAL 2 TIMES DAILY
Qty: 10 PACKET | Refills: 0 | Status: SHIPPED | OUTPATIENT
Start: 2024-12-11 | End: 2024-12-16

## 2024-12-11 RX ORDER — AMOXICILLIN 250 MG
2 CAPSULE ORAL 2 TIMES DAILY
Qty: 20 TABLET | Refills: 0 | Status: SHIPPED | OUTPATIENT
Start: 2024-12-11 | End: 2024-12-16

## 2024-12-11 RX ORDER — OXYCODONE HYDROCHLORIDE 5 MG/1
5 TABLET ORAL EVERY 6 HOURS PRN
Qty: 20 TABLET | Refills: 0 | Status: SHIPPED | OUTPATIENT
Start: 2024-12-11 | End: 2024-12-16

## 2024-12-11 ASSESSMENT — PAIN DESCRIPTION - LOCATION
LOCATION: HEAD
LOCATION: HEAD

## 2024-12-11 ASSESSMENT — PAIN - FUNCTIONAL ASSESSMENT
PAIN_FUNCTIONAL_ASSESSMENT: 0-10

## 2024-12-11 ASSESSMENT — PAIN SCALES - GENERAL
PAINLEVEL_OUTOF10: 0 - NO PAIN
PAINLEVEL_OUTOF10: 3
PAINLEVEL_OUTOF10: 2
PAINLEVEL_OUTOF10: 2

## 2024-12-11 NOTE — PROGRESS NOTES
Department of Neurosurgery  Daily Progress Note    Patient Name: Dakota Pugh  MRN: [id number]  Date:  12/11/24     Subjective  Blew his nose overnight and started have some bleeding that resolved     Objective    Vital Signs  /56 (BP Location: Left arm, Patient Position: Lying)   Pulse 55   Temp 36.1 °C (97 °F) (Temporal)   Resp 16   SpO2 94%      Physical Exam  Awake, Ox3  Left eye 20/40. Right eye 20/20.   No epistaxis or rhinorrhea. Crusted blood   LUE 5/5  LLE 5/5  RUE 5/5  RLE 5/5  SILT    Diagnostics   Results for orders placed or performed during the hospital encounter of 12/09/24 (from the past 24 hours)   Renal Function Panel   Result Value Ref Range    Glucose 135 (H) 74 - 99 mg/dL    Sodium 138 136 - 145 mmol/L    Potassium 3.7 3.5 - 5.3 mmol/L    Chloride 105 98 - 107 mmol/L    Bicarbonate 27 21 - 32 mmol/L    Anion Gap 10 10 - 20 mmol/L    Urea Nitrogen 15 6 - 23 mg/dL    Creatinine 0.91 0.50 - 1.30 mg/dL    eGFR 83 >60 mL/min/1.73m*2    Calcium 8.0 (L) 8.6 - 10.6 mg/dL    Phosphorus 2.6 2.5 - 4.9 mg/dL    Albumin 3.5 3.4 - 5.0 g/dL   Cortisol   Result Value Ref Range    Cortisol 16.3 2.5 - 20.0 ug/dL   DHEA-Sulfate   Result Value Ref Range    DHEA Sulfate 5 (L) 28 - 290 ug/dL   Prolactin   Result Value Ref Range    Prolactin 12.6 2.0 - 18.0 ug/L   Thyroid Stimulating Hormone   Result Value Ref Range    Thyroid Stimulating Hormone 0.44 0.44 - 3.98 mIU/L   Thyroxine, Free   Result Value Ref Range    Thyroxine, Free 0.90 0.78 - 1.48 ng/dL   Cortisol   Result Value Ref Range    Cortisol 6.1 2.5 - 20.0 ug/dL   DHEA-Sulfate   Result Value Ref Range    DHEA Sulfate 5 (L) 28 - 290 ug/dL   Prolactin   Result Value Ref Range    Prolactin 12.0 2.0 - 18.0 ug/L   Thyroid Stimulating Hormone   Result Value Ref Range    Thyroid Stimulating Hormone 0.39 (L) 0.44 - 3.98 mIU/L   Thyroxine, Free   Result Value Ref Range    Thyroxine, Free 0.88 0.78 - 1.48 ng/dL   FSH & LH   Result Value Ref Range     Follicle Stimulating Hormone 4.2 IU/L    Luteinizing Hormone 1.7 IU/L   Estradiol   Result Value Ref Range    Estradiol <19 pg/mL     Current Medications  Scheduled medications  acetaminophen, 650 mg, oral, q6h LAYTON  calcium carbonate, 1,250 mg, oral, Daily  cholecalciferol, 1,000 Units, oral, Daily  famotidine, 20 mg, oral, BID  heparin (porcine), 5,000 Units, subcutaneous, q8h  pantoprazole, 40 mg, oral, q PM  polyethylene glycol, 17 g, oral, BID  rosuvastatin, 10 mg, oral, Daily  sennosides-docusate sodium, 2 tablet, oral, BID  sodium chloride, 2 spray, Each Nostril, 5x daily    Continuous medications     PRN medications  PRN medications: hydrALAZINE, labetaloL, naloxone, ondansetron **OR** ondansetron, oxyCODONE, oxyCODONE     Assessment/Plan  Dakota Pugh is a 84 y.o. male with a past medical history of HLD, peptic ulcer disease and osteoporosis p/w bitemporal hemianopsia 12/9 s/p EEA TSR.     # S/p EEA for TSR of pituitary adenoma  -Follow up with ENT regarding home going recs  -Follow up with Enodcrinology regarding home going recs  - Encourage ambulation, pending PT/OT assessment   - Strict I&Os at least every 2 hours   - Monitor VS/pulse ox [id number]   - Monitor AM CBC/RFP    # Hx of peptic ulcer disease   - Continue home protonix 40mg daily and pepcid 20mg BID     # Hx HLD   - Continue home Crestor 10mg daily     # Hx osteoporosis   - Continue home calcium carbonate 1250mg daily and cholecalciferol 1000units daily     Prophylaxis:   - DVT: SQ heparin [id number], SCDs, encourage ambulation   - Encourage IS x10 every hour while awake     FEN/GI:  - Monitor/replete electrolytes PRN   - Continue regular diet   - GI protection with Protonix 40mg daily   - Bowel regimen: Scheduled Miralax and pericolace daily        Disposition: medically ready for discharge. Follow up appointment with ENT scheduled 12/18 and 12/23, endocrinology 2/4 and ophthalmology 10/6, 3 month neurosurgery follow up requested.

## 2024-12-11 NOTE — PROGRESS NOTES
Dakota Pugh is a 84 y.o. male on day 2 of admission presenting with Pituitary adenoma (Multi).    Subjective   Patient is seen and examined this AM.  Urine remains concentrated.   Minimal nasal bleeding overnight - resolved.   Planned for Discharge this morning.    Objective   Physical Exam  Vitals:    12/11/24 0800   BP: 109/58   Pulse: 56   Resp: 11   Temp: 36.1 °C (97 °F)   SpO2: 94%   CCO3   HEENT: PERRLA   Chest: CTA, GBAE, No Wheezes  Abdo: +BS, Soft, Nt, no HSM   LE: +pp no edema   Neuro: No delay in relaxation phase.   Last Recorded Vitals  Blood pressure 109/58, pulse 56, temperature 36.1 °C (97 °F), temperature source Temporal, resp. rate 11, SpO2 94%.  Intake/Output last 3 Shifts:  I/O last 3 completed shifts:  In: 540 [P.O.:540]  Out: 1845 [Urine:1845]    Relevant Results      Scheduled medications  acetaminophen, 650 mg, oral, q6h LAYTON  calcium carbonate, 1,250 mg, oral, Daily  cholecalciferol, 1,000 Units, oral, Daily  famotidine, 20 mg, oral, BID  heparin (porcine), 5,000 Units, subcutaneous, q8h  pantoprazole, 40 mg, oral, q PM  polyethylene glycol, 17 g, oral, BID  rosuvastatin, 10 mg, oral, Daily  sennosides-docusate sodium, 2 tablet, oral, BID  sodium chloride, 2 spray, Each Nostril, 5x daily      Continuous medications     PRN medications  PRN medications: hydrALAZINE, labetaloL, naloxone, ondansetron **OR** ondansetron, oxyCODONE, oxyCODONE  Results for orders placed or performed during the hospital encounter of 12/09/24 (from the past 24 hours)   Cortisol   Result Value Ref Range    Cortisol 6.1 2.5 - 20.0 ug/dL   DHEA-Sulfate   Result Value Ref Range    DHEA Sulfate 5 (L) 28 - 290 ug/dL   Prolactin   Result Value Ref Range    Prolactin 12.0 2.0 - 18.0 ug/L   Thyroid Stimulating Hormone   Result Value Ref Range    Thyroid Stimulating Hormone 0.39 (L) 0.44 - 3.98 mIU/L   Thyroxine, Free   Result Value Ref Range    Thyroxine, Free 0.88 0.78 - 1.48 ng/dL   FSH & LH   Result Value Ref Range     Follicle Stimulating Hormone 4.2 IU/L    Luteinizing Hormone 1.7 IU/L   Estradiol   Result Value Ref Range    Estradiol <19 pg/mL     *Note: Due to a large number of results and/or encounters for the requested time period, some results have not been displayed. A complete set of results can be found in Results Review.     Current Facility-Administered Medications:     acetaminophen (Tylenol) tablet 650 mg, 650 mg, oral, q6h LAYTON, Jimmy Roth MD, 650 mg at 12/11/24 0540    calcium carbonate (Oscal) tablet 1,250 mg, 1,250 mg, oral, Daily, Arsenio Allen MD, 1,250 mg at 12/11/24 0843    cholecalciferol (Vitamin D-3) tablet 1,000 Units, 1,000 Units, oral, Daily, Arsenio Allen MD, 1,000 Units at 12/11/24 0843    famotidine (Pepcid) tablet 20 mg, 20 mg, oral, BID, Flaquita Moreno PA-C, 20 mg at 12/11/24 0843    heparin (porcine) injection 5,000 Units, 5,000 Units, subcutaneous, q8h, Jimmy Roth MD, 5,000 Units at 12/11/24 0541    hydrALAZINE (Apresoline) injection 10 mg, 10 mg, intravenous, q20 min PRN, Jimmy Roth MD    labetaloL (Normodyne,Trandate) injection 10 mg, 10 mg, intravenous, q10 min PRN, Jimmy Roth MD    naloxone (Narcan) injection 0.2 mg, 0.2 mg, intravenous, q5 min PRN, Jimmy Roth MD    ondansetron (Zofran) tablet 4 mg, 4 mg, oral, q8h PRN **OR** ondansetron (Zofran) injection 4 mg, 4 mg, intravenous, q8h PRN, Jimmy Roth MD    oxyCODONE (Roxicodone) immediate release tablet 10 mg, 10 mg, oral, q4h PRN, Jimmy Roht MD    oxyCODONE (Roxicodone) immediate release tablet 5 mg, 5 mg, oral, q4h PRN, Jimmy Roth MD    pantoprazole (ProtoNix) EC tablet 40 mg, 40 mg, oral, q PM, Arsenio Allen MD, 40 mg at 12/10/24 2119    polyethylene glycol (Glycolax, Miralax) packet 17 g, 17 g, oral, BID, Jimmy Roth MD    rosuvastatin (Crestor) tablet 10 mg, 10 mg, oral, Daily, Arsenio Allen MD, 10 mg at 12/10/24 2118    sennosides-docusate sodium (Kendra-Colace) 8.6-50 mg per tablet 2 tablet, 2  tablet, oral, BID, Jimmy Roth MD, 2 tablet at 12/11/24 0843    sodium chloride (Ocean) 0.65 % nasal spray 2 spray, 2 spray, Each Nostril, 5x daily, Santo Cedeno MD, 2 spray at 12/11/24 0844     Assessment/Plan   Assessment & Plan  Pituitary adenoma (Multi)  Dakota Pugh is a 84 y.o. male presenting with HLD, peptic ulcer disease, osteoporosis, closed fracture of L femur, who was evaluated by ophthalmology due to history of cataracts.  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  Patient  was then referred to endocrinology for evaluation of pituitary function pre-op and subsequently undergone EEA PSR of pituitary macroadenoma on 12/9/24 at 0730 AM.   Endocrinology consulted  s/p resection of pit adenoma.      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 Denise.   - S/p EEA PSR on 12/9/24 at 0730  - No pre- or intraoperative steroids given  - Cortisol levels 19.3 at 1115, 12.5 0752 (post-resection); 13.9 two months ago     - ACTH, Cortisol, DHEAs, DHEA, Prolactin, and RFP 6 hours post-surgery ~2PM - reviewed with appropriate Stress Response  - RFP 12 hours postsurgery ~6PM  - stable Na of 137.   - ACTH, Cortisol, DHEAs, DHEA, Prolactin, TSH, FT4, and RFP 24 hours post-surgery ~0700 on 12/10/24 - reviewed with appropriate Stress Response/ no evidence of DI/ Prolactin - similar range of 12.   Patient had monitoring of accurate urine output: for urine output >300 ml/hour for 2 consecutive hrs (or 600 ml after 2 hours).   No evidence of polyuria - urine concentrated - Na stable at 138 postoperatively.  Pathology results to be followed. Possibly Nonfunctioning in nature.   Plan was discussed and finalized with the attending  endocrinologist, Dr. Thomas. Patient has an appointment scheduled in 2 weeks with Dr. Denise.   Pituitary panel and RFP ordered - due in 1 week.   Thank you for the courtesy of the consult.    Zandra Raines MD

## 2024-12-11 NOTE — CARE PLAN
The clinical goals for the shift include Patient will remain injury free. MET    No neurological changes in assessment. Minimal pain, Routine medication effective. Patient did blow nose during shift, causing small amount of bleeding from left nares. Using urinal, voiding good. VS within normal limits.     Problem: Pain  Goal: Free from acute confusion related to pain meds throughout the shift  Outcome: Progressing     Problem: Pain - Adult  Goal: Verbalizes/displays adequate comfort level or baseline comfort level  Outcome: Progressing     Problem: Safety - Adult  Goal: Free from fall injury  Outcome: Progressing

## 2024-12-11 NOTE — ASSESSMENT & PLAN NOTE
Dakota Pugh is a 84 y.o. male presenting with HLD, peptic ulcer disease, osteoporosis, closed fracture of L femur, who was evaluated by ophthalmology due to history of cataracts.  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  Patient  was then referred to endocrinology for evaluation of pituitary function pre-op and subsequently undergone EEA PSR of pituitary macroadenoma on 12/9/24 at 0730 AM.   Endocrinology consulted  s/p resection of pit adenoma.      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 Denise.   - S/p EEA PSR on 12/9/24 at 0730  - No pre- or intraoperative steroids given  - Cortisol levels 19.3 at 1115, 12.5 0752 (post-resection); 13.9 two months ago     - ACTH, Cortisol, DHEAs, DHEA, Prolactin, and RFP 6 hours post-surgery ~2PM - reviewed with appropriate Stress Response  - RFP 12 hours postsurgery ~6PM  - stable Na of 137.   - ACTH, Cortisol, DHEAs, DHEA, Prolactin, TSH, FT4, and RFP 24 hours post-surgery ~0700 on 12/10/24 - reviewed with appropriate Stress Response/ no evidence of DI/ Prolactin - similar range of 12.   Patient had monitoring of accurate urine output: for urine output >300 ml/hour for 2 consecutive hrs (or 600 ml after 2 hours).   No evidence of polyuria - urine concentrated - Na stable at 138 postoperatively.  Pathology results to be followed. Possibly Nonfunctioning in nature.   Plan was discussed and finalized with the attending endocrinologist, Dr. Thomas. Patient has an appointment scheduled in 2 weeks with Dr. Denise.   Pituitary panel and RFP ordered - due in 1 week.   Thank you for the courtesy of the consult.

## 2024-12-11 NOTE — DISCHARGE SUMMARY
Discharge Diagnosis  Pituitary adenoma (Multi)    Test Results Pending At Discharge  Pending Labs       Order Current Status    Adrenocorticotropic Hormone (ACTH) In process    Adrenocorticotropic Hormone (ACTH) In process    Adrenocorticotropic Hormone (ACTH) In process    Adrenocorticotropic Hormone (ACTH) In process    DHEA In process    DHEA In process    DHEA In process    DHEA In process    DHEA In process    DHEA In process    DHEA In process    Insulin-Like Growth Factor 1 In process    Surgical Pathology Exam In process    Testosterone,Free and Total In process          Hospital Course  Dakota Pugh is a 84 y.o. male with a past medical history of HLD, peptic ulcer disease and osteoporosis who presented with bitemporal hemianopsia and found to have pituitary adenoma. Patient taken to the OR on 12/9 for EEA TSR and admitted to the neurosurgery service post operatively. Endocrinology followed patient during admission for assistance with hormonal management and interpretation of labs. At discharge recommended repeat of pituitary labs in 1 week. PT/OT evaluated patient and recommended no further needs at discharge. On the day of discharge, the pt was tolerating a diet, pain was controlled on PO pain medication, and they were ambulating and voiding spontaneously. They were discharged in stable condition with detailed instructions and scheduled outpatient follow up.    Pertinent Physical Exam At Time of Discharge  Constitutional: A&Ox3, calm and cooperative, NAD.  Eyes: Left eye 20/40. Right eye 20/20.   ENMT: Moist mucous membranes. No epistaxis or rhinorrhea. Crusted blood.    Head/Neck: Neck supple, no JVD. NC/AT.   Cardiovascular: Normal rate and regular rhythm. 2+ equal pulses of the distal extremities.  Respiratory/Thorax: CTAB, regular respirations on RA. Good symmetric chest expansion.   Gastrointestinal: Abdomen soft, non tender.   Genitourinary: Beatty in place with minimal pale urine to collecting  bag.   Extremities: No peripheral edema. Moving all 4 extremities actively with 5/5 strength.   Neurological: A&Ox3.   Psychological: Appropriate mood and behavior.   Skin: Warm and dry.     Home Medications     Medication List      START taking these medications     oxyCODONE 5 mg immediate release tablet; Commonly known as: Roxicodone;   Take 1 tablet (5 mg) by mouth every 6 hours if needed for severe pain (7 -   10) for up to 5 days.   polyethylene glycol 17 gram packet; Commonly known as: Glycolax,   Miralax; Take 17 g by mouth 2 times a day for 5 days.   sennosides-docusate sodium 8.6-50 mg tablet; Commonly known as:   Kendra-Colace; Take 2 tablets by mouth 2 times a day for 5 days.   sodium chloride 0.65 % nasal spray; Commonly known as: Ocean; Administer   2 sprays into each nostril 5 times a day.     CONTINUE taking these medications     calcium carbonate 500 mg calcium (1,250 mg) tablet; Commonly known as:   Oscal   calcium carbonate-vitamin D3 600 mg-5 mcg (200 unit) tablet   cholecalciferol 25 MCG (1000 UT) tablet; Commonly known as: Vitamin D-3   famotidine 20 mg tablet; Commonly known as: Pepcid; Take 1 tablet (20   mg) by mouth 2 times a day.   fish oil concentrate 120-180 mg capsule; Commonly known as: Omega-3   pantoprazole 40 mg EC tablet; Commonly known as: ProtoNix; Take 1 tablet   (40 mg) by mouth once daily in the evening.   PRESERVISION AREDS ORAL   rosuvastatin 10 mg tablet; Commonly known as: Crestor; Take 1 tablet (10   mg) by mouth once daily.   Tylenol Extra Strength 500 mg tablet; Generic drug: acetaminophen     STOP taking these medications     denosumab 60 mg/mL syringe; Commonly known as: Prolia     ASK your doctor about these medications     amoxicillin 500 mg capsule; Commonly known as: Amoxil     Outpatient Follow-Up  Future Appointments   Date Time Provider Department Center   12/18/2024  2:15 PM Vivi NGUYEN MD [id number] [facility]   12/23/2024  2:30 PM Ramon Orona MD  [id number] [facility]   12/31/2024  2:20 PM Antoine Denise MD [id number] [facility]   1/16/2025  1:00 PM INF 01 LOUISA Echavarria [facility]   3/13/2025  1:00 PM Natalie Bhardwaj, APRN-CNP [id number] [facility]   10/6/2025  2:00 PM Ariana Bonilla MD [id number] [facility]   11/7/2025  1:20 PM Cristi Verma DO [id number] [facility]       Flaquita Moreno PA-C    Total face to face time spent with patient/family of >30 minutes, with >50% of the time spent discussing plan of care/management, counseling/educating on disease processes, explaining results of diagnostic testing.

## 2024-12-12 LAB
ACTH PLAS-MCNC: 7 PG/ML (ref 7.2–63.3)
IGF-I SERPL-MCNC: 20 NG/ML (ref 16–176)
IGF-I Z-SCORE SERPL: -1.9

## 2024-12-13 LAB
ATRIAL RATE: 53 BPM
DHEA SERPL-MCNC: 0.09 NG/ML (ref 0.63–4.7)
DHEA SERPL-MCNC: 0.11 NG/ML (ref 0.63–4.7)
DHEA SERPL-MCNC: 0.12 NG/ML (ref 0.63–4.7)
DHEA SERPL-MCNC: 0.14 NG/ML (ref 0.63–4.7)
DHEA SERPL-MCNC: 0.16 NG/ML (ref 0.63–4.7)
DHEA SERPL-MCNC: <0.05 NG/ML (ref 0.63–4.7)
P AXIS: 10 DEGREES
P OFFSET: 176 MS
P ONSET: 120 MS
PR INTERVAL: 212 MS
Q ONSET: 226 MS
QRS COUNT: 9 BEATS
QRS DURATION: 82 MS
QT INTERVAL: 448 MS
QTC CALCULATION(BAZETT): 420 MS
QTC FREDERICIA: 430 MS
R AXIS: 6 DEGREES
T AXIS: 33 DEGREES
T OFFSET: 450 MS
VENTRICULAR RATE: 53 BPM

## 2024-12-15 LAB
DHEA SERPL-MCNC: 0.06 NG/ML (ref 0.63–4.7)
TESTOSTERONE FREE (CHAN): 2.1 PG/ML (ref 30–135)
TESTOSTERONE,TOTAL,LC-MS/MS: 19 NG/DL (ref 250–1100)

## 2024-12-18 ENCOUNTER — APPOINTMENT (OUTPATIENT)
Dept: OTOLARYNGOLOGY | Facility: CLINIC | Age: 84
End: 2024-12-18
Payer: MEDICARE

## 2024-12-18 ENCOUNTER — LAB (OUTPATIENT)
Dept: LAB | Facility: LAB | Age: 84
End: 2024-12-18
Payer: MEDICARE

## 2024-12-18 VITALS — BODY MASS INDEX: 25.48 KG/M2 | HEIGHT: 71 IN | WEIGHT: 182 LBS

## 2024-12-18 DIAGNOSIS — D49.7 PITUITARY TUMOR: Primary | ICD-10-CM

## 2024-12-18 DIAGNOSIS — J34.3 HYPERTROPHY OF INFERIOR NASAL TURBINATE: ICD-10-CM

## 2024-12-18 DIAGNOSIS — R09.82 POST-NASAL DRAINAGE: ICD-10-CM

## 2024-12-18 DIAGNOSIS — E23.7 PITUITARY ABNORMALITY (MULTI): ICD-10-CM

## 2024-12-18 DIAGNOSIS — J32.3 CHRONIC SPHENOIDAL SINUSITIS: ICD-10-CM

## 2024-12-18 LAB
ALBUMIN SERPL BCP-MCNC: 4.3 G/DL (ref 3.4–5)
ANION GAP SERPL CALC-SCNC: 12 MMOL/L (ref 10–20)
BUN SERPL-MCNC: 24 MG/DL (ref 6–23)
CALCIUM SERPL-MCNC: 9.2 MG/DL (ref 8.6–10.6)
CHLORIDE SERPL-SCNC: 106 MMOL/L (ref 98–107)
CO2 SERPL-SCNC: 27 MMOL/L (ref 21–32)
CREAT SERPL-MCNC: 1.05 MG/DL (ref 0.5–1.3)
EGFRCR SERPLBLD CKD-EPI 2021: 70 ML/MIN/1.73M*2
GLUCOSE SERPL-MCNC: 95 MG/DL (ref 74–99)
PHOSPHATE SERPL-MCNC: 3 MG/DL (ref 2.5–4.9)
POTASSIUM SERPL-SCNC: 4.7 MMOL/L (ref 3.5–5.3)
SODIUM SERPL-SCNC: 140 MMOL/L (ref 136–145)

## 2024-12-18 PROCEDURE — 1160F RVW MEDS BY RX/DR IN RCRD: CPT | Performed by: OTOLARYNGOLOGY

## 2024-12-18 PROCEDURE — 31231 NASAL ENDOSCOPY DX: CPT | Performed by: OTOLARYNGOLOGY

## 2024-12-18 PROCEDURE — 82533 TOTAL CORTISOL: CPT

## 2024-12-18 PROCEDURE — 80069 RENAL FUNCTION PANEL: CPT

## 2024-12-18 PROCEDURE — 1111F DSCHRG MED/CURRENT MED MERGE: CPT | Performed by: OTOLARYNGOLOGY

## 2024-12-18 PROCEDURE — 1157F ADVNC CARE PLAN IN RCRD: CPT | Performed by: OTOLARYNGOLOGY

## 2024-12-18 PROCEDURE — 82627 DEHYDROEPIANDROSTERONE: CPT

## 2024-12-18 PROCEDURE — 84146 ASSAY OF PROLACTIN: CPT

## 2024-12-18 PROCEDURE — 1159F MED LIST DOCD IN RCRD: CPT | Performed by: OTOLARYNGOLOGY

## 2024-12-18 PROCEDURE — 1036F TOBACCO NON-USER: CPT | Performed by: OTOLARYNGOLOGY

## 2024-12-18 PROCEDURE — 99024 POSTOP FOLLOW-UP VISIT: CPT | Performed by: OTOLARYNGOLOGY

## 2024-12-18 PROCEDURE — 36415 COLL VENOUS BLD VENIPUNCTURE: CPT

## 2024-12-18 PROCEDURE — 82024 ASSAY OF ACTH: CPT

## 2024-12-18 ASSESSMENT — PATIENT HEALTH QUESTIONNAIRE - PHQ9
1. LITTLE INTEREST OR PLEASURE IN DOING THINGS: NOT AT ALL
2. FEELING DOWN, DEPRESSED OR HOPELESS: NOT AT ALL
SUM OF ALL RESPONSES TO PHQ9 QUESTIONS 1 AND 2: 0

## 2024-12-18 NOTE — PROGRESS NOTES
"Referring Provider: No ref. provider found    History of Present Illness:    Dakota Pugh is a 84 y.o. male, presenting for follow-up after undergoing endoscopic endonasal approach to the sella for resection of pituitary adenoma with Dr. Orona and Aidan.  Since surgery he has been doing well.  He has not had his pituitary labs drawn yet.  He states that he can breathe well through his nose.  No bleeding or other issues.  He never filled his narcotic prescription and is complained of very little pain.  ?  Review of Systems:    Review of symptoms was negative except for those stated including Cardiopulmonary, Genitourinary, Gastrointestinal, Psychological, Sleep pattern, Endocrine, Eyes, Neurologic, Musculoskeletal, Skin, Hematologic/Lymphatic and Allergic/Immunologic.     Medical History:    I have reviewed the patient's updated past medical history, surgical history, family history, social history, as well as current medications and allergies as of 12/18/2024. Changes to these items have been updated and marked as reviewed in the electronic medical record.    Physical Exam:    Vitals:  height is 1.803 m (5' 11\") and weight is 82.6 kg (182 lb).   General: Patient doing well overall and is in no apparent distress.  Psych: Pleasant affect, and answers questions appropriately.  Head & Face: Symmetric facial movements  Eyes: Pupils equal, round, reactive.  Extraocular movements intact without gaze restrictions or nystagmus. No epiphora.  Ears:  External auditory canals are normal.  Tympanic membranes are clear.  No middle ear effusion is seen.  All middle ear landmarks are normal.  Nose: Anterior rhinoscopy revealed normal sinonasal mucosa. More posterior areas of the nasal cavity could not be completely examined.  Oral Cavity/Oropharynx:  Without lesions or masses to visual exam.  Neck: Supple without lymphadenopathy.  Lungs: Non-labored, and without evidence of stridor.  Cardiac: Pulses are strong, " well-perfused.  Extremities: Without gross evidence of clubbing, cyanosis, or edema.  Neuro: Cranial nerves II-XII grossly intact; Intact facial movements.    Procedure:  Rigid nasal endoscopy ([id number])  Pre-procedure diagnosis/Indication for procedure:  To evaluate areas not visualized on anterior rhinoscopy   Anesthesia:  None  Description:  A 0-degree 3-mm rigid nasal endoscope was used to examine the left and right nasal cavities.  The nasal valve areas were examined for abnormalities or collapse.  The inferior and middle turbinates were evaluated.  The middle and superior meatuses, and the sphenoethmoid recesses were examined and inspected for mucopurulence and polyps. Once the endoscope was withdrawn, the patient was noted to have tolerated the procedure well without complications and was returned to ambulatory status.    Findings:    The middle meatus and nasal cavity are clear bilaterally.  The common sphenoid cavity is filled with obstructing material however it is quite hard and crusted.  No evidence of any infection.      Assessment:     Dakota Pugh is a 84 y.o. male with pituitary adenoma status post endoscopic endonasal resection.  Healing well.    Plan:      Recommend continued saline irrigations and follow-up in 1 week with Dr. Orona.      Vivi Chen MD, M.Eng.   of Otolaryngology - Head & Neck Surgery  Division of Rhinology and Endoscopic Skull Base Surgery  [facility]\

## 2024-12-19 ENCOUNTER — TELEPHONE (OUTPATIENT)
Dept: PRIMARY CARE | Facility: CLINIC | Age: 84
End: 2024-12-19
Payer: MEDICARE

## 2024-12-19 LAB
CORTIS SERPL-MCNC: 12.6 UG/DL (ref 2.5–20)
DHEA-S SERPL-MCNC: 7 UG/DL (ref 28–290)
PROLACTIN SERPL-MCNC: 13.8 UG/L (ref 2–18)

## 2024-12-19 NOTE — TELEPHONE ENCOUNTER
Perfecto is hoping to speak with you re: his recent surgery and after effects.   She states that 1 call to them is sufficient.

## 2024-12-20 LAB
ACTH PLAS-MCNC: 15.5 PG/ML (ref 7.2–63.3)
LAB AP ASR DISCLAIMER: NORMAL
LABORATORY COMMENT REPORT: NORMAL
PATH REPORT.COMMENTS IMP SPEC: NORMAL
PATH REPORT.FINAL DX SPEC: NORMAL
PATH REPORT.GROSS SPEC: NORMAL
PATH REPORT.RELEVANT HX SPEC: NORMAL
PATH REPORT.TOTAL CANCER: NORMAL

## 2024-12-20 NOTE — TELEPHONE ENCOUNTER
Called back to patient yesterday  Seems to doing very well on his recovery, had some questions about his lab tests as he had not heard anything  Did note that I am not the expert in this but based on the findings it really appears there is nothing concerning with his lab testing -but would still encouraged to await final word from specialist

## 2024-12-20 NOTE — PROGRESS NOTES
Sinus & Skull Base Surgery    Chief Complaint:  1.  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  Bilateral inferior turbinate hypertrophy  3.  Postnasal drainage  4.  Headaches    History Of Present Illness:  Reason for this visit:    Dakota Pugh presents for his second postoperative evaluation since undergoing endoscopic skull base surgery December 9, 2024.    He has done very well since the operation.  He has been using saline rinses since being seen by my partner last week.  He denies significant nasal bleeding or discomfort.    Prior to the operation he was having blurriness to his vision within the left eye.  This has slightly changed in the postoperative setting and that the blurriness is slightly more medial than it was before.  No other acute changes.    Active Problems:  Patient Active Problem List   Diagnosis    Acute post-operative pain    Anemia    Diverticulitis of colon    Fatigue    Intra-abdominal abscess (Multi)    Gastrocutaneous fistula    Elevated WBC count    CINDI (acute kidney injury) (CMS-HCC)    Physical deconditioning    On total parenteral nutrition (TPN)    Recurrent major depressive disorder, in partial remission (CMS-HCC)    Orthostatic hypotension    Orthostatic dizziness    Abdominal pain    Acute abdominal pain    Intractable vomiting with nausea    Hyponatremia    Severe protein-calorie malnutrition (Multi)    Abnormal fasting glucose    Elevated glucose    Abnormality of lung on chest x-ray    Arthritis of shoulder region, right    Bladder cancer (Multi)    Carpal tunnel syndrome, bilateral upper limbs    Chorioretinal scar of left eye    Dermatochalasis    Drusen of right macula    Essential familial hypercholesterolemia    Femur fracture, left    Fracture of shaft of right femur with routine healing    Groin pain    History of shoulder surgery    Hyperlipidemia    Iliotibial band tendinitis of right side    Knee pain    Liver cyst  Telephone Encounter by Lori Buckner at 04/14/17 12:34 PM     Author:  Lori Buckner Service:  (none) Author Type:       Filed:  04/14/17 12:35 PM Encounter Date:  4/14/2017 Status:  Signed     :  Lori Buckner ()              EVA DUMONT    Patient Age: 48 year old    ACCT STATUS: COLLECTIONS  MESSAGE:[MO1.1T]   Questions on diabetic testing sig?  Requesting clinical advice.[MO1.1M]   Next and Last Visit with Provider and Department  Next visit with SOHEILA LOPEZ is on No match found  Next visit with [facility] is on No match found  Last visit with SOHEILA LOPEZ was on 01/19/2017 at  5:45 PM in [facility]  Last visit with [facility] was on 01/19/2017 at  5:45 PM in [facility]     WEIGHT AND HEIGHT: As of 01/19/2017 weight is 273 lbs.(123.832 kg). Height is 5' 5\"(1.651 m).   BMI is 45.43 kg/(m^2) calculated from:     Height 5' 5\" (1.651 m) as of 1/19/17     Weight 273 lb (123.832 kg) as of 1/19/17      No Known Allergies  Current outpatient prescriptions       Medication  Sig Dispense Refill   • cyclobenzaprine (FLEXERIL) 10 MG tablet TAKE 1 TAB BY MOUTH 3 (THREE) TIMES DAILY AS NEEDED. 90 Tab 0   • atenolol (TENORMIN) 50 MG tablet TAKE ONE TABLET BY MOUTH ONE TIME DAILY 90 Tab 0   • levothyroxine 125 MCG tablet TAKE 1 TABLET BY MOUTH DAILY. 90 Tab 1   • metformin (GLUCOPHAGE-XR) 500 MG 24 hr tablet TAKE 1 TABLET BY MOUTH TWICE DAILY 180 Tab 0   • lisinopril (PRINIVIL,ZESTRIL) 10 MG tablet TAKE ONE TABLET BY MOUTH ONE TIME DAILY 90 Tab 0   • esomeprazole (NEXIUM) 40 MG Cap Take 1 Cap by mouth daily. 30 Cap 7   • loratadine (CLARITIN) 10 MG tablet TAKE ONE TABLET BY MOUTH ONE TIME DAILY 30 Tab 8   • betamethasone dipropionate 0.05 % ointment Apply a thin layer to affected area nightly prn itch 50 g 0   • fluticasone (FLONASE) 50 MCG/ACT nasal spray Use 2 Sprays in each nostril daily. 16 g 2   • Multiple Vitamin (MULTIVITAMIN OR)     Lower back pain    Lumbar neuritis    Age-related nuclear cataract of left eye    Age-related nuclear cataract of right eye    Combined form of age-related cataract, both eyes    Cortical age-related cataract of left eye    Cortical age-related cataract of right eye    Mixed type age-related cataract, left eye    Mixed type age-related cataract, right eye    Myopia with presbyopia    Neuropathy    Numbness and tingling of hand    Osteoporosis    Shoulder pain    SOB (shortness of breath)    Tubular adenoma of colon    Vitreous floaters    Hip pain, bilateral    Leg pain    Limb pain    Pain of left lower extremity    Cyst, kidney, acquired    Dizziness    Acute cystitis without hematuria    Peptic ulcer disease with hemorrhage    Shoulder arthritis    Muscle weakness    Elevated alkaline phosphatase level    EKG, abnormal    Basal cell carcinoma of skin of other parts of face    Chest pain    Chronic pain of left lower extremity    At risk for constipation    Need for prophylactic antibiotic    Overweight with body mass index (BMI) of 28 to 28.9 in adult    Status post total replacement of left shoulder    Status post total replacement of right shoulder    Pituitary adenoma (Multi)     Past Medical History:  He has a past medical history of Arthritis, Bladder cancer (Multi), Cataract, Chronic bronchitis (Multi), Dyslipidemia, GERD (gastroesophageal reflux disease), Glaucoma, History of blood transfusion, Hyperlipidemia, Iron deficiency, Low BP, Osteoporosis, Other conditions influencing health status, Pelvic mass, Personal history of malignant neoplasm of bladder (09/13/2018), and Stomach ulcer.    Surgical History:  He has a past surgical history that includes Tonsillectomy (12/11/2013); Total hip arthroplasty (Bilateral, 12/11/2013); Bladder surgery (04/28/2015); Total shoulder arthroplasty (Bilateral); Femur fracture surgery (Bilateral); and Colonoscopy.    Family History:  Family History   Problem Relation  Take  by mouth daily.     • Omega-3 Fatty Acids (FISH OIL) 1000 MG CAPS Take 2,000 mg by mouth daily. 90 Cap 0      PHARMACY to use: [address]          Pharmacy preference(s) on file: [address]    CALL BACK INFO:[MO1.1T] Ok to leave response (including medical information) on answering machine[MO1.1M]  ROUTING:[MO1.1T] Patient's physician/staff[MO1.1M]        PCP: Celeste Jackson MD         INS: Payor: [employer] / Plan: *No Plan* / Product Type: *No Product type* / Note: This is the primary coverage, but no account was found for this location or the patient's primary location.   ADDRESS:  [address] [facility] [address][MO1.1T]       Revision History        User Key Date/Time User Provider Type Action    > MO1.1 04/14/17 12:35 PM Lori Buckner  Sign    M - Manual, T - Template             Name Age of Onset    Breast cancer Mother      Glaucoma Father      Asthma Daughter Jesika     GORAN disease Son Naun     Diabetes Son Naun     Sleep apnea Son Naun     Other (HTN) Son Naun      Social History:  He reports that he has never smoked. He has been exposed to tobacco smoke. He has never used smokeless tobacco. He reports current alcohol use of about 3.0 standard drinks of alcohol per week. He reports that he does not currently use drugs.    Allergies:  Patient has no known allergies.    Current Meds:  Current Outpatient Medications:     acetaminophen (Tylenol Extra Strength) 500 mg tablet, Take 1 tablet (500 mg) by mouth every 8 hours if needed for mild pain (1 - 3)., Disp: , Rfl:     amoxicillin (Amoxil) 500 mg capsule, , Disp: , Rfl:     calcium carbonate (Oscal) 500 mg calcium (1,250 mg) tablet, Take 1 tablet (1,250 mg) by mouth once daily., Disp: , Rfl:     calcium carbonate-vitamin D3 600 mg-5 mcg (200 unit) tablet, Take 1 tablet by mouth twice a day., Disp: , Rfl:     cholecalciferol (Vitamin D-3) 25 MCG (1000 UT) tablet, Take 1 tablet (1,000 Units) by mouth once daily., Disp: , Rfl:     famotidine (Pepcid) 20 mg tablet, Take 1 tablet (20 mg) by mouth 2 times a day. (Patient taking differently: Take 1 tablet (20 mg) by mouth once daily.), Disp: 180 tablet, Rfl: 3    fish oil concentrate (Omega-3) 120-180 mg capsule, Take 1 capsule (1 g) by mouth once daily., Disp: , Rfl:     pantoprazole (ProtoNix) 40 mg EC tablet, Take 1 tablet (40 mg) by mouth once daily in the evening., Disp: 90 tablet, Rfl: 3    rosuvastatin (Crestor) 10 mg tablet, Take 1 tablet (10 mg) by mouth once daily., Disp: 100 tablet, Rfl: 3    sodium chloride (Ocean) 0.65 % nasal spray, Administer 2 sprays into each nostril 5 times a day., Disp: 30 mL, Rfl: 12    vit A/vit C/vit E/zinc/copper (PRESERVISION AREDS ORAL), Take 1 tablet by mouth 2 times a day., Disp: , Rfl:     Vitals:  Visit Vitals  Smoking Status Never     Physical Exam:  Nose:  On external exam there are neither lesions nor asymmetry of the nasal tip/dorsum. On anterior rhinoscopy, visualization posteriorly is limited on anterior examination. For this reason, to adequately evaluate posteriorly for masses, source of epistaxis, polypoid disease, debridement, and/or signs of infections, nasal endoscopy is indicated.  (Please see procedure below.)    SINONASAL ENDOSCOPY WITH DEBRIDEMENT (CPT 83582-30-71):  Due to the patient's chronic sinusitis/chronic rhinitis, sinonasal endoscopy with debridement is indicated. After discussion of risks and benefits, and topical decongestion and anesthesia, an endoscope was used to perform nasal endoscopy with debridement.  A timeout identifying the patient, the procedure, and any concerns was performed prior to beginning the procedure.    Findings:  Examination of each nasal cavity demonstrated no significant crusting within the right or the left side to the nasopharynx.  Hard crusting was removed within the posterior ethmoid cavity bilaterally.  Following this, I was able to suction blood product and mucoid debris from the lateral aspect of each sphenoid.  I left some packing material over the free graft itself.  There was no purulence or evidence of CSF leak.  He tolerated the procedure very well.    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    Provider Impressions:  1.  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  Bilateral inferior turbinate hypertrophy  3.  Postnasal drainage  4.  Headaches    Discussion:  Dakota Pugh appeared well on examination today.  We discussed his vision and there has been a slight change in his vision in the left eye from his preoperative condition.  I will communicate with the neurosurgery team about this.  He is scheduled see my neurosurgery partner in March of next  year.    In regard to his nose and sinuses, he appeared very well and I asked him to continue rinses once or twice per day.  I asked him to follow-up with me in about 2 months.  I asked him to continue to avoid strenuous activity until he is 3 weeks out from his surgical date.  All questions were answered.    Scribe Attestation  By signing my name below, I, Kristopher Quispe, Violetaibe, attest that this documentation has been prepared under the direction and in the presence of Ramon Orona MD.    Signature:  Ramon Orona MD

## 2024-12-23 ENCOUNTER — APPOINTMENT (OUTPATIENT)
Dept: OTOLARYNGOLOGY | Facility: CLINIC | Age: 84
End: 2024-12-23
Payer: MEDICARE

## 2024-12-23 VITALS — WEIGHT: 180 LBS | TEMPERATURE: 97.1 F | HEIGHT: 71 IN | BODY MASS INDEX: 25.2 KG/M2

## 2024-12-23 DIAGNOSIS — J01.20 ACUTE NON-RECURRENT ETHMOIDAL SINUSITIS: ICD-10-CM

## 2024-12-23 DIAGNOSIS — J01.30 ACUTE NON-RECURRENT SPHENOIDAL SINUSITIS: Primary | ICD-10-CM

## 2024-12-23 DIAGNOSIS — J34.89 NASAL CRUSTING: ICD-10-CM

## 2024-12-23 PROCEDURE — 99024 POSTOP FOLLOW-UP VISIT: CPT | Performed by: OTOLARYNGOLOGY

## 2024-12-23 PROCEDURE — 1157F ADVNC CARE PLAN IN RCRD: CPT | Performed by: OTOLARYNGOLOGY

## 2024-12-23 PROCEDURE — 31237 NSL/SINS NDSC SURG BX POLYPC: CPT | Performed by: OTOLARYNGOLOGY

## 2024-12-23 PROCEDURE — 1111F DSCHRG MED/CURRENT MED MERGE: CPT | Performed by: OTOLARYNGOLOGY

## 2024-12-31 ENCOUNTER — APPOINTMENT (OUTPATIENT)
Dept: ENDOCRINOLOGY | Facility: CLINIC | Age: 84
End: 2024-12-31
Payer: MEDICARE

## 2024-12-31 VITALS
BODY MASS INDEX: 25.48 KG/M2 | HEART RATE: 50 BPM | SYSTOLIC BLOOD PRESSURE: 95 MMHG | DIASTOLIC BLOOD PRESSURE: 47 MMHG | WEIGHT: 182 LBS | HEIGHT: 71 IN

## 2024-12-31 DIAGNOSIS — D35.2 PITUITARY ADENOMA (MULTI): Primary | ICD-10-CM

## 2024-12-31 PROCEDURE — 99214 OFFICE O/P EST MOD 30 MIN: CPT | Performed by: INTERNAL MEDICINE

## 2024-12-31 PROCEDURE — 1036F TOBACCO NON-USER: CPT | Performed by: INTERNAL MEDICINE

## 2024-12-31 PROCEDURE — 1159F MED LIST DOCD IN RCRD: CPT | Performed by: INTERNAL MEDICINE

## 2024-12-31 PROCEDURE — 1126F AMNT PAIN NOTED NONE PRSNT: CPT | Performed by: INTERNAL MEDICINE

## 2024-12-31 PROCEDURE — 1111F DSCHRG MED/CURRENT MED MERGE: CPT | Performed by: INTERNAL MEDICINE

## 2024-12-31 PROCEDURE — 1157F ADVNC CARE PLAN IN RCRD: CPT | Performed by: INTERNAL MEDICINE

## 2024-12-31 ASSESSMENT — PAIN SCALES - GENERAL: PAINLEVEL_OUTOF10: 0-NO PAIN

## 2024-12-31 NOTE — PROGRESS NOTES
84-year-old gentleman with a PMH significant HLD, peptic ulcer disease, osteoporosis, closed fracture of L femur (requiring rehab with use of walker - improving),  who was evaluated by ophthalmology due to history of cataracts found to have bitemporal hemianopsia, and sellar mass on subsequent MRI , currently referred to endocrinology for evaluation of pituitary function pre-op.      Chief Complaint    Follow up on pituitary tumor   Mr Pugh ,a retired , is here today with his wife.     HPI    Seen here in Oct after he was diagnosed to have a large pituitary tumor. Had surgery and is here post op 12/9/2024; went well without complications. Discharged home without any replacement therapy. Feels well since discharge; no sx to suggest hypothyroidism or adrenal insufficiency. No hx to suggest DI; no headaches; improved vision. Good energy.   Background:    PMH significant HLD, peptic ulcer disease, osteoporosis, closed fracture of L femur (requiring rehab with use of walker - improving   Noticed left tem hemianopsia around Jan 2024  Seen by ophthalmo 9/2024: found to have bitemporal hemianopsia on VFT.  To mention that Mr Pugh has passed drivers license June 2023.        E:     Constitutional: NAD, Aox3, using a rolator, very pleasant  Skin/Hair: Warm, dry skin.  HEENT: EOMI, Anicteric scleras, No lid lag or lid retraction, No Chemosis, No TTP of ocular globes. Dry oral mucosa. -Chvostek sign   Neck: Soft, supple. Non tender, full ROM, no lymphadenopathy. Thyroid gland palpation: non nodular, soft consistency, non tender.   Cardiovascular: Normal s1s2, RRR  Respiratory:no increased work of bleeding  Abdomen: +BS, Soft, non-tender to palpation.  Extremities: Preserved peripheral pulses, No tremors  Neuro: Moving all extremities spontaneously. CN's grossly intact. no focal weaknss.   DTRs: mild delay in relaxation phase.  Skin: dry      Assessment/Plan   Doing well post op. Will check his labs and have him  back in 4 months.          Antoine Denise MD 12/31/24 6:52 PM     Antoine Denise MD

## 2025-01-07 ENCOUNTER — LAB (OUTPATIENT)
Dept: LAB | Facility: LAB | Age: 85
End: 2025-01-07
Payer: MEDICARE

## 2025-01-07 DIAGNOSIS — M81.0 OSTEOPOROSIS, UNSPECIFIED OSTEOPOROSIS TYPE, UNSPECIFIED PATHOLOGICAL FRACTURE PRESENCE: ICD-10-CM

## 2025-01-07 DIAGNOSIS — D35.2 PITUITARY ADENOMA (MULTI): ICD-10-CM

## 2025-01-07 LAB
ALBUMIN SERPL BCP-MCNC: 4.2 G/DL (ref 3.4–5)
ALP SERPL-CCNC: 155 U/L (ref 33–136)
ALT SERPL W P-5'-P-CCNC: 28 U/L (ref 10–52)
ANION GAP SERPL CALC-SCNC: 14 MMOL/L (ref 10–20)
AST SERPL W P-5'-P-CCNC: 44 U/L (ref 9–39)
BILIRUB SERPL-MCNC: 0.7 MG/DL (ref 0–1.2)
BUN SERPL-MCNC: 20 MG/DL (ref 6–23)
CALCIUM SERPL-MCNC: 9.1 MG/DL (ref 8.6–10.6)
CHLORIDE SERPL-SCNC: 102 MMOL/L (ref 98–107)
CO2 SERPL-SCNC: 26 MMOL/L (ref 21–32)
CORTIS SERPL-MCNC: 20.1 UG/DL (ref 2.5–20)
CREAT SERPL-MCNC: 0.94 MG/DL (ref 0.5–1.3)
DHEA-S SERPL-MCNC: 6 UG/DL (ref 28–290)
EGFRCR SERPLBLD CKD-EPI 2021: 80 ML/MIN/1.73M*2
FSH SERPL-ACNC: 4.9 IU/L
GLUCOSE SERPL-MCNC: 82 MG/DL (ref 74–99)
LH SERPL-ACNC: 2 IU/L
POTASSIUM SERPL-SCNC: 4.4 MMOL/L (ref 3.5–5.3)
PROLACTIN SERPL-MCNC: 12.2 UG/L (ref 2–18)
PROT SERPL-MCNC: 6.9 G/DL (ref 6.4–8.2)
SODIUM SERPL-SCNC: 138 MMOL/L (ref 136–145)
T4 FREE SERPL-MCNC: 1.07 NG/DL (ref 0.78–1.48)

## 2025-01-07 PROCEDURE — 82024 ASSAY OF ACTH: CPT

## 2025-01-07 PROCEDURE — 80053 COMPREHEN METABOLIC PANEL: CPT

## 2025-01-07 PROCEDURE — 84439 ASSAY OF FREE THYROXINE: CPT

## 2025-01-07 PROCEDURE — 82627 DEHYDROEPIANDROSTERONE: CPT

## 2025-01-07 PROCEDURE — 84146 ASSAY OF PROLACTIN: CPT

## 2025-01-07 PROCEDURE — 84305 ASSAY OF SOMATOMEDIN: CPT

## 2025-01-07 PROCEDURE — 82533 TOTAL CORTISOL: CPT

## 2025-01-07 PROCEDURE — 84449 ASSAY OF TRANSCORTIN: CPT

## 2025-01-07 PROCEDURE — 83002 ASSAY OF GONADOTROPIN (LH): CPT

## 2025-01-07 PROCEDURE — 36415 COLL VENOUS BLD VENIPUNCTURE: CPT

## 2025-01-07 PROCEDURE — 84402 ASSAY OF FREE TESTOSTERONE: CPT

## 2025-01-07 PROCEDURE — 83001 ASSAY OF GONADOTROPIN (FSH): CPT

## 2025-01-07 PROCEDURE — 84270 ASSAY OF SEX HORMONE GLOBUL: CPT

## 2025-01-09 LAB
ACTH PLAS-MCNC: 21.1 PG/ML (ref 7.2–63.3)
IGF-I SERPL-MCNC: 24 NG/ML (ref 16–176)
IGF-I Z-SCORE SERPL: -1.7
SHBG SERPL-SCNC: 80 NMOL/L (ref 19–76)

## 2025-01-14 LAB
TESTOSTERONE FREE (CHAN): 4.7 PG/ML (ref 30–135)
TESTOSTERONE,TOTAL,LC-MS/MS: 60 NG/DL (ref 250–1100)

## 2025-01-16 ENCOUNTER — APPOINTMENT (OUTPATIENT)
Dept: INFUSION THERAPY | Facility: CLINIC | Age: 85
End: 2025-01-16
Payer: MEDICARE

## 2025-01-16 VITALS
HEART RATE: 57 BPM | DIASTOLIC BLOOD PRESSURE: 62 MMHG | TEMPERATURE: 96.6 F | SYSTOLIC BLOOD PRESSURE: 103 MMHG | OXYGEN SATURATION: 97 % | RESPIRATION RATE: 16 BRPM

## 2025-01-16 DIAGNOSIS — M81.0 OSTEOPOROSIS, UNSPECIFIED OSTEOPOROSIS TYPE, UNSPECIFIED PATHOLOGICAL FRACTURE PRESENCE: ICD-10-CM

## 2025-01-16 PROCEDURE — 96372 THER/PROPH/DIAG INJ SC/IM: CPT | Performed by: NURSE PRACTITIONER

## 2025-01-16 RX ORDER — ALBUTEROL SULFATE 0.83 MG/ML
3 SOLUTION RESPIRATORY (INHALATION) AS NEEDED
OUTPATIENT
Start: 2025-07-09

## 2025-01-16 RX ORDER — FAMOTIDINE 10 MG/ML
20 INJECTION INTRAVENOUS ONCE AS NEEDED
OUTPATIENT
Start: 2025-07-09

## 2025-01-16 RX ORDER — EPINEPHRINE 0.3 MG/.3ML
0.3 INJECTION SUBCUTANEOUS EVERY 5 MIN PRN
OUTPATIENT
Start: 2025-07-09

## 2025-01-16 RX ORDER — DIPHENHYDRAMINE HYDROCHLORIDE 50 MG/ML
50 INJECTION INTRAMUSCULAR; INTRAVENOUS AS NEEDED
OUTPATIENT
Start: 2025-07-09

## 2025-01-16 ASSESSMENT — ENCOUNTER SYMPTOMS
EXTREMITY WEAKNESS: 0
COUGH: 0
PALPITATIONS: 0
NUMBNESS: 0
WHEEZING: 0
LEG SWELLING: 0
SHORTNESS OF BREATH: 0
WOUND: 0
LIGHT-HEADEDNESS: 0
DIZZINESS: 0

## 2025-01-16 NOTE — PATIENT INSTRUCTIONS
Today :We administered denosumab. Prolia 60mg subcutaneous injection left upper arm  Blood Calcium within 28 days of next Prolia appointment    For:   1. Osteoporosis, unspecified osteoporosis type, unspecified pathological fracture presence       Your next appointment is due in:  6 months       Please read the  Medication Guide that was given to you and reviewed during todays visit.     (Tell all doctors including dentists that you are taking this medication)     Go to the emergency room or call 911 if:  -You have signs of allergic reaction:   -Rash, hives, itching.   -Swollen, blistered, peeling skin.   -Swelling of face, lips, mouth, tongue or throat.   -Tightness of chest, trouble breathing, swallowing or talking     Call your doctor:  - If injection site gets red, warm, swollen, itchy or leaks fluid or pus.     (Leave band aid on your injection site for at least 2 hours and keep area clean and dry  - If you get sick or have symptoms of infection or are not feeling well for any reason.    (Wash your hands often, stay away from people who are sick)  - If you have side effects from your medication that do not go away or are bothersome.     (Refer to the teaching your nurse gave you for side effects to call your doctor about)    - Common side effects may include:  stuffy nose, headache, feeling tired, muscle aches, upset stomach  - Before receiving any vaccines     - Call the [facility] at   If:  - You get sick, are on antibiotics, have had a recent vaccine, have surgery or dental work and your doctor wants your visit rescheduled.  - You need to cancel and reschedule your visit for any reason. Call at least 2 days before your visit if you need to cancel.   - Your insurance changes before your next visit.    (We will need to get approval from your new insurance. This can take up to two weeks.)     The [facility] is opened Monday thru Friday. We are closed on weekends and holidays.    Voice mail will take your call if the center is closed. If you leave a message please allow 24 hours for a call back during weekdays. If you leave a message on a weekend/holiday, we will call you back the next business day.    A pharmacist is available Monday - Friday from 8:30AM to 3:30PM to help answer any questions you may have about your prescriptions(s). Please call pharmacy at:    [facility]: [phone]  [facility]: [phone]  [facility]: [phone]

## 2025-01-16 NOTE — PROGRESS NOTES
[facility]   Infusion Clinic Note   Date: 2025   Name: Dakota Pugh  : 1940   MRN: [id number]          Reason for Visit: Injections (Every 6 months Prolia 50mg subcutaneous injection)         Today: We administered denosumab.       Visit Type: INJECTION       Ordered By: Cristi Verma MD       Accompanied by: Self       Diagnosis: Osteoporosis, unspecified osteoporosis type, unspecified pathological fracture presence        Allergies:   Allergies as of 2025    (No Known Allergies)          Current Medications has a current medication list which includes the following prescription(s): acetaminophen, amoxicillin, calcium carbonate, calcium carbonate-vitamin d3, cholecalciferol, famotidine, fish oil concentrate, pantoprazole, rosuvastatin, sodium chloride, and vit a/vit c/vit e/zinc/copper.       Vitals:   Vitals:    25 1305   BP: 103/62   Pulse: 57   Resp: 16   Temp: 35.9 °C (96.6 °F)   TempSrc: Temporal   SpO2: 97%             Infusion Pre-procedure Checklist:   - Allergies reviewed: yes   - Medications reviewed: yes       - Previous reaction to current treatment: no      Assess patient for the concerns below. Document provider notification as appropriate.  - Active or recent infection with/without current antibiotic use: no  - Recent or planned invasive dental work: no  - Recent or planned surgeries: 2024, Pituitary   - Recently received or plans to receive vaccinations: no  - Has treatment related toxicities: no  - Is pregnant:  n/a      Pain: 0   - Is the pain different from normal: n/a   - Is your Doctor aware:  n/a                Fall Risk Screening: Jurado Fall Risk  History of Falling, Immediate or Within 3 Months: No  Secondary Diagnosis: Yes  Ambulatory Aid: Crutches/cane/walker (Using wheeled walker. Has a rollator at home.)  Intravenous Therapy/Heparin Lock: No  Gait/Transferring: Weak  Mental Status: Oriented to own ability  Jurado Fall Risk Score:  40       Review Of Systems:  Review of Systems   Respiratory:  Negative for cough, shortness of breath and wheezing.    Cardiovascular:  Negative for chest pain, leg swelling and palpitations.   Skin:  Negative for itching, rash and wound.   Neurological:  Negative for dizziness, extremity weakness, light-headedness and numbness.         ROS completed? Yes      Infusion Readiness:  - Assessment Concerns Related to Infusion: No  - Provider notified: n/a      Document Below Only If Indicated:   New Patient Education:    N/A (returning patient for continuation of therapy. Ongoing education provided as needed.)        Treatment Conditions & Drug Specific Questions:    Denosumab  (PROLIA. XGEVA)    (Unless otherwise specified on patient specific therapy plan):     TREATMENT CONDITIONS:  Unless otherwise specified on patient specific therapy plan HOLD and notify provider prior to proceeding with today's injection if patients:  o Calcium is LESS THAN 8.6 mg/dL OR  Ionized Calcium LESS THAN 1.1 mmol/L  o Recent or planned invasive dental procedure (within 4 weeks)    Lab Results   Component Value Date    CALCIUM 9.1 01/07/2025    PHOS 3.0 12/18/2024      Labs reviewed and patient meets treatment conditions? Yes    DRUG SPECIFIC QUESTIONS:  Is the patient taking calcium and vitamin D? Yes  (Recommended)    Pt Instructed on following risks: (1) hypocalcemia, (2) osteonecrosis of the jaw, (3) atypical femoral fractures, (4) serious infections, and (5) dermatologic reactions?  Yes      REMINDER:  PREGNANCY CATEGORY X DRUG. OBTAIN NEGTATIVE PREGNANCY TEST PRIOR TO FIRST INFUSION FOR WOMEN OF CHILDBEARING ABILITY   REMS DRUG    Recommended Vitals/Observation:  Vitals: Obtain vitals prior to injection.  Observation: Patient may leave immediately following injection.        Weight Based Drug Calculations:    WEIGHT BASED DRUGS: NOT APPLICABLE / FLAT DOSE          Initiated By: Raquel Hartley LPN

## 2025-01-18 LAB — TRANSCORTIN SER-MCNC: 2.6 MG/DL

## 2025-02-01 NOTE — PROGRESS NOTES
Sinus & Skull Base Surgery    Chief Complaint:  1.  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  Bilateral inferior turbinate hypertrophy  3.  Postnasal drainage  4.  Headaches    History Of Present Illness:  Dakota Pugh presents since last being seen 12/23/2024.     He continues with some baseline symptoms as listed below.  He is using saline rinses as needed.    Main Symptoms:  Patient does not have anterior nasal drainage.  Patient has posterior nasal drainage.  Patient does not have nasal airway obstruction.  Patient does not have facial pain.  Patient does not have facial pressure.  Patient does not have decreased sense of smell.  Associated Symptoms:  Patient does not have headaches.  Patient has throat clearing.  Secondary to post nasal drainage.  Patient does not have coughing.  Patient does not have dysphonia.  Patient does not have nasal bleeding.    Medications currently on for sinonasal symptoms:  Saline rinses PRN    Active Problems:  Patient Active Problem List   Diagnosis    Acute post-operative pain    Anemia    Diverticulitis of colon    Fatigue    Intra-abdominal abscess (Multi)    Gastrocutaneous fistula    Elevated WBC count    CINDI (acute kidney injury) (CMS-[facility])    Physical deconditioning    On total parenteral nutrition (TPN)    Recurrent major depressive disorder, in partial remission (CMS-HCC)    Orthostatic hypotension    Orthostatic dizziness    Abdominal pain    Acute abdominal pain    Intractable vomiting with nausea    Hyponatremia    Severe protein-calorie malnutrition (Multi)    Abnormal fasting glucose    Elevated glucose    Abnormality of lung on chest x-ray    Arthritis of shoulder region, right    Bladder cancer (Multi)    Carpal tunnel syndrome, bilateral upper limbs    Chorioretinal scar of left eye    Dermatochalasis    Drusen of right macula    Essential familial hypercholesterolemia    Femur fracture, left    Fracture of  shaft of right femur with routine healing    Groin pain    History of shoulder surgery    Hyperlipidemia    Iliotibial band tendinitis of right side    Knee pain    Liver cyst    Lower back pain    Lumbar neuritis    Age-related nuclear cataract of left eye    Age-related nuclear cataract of right eye    Combined form of age-related cataract, both eyes    Cortical age-related cataract of left eye    Cortical age-related cataract of right eye    Mixed type age-related cataract, left eye    Mixed type age-related cataract, right eye    Myopia with presbyopia    Neuropathy    Numbness and tingling of hand    Osteoporosis    Shoulder pain    SOB (shortness of breath)    Tubular adenoma of colon    Vitreous floaters    Hip pain, bilateral    Leg pain    Limb pain    Pain of left lower extremity    Cyst, kidney, acquired    Dizziness    Acute cystitis without hematuria    Peptic ulcer disease with hemorrhage    Shoulder arthritis    Muscle weakness    Elevated alkaline phosphatase level    EKG, abnormal    Basal cell carcinoma of skin of other parts of face    Chest pain    Chronic pain of left lower extremity    At risk for constipation    Need for prophylactic antibiotic    Overweight with body mass index (BMI) of 28 to 28.9 in adult    Status post total replacement of left shoulder    Status post total replacement of right shoulder    Pituitary adenoma (Multi)     Past Medical History:  He has a past medical history of Arthritis, Bladder cancer (Multi), Cataract, Chronic bronchitis (Multi), Dyslipidemia, GERD (gastroesophageal reflux disease), Glaucoma, History of blood transfusion, Hyperlipidemia, Iron deficiency, Low BP, Osteoporosis, Other conditions influencing health status, Pelvic mass, Personal history of malignant neoplasm of bladder (09/13/2018), and Stomach ulcer.    Surgical History:  He has a past surgical history that includes Tonsillectomy (12/11/2013); Total hip arthroplasty (Bilateral, 12/11/2013);  Bladder surgery (04/28/2015); Total shoulder arthroplasty (Bilateral); Femur fracture surgery (Bilateral); and Colonoscopy.    Family History:  Family History   Problem Relation Name Age of Onset    Breast cancer Mother      Glaucoma Father      Asthma Daughter Jesika     GORAN disease Son Naun     Diabetes Son Naun     Sleep apnea Son Naun     Other (HTN) Son Naun      Social History:  He reports that he has never smoked. He has been exposed to tobacco smoke. He has never used smokeless tobacco. He reports current alcohol use of about 3.0 standard drinks of alcohol per week. He reports that he does not currently use drugs.    Allergies:  Patient has no known allergies.    Current Meds:  Current Outpatient Medications:     acetaminophen (Tylenol Extra Strength) 500 mg tablet, Take 1 tablet (500 mg) by mouth every 8 hours if needed for mild pain (1 - 3)., Disp: , Rfl:     amoxicillin (Amoxil) 500 mg capsule, , Disp: , Rfl:     calcium carbonate (Oscal) 500 mg calcium (1,250 mg) tablet, Take 1 tablet (1,250 mg) by mouth once daily., Disp: , Rfl:     calcium carbonate-vitamin D3 600 mg-5 mcg (200 unit) tablet, Take 1 tablet by mouth twice a day., Disp: , Rfl:     cholecalciferol (Vitamin D-3) 25 MCG (1000 UT) tablet, Take 1 tablet (1,000 Units) by mouth once daily., Disp: , Rfl:     famotidine (Pepcid) 20 mg tablet, Take 1 tablet (20 mg) by mouth 2 times a day. (Patient taking differently: Take 1 tablet (20 mg) by mouth once daily.), Disp: 180 tablet, Rfl: 3    fish oil concentrate (Omega-3) 120-180 mg capsule, Take 1 capsule (1 g) by mouth once daily., Disp: , Rfl:     pantoprazole (ProtoNix) 40 mg EC tablet, Take 1 tablet (40 mg) by mouth once daily in the evening., Disp: 90 tablet, Rfl: 3    rosuvastatin (Crestor) 10 mg tablet, Take 1 tablet (10 mg) by mouth once daily., Disp: 100 tablet, Rfl: 3    sodium chloride (Ocean) 0.65 % nasal spray, Administer 2 sprays into each nostril 5 times a day., Disp: 30 mL, Rfl: 12    vit  "A/vit C/vit E/zinc/copper (PRESERVISION AREDS ORAL), Take 1 tablet by mouth 2 times a day., Disp: , Rfl:     Vitals:  Visit Vitals  Ht 1.791 m (5' 10.5\")   Wt 83.9 kg (185 lb)   BMI 26.17 kg/m²   Smoking Status Never   BSA 2.04 m²     Physical Exam:  Nose: On external exam there are neither lesions nor asymmetry of the nasal tip/dorsum. On anterior rhinoscopy, visualization posteriorly is limited on anterior examination. For this reason, to adequately evaluate posteriorly for masses, source of epistaxis, polypoid disease, debridement, and/or signs of infections, nasal endoscopy is indicated.  (Please see procedure below.)    SINONASAL ENDOSCOPY (CPT 43338-99): To better evaluate the patient's symptoms, sinonasal endoscopy is indicated.  After discussion of risks and benefits, and topical decongestion and anesthesia, an endoscope was used to perform nasal endoscopy on each side.  A time out identifying the patient, the procedure, the location of the procedure and any concerns was performed prior to beginning the procedure.    Findings:  Examination of the right nasal cavity revealed widely patent ethmoid and sphenoid sinuses with no significant crusting. Examination of the left nasal cavity revealed a widely patent ethmoid cavity.  There was crusting in the left sphenoid. There was no evidence of purulence or polyposis bilaterally at either middle meatus or sphenoethmoid recess. The free graft is well healed. No evidence of CSF leak.  There were mild septal deviations bilaterally.    Provider Impressions:  1.  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  Postnasal drainage  3.  Throat clearing  4.  Nasal crusting  5.  Deviated nasal septum    Discussion:  Dakota Pugh and I discussed his exam and symptoms.  I recommended repeat assessment in 3 to 4 months.  I think utilization of rinses as needed would make sense moving forward given that he is minimally symptomatic from the " crusting.  All questions were answered.    Scribe Attestation  By signing my name below, I, Reena Badillo, attest that this documentation has been prepared under the direction and in the presence of Ramon Orona MD.    Signature:  Ramon Orona MD

## 2025-02-04 ENCOUNTER — APPOINTMENT (OUTPATIENT)
Dept: ENDOCRINOLOGY | Facility: CLINIC | Age: 85
End: 2025-02-04
Payer: MEDICARE

## 2025-02-04 ENCOUNTER — OFFICE VISIT (OUTPATIENT)
Dept: OTOLARYNGOLOGY | Facility: CLINIC | Age: 85
End: 2025-02-04
Payer: MEDICARE

## 2025-02-04 VITALS — WEIGHT: 185 LBS | BODY MASS INDEX: 25.9 KG/M2 | HEIGHT: 71 IN

## 2025-02-04 DIAGNOSIS — J34.2 DEVIATED NASAL SEPTUM: Primary | ICD-10-CM

## 2025-02-04 DIAGNOSIS — R09.82 POST-NASAL DRAINAGE: ICD-10-CM

## 2025-02-04 DIAGNOSIS — R09.89 THROAT CLEARING: ICD-10-CM

## 2025-02-04 DIAGNOSIS — J34.89 NASAL CRUSTING: ICD-10-CM

## 2025-02-04 PROCEDURE — 1157F ADVNC CARE PLAN IN RCRD: CPT | Performed by: OTOLARYNGOLOGY

## 2025-02-04 PROCEDURE — 31231 NASAL ENDOSCOPY DX: CPT | Performed by: OTOLARYNGOLOGY

## 2025-02-04 PROCEDURE — 99024 POSTOP FOLLOW-UP VISIT: CPT | Performed by: OTOLARYNGOLOGY

## 2025-02-04 PROCEDURE — 1160F RVW MEDS BY RX/DR IN RCRD: CPT | Performed by: OTOLARYNGOLOGY

## 2025-02-04 PROCEDURE — 1159F MED LIST DOCD IN RCRD: CPT | Performed by: OTOLARYNGOLOGY

## 2025-02-04 ASSESSMENT — PATIENT HEALTH QUESTIONNAIRE - PHQ9
SUM OF ALL RESPONSES TO PHQ9 QUESTIONS 1 AND 2: 0
1. LITTLE INTEREST OR PLEASURE IN DOING THINGS: NOT AT ALL
2. FEELING DOWN, DEPRESSED OR HOPELESS: NOT AT ALL

## 2025-02-07 DIAGNOSIS — D35.2 PITUITARY ADENOMA (MULTI): Primary | ICD-10-CM

## 2025-03-10 ENCOUNTER — HOSPITAL ENCOUNTER (OUTPATIENT)
Dept: RADIOLOGY | Facility: HOSPITAL | Age: 85
Discharge: HOME | End: 2025-03-10
Payer: MEDICARE

## 2025-03-10 DIAGNOSIS — D35.2 PITUITARY ADENOMA (MULTI): ICD-10-CM

## 2025-03-10 PROCEDURE — 70553 MRI BRAIN STEM W/O & W/DYE: CPT

## 2025-03-10 PROCEDURE — A9575 INJ GADOTERATE MEGLUMI 0.1ML: HCPCS | Performed by: NURSE PRACTITIONER

## 2025-03-10 PROCEDURE — 2550000001 HC RX 255 CONTRASTS: Performed by: NURSE PRACTITIONER

## 2025-03-10 RX ORDER — GADOTERATE MEGLUMINE 376.9 MG/ML
15 INJECTION INTRAVENOUS
Status: COMPLETED | OUTPATIENT
Start: 2025-03-10 | End: 2025-03-10

## 2025-03-10 RX ADMIN — GADOTERATE MEGLUMINE 15 ML: 376.9 INJECTION INTRAVENOUS at 13:40

## 2025-03-11 ENCOUNTER — APPOINTMENT (OUTPATIENT)
Dept: OPHTHALMOLOGY | Facility: CLINIC | Age: 85
End: 2025-03-11
Payer: MEDICARE

## 2025-03-11 DIAGNOSIS — H53.47 BITEMPORAL HEMIANOPIA: Primary | ICD-10-CM

## 2025-03-11 PROCEDURE — 99213 OFFICE O/P EST LOW 20 MIN: CPT | Performed by: OPHTHALMOLOGY

## 2025-03-11 PROCEDURE — 92083 EXTENDED VISUAL FIELD XM: CPT | Performed by: OPHTHALMOLOGY

## 2025-03-11 ASSESSMENT — TONOMETRY
OS_IOP_MMHG: 12
OD_IOP_MMHG: 11
IOP_METHOD: GOLDMANN APPLANATION

## 2025-03-11 ASSESSMENT — VISUAL ACUITY
OS_CC: 20/30
OD_CC: 20/20
OD_CC+: -1
CORRECTION_TYPE: GLASSES
METHOD: SNELLEN - LINEAR

## 2025-03-11 ASSESSMENT — CUP TO DISC RATIO
OD_RATIO: .3
OS_RATIO: .3

## 2025-03-11 ASSESSMENT — SLIT LAMP EXAM - LIDS
COMMENTS: DERMATOCHALASIS
COMMENTS: DERMATOCHALASIS

## 2025-03-11 ASSESSMENT — EXTERNAL EXAM - LEFT EYE: OS_EXAM: NORMAL

## 2025-03-11 ASSESSMENT — EXTERNAL EXAM - RIGHT EYE: OD_EXAM: NORMAL

## 2025-03-11 ASSESSMENT — ENCOUNTER SYMPTOMS: EYES NEGATIVE: 1

## 2025-03-11 NOTE — PROGRESS NOTES
Assessment/Plan   Diagnoses and all orders for this visit:  Bitemporal hemianopia  -     Martinez Visual Field - OU - Both Eyes  Vision stable, vf improved    Repeat VF and refraction/DFE 6 mo

## 2025-03-13 ENCOUNTER — OFFICE VISIT (OUTPATIENT)
Dept: NEUROSURGERY | Facility: CLINIC | Age: 85
End: 2025-03-13
Payer: MEDICARE

## 2025-03-13 ENCOUNTER — APPOINTMENT (OUTPATIENT)
Dept: OPHTHALMOLOGY | Facility: CLINIC | Age: 85
End: 2025-03-13
Payer: MEDICARE

## 2025-03-13 VITALS
TEMPERATURE: 96.1 F | BODY MASS INDEX: 24.5 KG/M2 | HEIGHT: 71 IN | SYSTOLIC BLOOD PRESSURE: 125 MMHG | WEIGHT: 175 LBS | DIASTOLIC BLOOD PRESSURE: 68 MMHG | HEART RATE: 59 BPM

## 2025-03-13 DIAGNOSIS — D35.2 PITUITARY ADENOMA (MULTI): Primary | ICD-10-CM

## 2025-03-13 PROCEDURE — 1159F MED LIST DOCD IN RCRD: CPT | Performed by: NURSE PRACTITIONER

## 2025-03-13 PROCEDURE — 1126F AMNT PAIN NOTED NONE PRSNT: CPT | Performed by: NURSE PRACTITIONER

## 2025-03-13 PROCEDURE — 99211 OFF/OP EST MAY X REQ PHY/QHP: CPT | Performed by: NURSE PRACTITIONER

## 2025-03-13 PROCEDURE — 1157F ADVNC CARE PLAN IN RCRD: CPT | Performed by: NURSE PRACTITIONER

## 2025-03-13 ASSESSMENT — PAIN SCALES - GENERAL: PAINLEVEL_OUTOF10: 0-NO PAIN

## 2025-03-13 NOTE — PROGRESS NOTES
[facility]  Neurosurgery    History of Present Illness                        Objective      Vitals:   There were no vitals taken for this visit.        Physical Exam:            Relevant Results:            Assessment & Plan      Diagnosis:  {There are no diagnoses linked to this encounter. (Refresh or delete this SmartLink)}        Provider Impression:         Medical History     Past Medical History:   Diagnosis Date    Arthritis     Bladder cancer (Multi)     stage 1 several years ago, s/p resection    Cataract     Chronic bronchitis (Multi)     seen by Dr. Ernesto Bermudez on 10/03/24    Dyslipidemia     GERD (gastroesophageal reflux disease)     Glaucoma     History of blood transfusion     transfused in 2023    Hyperlipidemia     Iron deficiency     Low BP     Osteoporosis     Other conditions influencing health status     Myalgia and myositis    Pelvic mass     Personal history of malignant neoplasm of bladder 09/13/2018    History of malignant neoplasm of bladder    Stomach ulcer      Past Surgical History:   Procedure Laterality Date    BLADDER SURGERY  04/28/2015    Bladder Surgery    COLONOSCOPY      FEMUR FRACTURE SURGERY Bilateral     TONSILLECTOMY  12/11/2013    Tonsillectomy With Adenoidectomy    TOTAL HIP ARTHROPLASTY Bilateral 12/11/2013    Total Hip Replacement    TOTAL SHOULDER ARTHROPLASTY Bilateral      Social History     Tobacco Use    Smoking status: Never     Passive exposure: Past    Smokeless tobacco: Never   Substance Use Topics    Alcohol use: Yes     Alcohol/week: 3.0 standard drinks of alcohol     Types: 1 Glasses of wine, 1 Cans of beer, 1 Shots of liquor per week     Comment: SOCIAL 2 TO 3    Drug use: Not Currently     Family History   Problem Relation Name Age of Onset    Breast cancer Mother      Glaucoma Father      Asthma Daughter Jesika     GORAN disease Son Naun     Diabetes Son Naun     Sleep apnea Son Naun     Other (HTN) Son Naun      No Known Allergies  Current  Outpatient Medications   Medication Instructions    acetaminophen (Tylenol Extra Strength) 500 mg tablet 1 tablet, Every 8 hours PRN    amoxicillin (Amoxil) 500 mg capsule     calcium carbonate (Oscal) 500 mg calcium (1,250 mg) tablet 1 tablet, Daily    calcium carbonate-vitamin D3 600 mg-5 mcg (200 unit) tablet 1 tablet, 2 times daily    cholecalciferol (VITAMIN D-3) 1,000 Units, Daily    famotidine (PEPCID) 20 mg, oral, 2 times daily    fish oil concentrate (Omega-3) 120-180 mg capsule 1 capsule, Daily    pantoprazole (PROTONIX) 40 mg, oral, Every evening    rosuvastatin (CRESTOR) 10 mg, oral, Daily    sodium chloride (Ocean) 0.65 % nasal spray 2 sprays, Each Nostril, 5 times daily    vit A/vit C/vit E/zinc/copper (PRESERVISION AREDS ORAL) 1 tablet, 2 times daily                           Drug use: Not Currently     Family History   Problem Relation Name Age of Onset    Breast cancer Mother      Glaucoma Father      Asthma Daughter Jesika     GORAN disease Son Naun     Diabetes Son Naun     Sleep apnea Son Naun     Other (HTN) Son Naun      No Known Allergies  Current Outpatient Medications   Medication Instructions    acetaminophen (Tylenol Extra Strength) 500 mg tablet 1 tablet, Every 8 hours PRN    amoxicillin (Amoxil) 500 mg capsule     calcium carbonate (Oscal) 500 mg calcium (1,250 mg) tablet 1 tablet, Daily    calcium carbonate-vitamin D3 600 mg-5 mcg (200 unit) tablet 1 tablet, 2 times daily    cholecalciferol (VITAMIN D-3) 1,000 Units, Daily    famotidine (PEPCID) 20 mg, oral, 2 times daily    fish oil concentrate (Omega-3) 120-180 mg capsule 1 capsule, Daily    pantoprazole (PROTONIX) 40 mg, oral, Every evening    rosuvastatin (CRESTOR) 10 mg, oral, Daily    sodium chloride (Ocean) 0.65 % nasal spray 2 sprays, Each Nostril, 5 times daily    vit A/vit C/vit E/zinc/copper (PRESERVISION AREDS ORAL) 1 tablet, 2 times daily

## 2025-04-02 ENCOUNTER — TUMOR BOARD CONFERENCE (OUTPATIENT)
Dept: HEMATOLOGY/ONCOLOGY | Facility: HOSPITAL | Age: 85
End: 2025-04-02
Payer: MEDICARE

## 2025-04-02 NOTE — TUMOR BOARD NOTE
CNS Tumor Board Recommendations       Patient was presented by *** at our CNS Tumor Board on *** which included representatives from Radiation oncology, Surgical oncology, Neuro-oncology, Pathology, Radiology, Research, Neurosurgery, Social Work (Neurosurgery).     Current patient presents with history of the following treatment history:     MRI 03/10/25 with significant debulking in lesion with nodularity / fluid collections that could be post surgical vs remaining lesion.     The CNS Tumor Board tumor board considered available treatment options and made the following recommendations: MRI sella w/wo and Dotate PET in 6 months.     Clinical Trial Status: ***     National site-specific guidelines were discussed with respect to the case.

## 2025-06-05 ENCOUNTER — TELEPHONE (OUTPATIENT)
Dept: PRIMARY CARE | Facility: CLINIC | Age: 85
End: 2025-06-05
Payer: MEDICARE

## 2025-06-05 NOTE — TELEPHONE ENCOUNTER
Patient had an obstruction approximately 2 years ago.  Was in the shower today and noted a small lump on his belly, about 2 inches up from his belly button.  No pain or discomfort.  His wife believes it is a hernia.  He questions if he should be seen ASAP or if this could wait for his physical next month.     Per discussion with Dr. Brody - this can wait until next month.  Patient understood and agreeable to this plan.

## 2025-06-09 DIAGNOSIS — E78.5 HYPERLIPIDEMIA, UNSPECIFIED HYPERLIPIDEMIA TYPE: ICD-10-CM

## 2025-06-09 RX ORDER — ROSUVASTATIN CALCIUM 10 MG/1
10 TABLET, COATED ORAL DAILY
Qty: 90 TABLET | Refills: 0 | Status: SHIPPED | OUTPATIENT
Start: 2025-06-09

## 2025-06-09 NOTE — PROGRESS NOTES
Sinus & Skull Base Surgery    Chief Complaint:  1.  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  Postnasal drainage  3.  Throat clearing  4.  Nasal crusting  5.  Deviated nasal septum    History Of Present Illness:  Dakota Pugh presents since last being seen 2/4/2025.     He mentioned a more than 10-year history of ear ringing.  This does not particularly bother him and does not seem to impact his hearing.    Main Symptoms:  Patient does not have anterior nasal drainage.  Patient has posterior nasal drainage.  After eating he will bring out mucous.    Patient has nasal airway obstruction.  At night is a mouth breather (long term).  Patient does not have facial pain.  Patient does not have facial pressure.  Patient does not have decreased sense of smell.   Associated Symptoms:  Patient does not have headaches.  Patient does not have throat clearing.  Patient does not have coughing.  Patient does not have dysphonia.  Patient does not have nasal bleeding.    Medications currently on for sinonasal symptoms:  None.    Active Problems:  Problem List[1]    Past Medical History:  He has a past medical history of Arthritis, Bladder cancer (Multi), Cataract, Chronic bronchitis (Multi), Dyslipidemia, GERD (gastroesophageal reflux disease), Glaucoma, History of blood transfusion, Hyperlipidemia, Iron deficiency, Low BP, Osteoporosis, Other conditions influencing health status, Pelvic mass, Personal history of malignant neoplasm of bladder (09/13/2018), and Stomach ulcer.    Surgical History:  He has a past surgical history that includes Tonsillectomy (12/11/2013); Total hip arthroplasty (Bilateral, 12/11/2013); Bladder surgery (04/28/2015); Total shoulder arthroplasty (Bilateral); Femur fracture surgery (Bilateral); and Colonoscopy.    Family History:  Family History[2]    Social History:  He reports that he has never smoked. He has been exposed to tobacco smoke. He has never  used smokeless tobacco. He reports current alcohol use of about 3.0 standard drinks of alcohol per week. He reports that he does not currently use drugs.    Allergies:  Patient has no known allergies.    Current Meds:  Current Medications[3]    Vitals:  Visit Vitals  Wt 79.4 kg (175 lb)   BMI 24.75 kg/m²   Smoking Status Never   BSA 1.99 m²     Physical Exam:  Nose: On external exam there are neither lesions nor asymmetry of the nasal tip/dorsum. On anterior rhinoscopy, visualization posteriorly is limited on anterior examination. For this reason, to adequately evaluate posteriorly for masses, source of epistaxis, polypoid disease, debridement, and/or signs of infections, nasal endoscopy is indicated.  (Please see procedure below.)    SINONASAL ENDOSCOPY (CPT 24845): To better evaluate the patient's symptoms, sinonasal endoscopy is indicated.  After discussion of risks and benefits, and topical decongestion and anesthesia, an endoscope was used to perform nasal endoscopy on each side.  A time out identifying the patient, the procedure, the location of the procedure and any concerns was performed prior to beginning the procedure.    Findings:  Examination of each nasal cavity demonstrated a patent ethmoid and sphenoid.  There was expected scarring posteriorly within the sphenoid.  No significant crusting was noted.  Each middle meatus and sphenoethmoid recess were normal without pus or polyps.  He has a septal deviation bilaterally.    Provider Impressions:  1.  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  Postnasal drainage  3.  Nasal airway obstruction  4.  Tinnitus  5.  Deviated nasal septum    Discussion:  Dakota Pugh appeared very well on examination today.  He was comfortable with his baseline symptoms so I recommended follow-up with me as needed.    In regard to the tinnitus, this workup would begin with an audiogram.  Because it is longstanding, he was comfortable with  observation currently but if he would like to obtain this at any time of happy to place the order.  All questions were answered.    Scribe Attestation  By signing my name below, I, Reena Badillo, attest that this documentation has been prepared under the direction and in the presence of Ramon Orona MD.    Signature:  Ramon Orona MD       [1]   Patient Active Problem List  Diagnosis    Acute post-operative pain    Anemia    Diverticulitis of colon    Fatigue    Intra-abdominal abscess (Multi)    Gastrocutaneous fistula    Elevated WBC count    CINDI (acute kidney injury)    Physical deconditioning    On total parenteral nutrition (TPN)    Recurrent major depressive disorder, in partial remission    Orthostatic hypotension    Orthostatic dizziness    Abdominal pain    Acute abdominal pain    Intractable vomiting with nausea    Hyponatremia    Severe protein-calorie malnutrition (Multi)    Abnormal fasting glucose    Elevated glucose    Abnormality of lung on chest x-ray    Arthritis of shoulder region, right    Bladder cancer (Multi)    Carpal tunnel syndrome, bilateral upper limbs    Chorioretinal scar of left eye    Dermatochalasis    Drusen of right macula    Essential familial hypercholesterolemia    Femur fracture, left    Fracture of shaft of right femur with routine healing    Groin pain    History of shoulder surgery    Hyperlipidemia    Iliotibial band tendinitis of right side    Knee pain    Liver cyst    Lower back pain    Lumbar neuritis    Age-related nuclear cataract of left eye    Age-related nuclear cataract of right eye    Combined form of age-related cataract, both eyes    Cortical age-related cataract of left eye    Cortical age-related cataract of right eye    Mixed type age-related cataract, left eye    Mixed type age-related cataract, right eye    Myopia with presbyopia    Neuropathy    Numbness and tingling of hand    Osteoporosis    Shoulder pain    SOB (shortness of  breath)    Tubular adenoma of colon    Vitreous floaters    Hip pain, bilateral    Leg pain    Limb pain    Pain of left lower extremity    Cyst, kidney, acquired    Dizziness    Acute cystitis without hematuria    Peptic ulcer disease with hemorrhage    Shoulder arthritis    Muscle weakness    Elevated alkaline phosphatase level    EKG, abnormal    Basal cell carcinoma of skin of other parts of face    Chest pain    Chronic pain of left lower extremity    At risk for constipation    Need for prophylactic antibiotic    Overweight with body mass index (BMI) of 28 to 28.9 in adult    Status post total replacement of left shoulder    Status post total replacement of right shoulder    Pituitary adenoma (Multi)   [2]   Family History  Problem Relation Name Age of Onset    Breast cancer Mother      Glaucoma Father      Asthma Daughter Jesika     GORAN disease Son Naun     Diabetes Son Naun     Sleep apnea Son Naun     Other (HTN) Son Naun    [3]   Current Outpatient Medications:     acetaminophen (Tylenol Extra Strength) 500 mg tablet, Take 1 tablet (500 mg) by mouth every 8 hours if needed for mild pain (1 - 3)., Disp: , Rfl:     amoxicillin (Amoxil) 500 mg capsule, , Disp: , Rfl:     calcium carbonate (Oscal) 500 mg calcium (1,250 mg) tablet, Take 1 tablet by mouth once daily., Disp: , Rfl:     calcium carbonate-vitamin D3 600 mg-5 mcg (200 unit) tablet, Take 1 tablet by mouth twice a day., Disp: , Rfl:     cholecalciferol (Vitamin D-3) 25 MCG (1000 UT) tablet, Take 1 tablet (1,000 Units) by mouth once daily., Disp: , Rfl:     famotidine (Pepcid) 20 mg tablet, Take 1 tablet (20 mg) by mouth 2 times a day. (Patient taking differently: Take 1 tablet (20 mg) by mouth once daily.), Disp: 180 tablet, Rfl: 3    fish oil concentrate (Omega-3) 120-180 mg capsule, Take 1 capsule (1 g) by mouth once daily., Disp: , Rfl:     pantoprazole (ProtoNix) 40 mg EC tablet, Take 1 tablet (40 mg) by mouth once daily in the evening., Disp: 90  tablet, Rfl: 3    rosuvastatin (Crestor) 10 mg tablet, TAKE 1 TABLET BY MOUTH ONCE  DAILY, Disp: 90 tablet, Rfl: 0    sodium chloride (Ocean) 0.65 % nasal spray, Administer 2 sprays into each nostril 5 times a day., Disp: 30 mL, Rfl: 12    vit A/vit C/vit E/zinc/copper (PRESERVISION AREDS ORAL), Take 1 tablet by mouth 2 times a day., Disp: , Rfl:

## 2025-06-10 ENCOUNTER — APPOINTMENT (OUTPATIENT)
Dept: OTOLARYNGOLOGY | Facility: CLINIC | Age: 85
End: 2025-06-10
Payer: MEDICARE

## 2025-06-10 VITALS — BODY MASS INDEX: 24.75 KG/M2 | WEIGHT: 175 LBS

## 2025-06-10 DIAGNOSIS — J34.2 DEVIATED NASAL SEPTUM: ICD-10-CM

## 2025-06-10 DIAGNOSIS — R09.81 NASAL CONGESTION: ICD-10-CM

## 2025-06-10 DIAGNOSIS — R09.82 POST-NASAL DRAINAGE: Primary | ICD-10-CM

## 2025-06-10 PROCEDURE — 31231 NASAL ENDOSCOPY DX: CPT | Performed by: OTOLARYNGOLOGY

## 2025-06-10 PROCEDURE — 1160F RVW MEDS BY RX/DR IN RCRD: CPT | Performed by: OTOLARYNGOLOGY

## 2025-06-10 PROCEDURE — 1159F MED LIST DOCD IN RCRD: CPT | Performed by: OTOLARYNGOLOGY

## 2025-06-10 PROCEDURE — 99213 OFFICE O/P EST LOW 20 MIN: CPT | Performed by: OTOLARYNGOLOGY

## 2025-06-10 ASSESSMENT — PATIENT HEALTH QUESTIONNAIRE - PHQ9
SUM OF ALL RESPONSES TO PHQ9 QUESTIONS 1 AND 2: 0
2. FEELING DOWN, DEPRESSED OR HOPELESS: NOT AT ALL
1. LITTLE INTEREST OR PLEASURE IN DOING THINGS: NOT AT ALL

## 2025-06-11 DIAGNOSIS — D35.2 PITUITARY MACROADENOMA (MULTI): Primary | ICD-10-CM

## 2025-06-21 LAB
ACTH PLAS-MCNC: 15 PG/ML (ref 6–50)
CORTIS AM PEAK SERPL-MCNC: 17.4 MCG/DL
DHEA-S SERPL-MCNC: 7 MCG/DL (ref 3–225)
FSH SERPL-ACNC: 6.4 MIU/ML (ref 1.4–12.8)
IGF-I SERPL-MCNC: NORMAL NG/ML
IGF-I Z-SCORE SERPL: NORMAL
IGF-I Z-SCORE SERPL: NORMAL
LH SERPL-ACNC: 2.2 MIU/ML (ref 1.6–15.2)
PROLACTIN SERPL-MCNC: 11.2 NG/ML (ref 2–18)
QUEST CORTISOL-BINDING GLOBULIN: NORMAL
SHBG SERPL-SCNC: 74 NMOL/L (ref 22–77)
T4 FREE SERPL-MCNC: 1 NG/DL (ref 0.8–1.8)
TESTOST FREE SERPL-MCNC: NORMAL PG/ML
TESTOST SERPL-MCNC: NORMAL NG/DL
TSH SERPL-ACNC: 1.3 MIU/L (ref 0.4–4.5)

## 2025-06-24 ENCOUNTER — APPOINTMENT (OUTPATIENT)
Dept: ENDOCRINOLOGY | Facility: CLINIC | Age: 85
End: 2025-06-24
Payer: MEDICARE

## 2025-06-24 VITALS
WEIGHT: 188 LBS | HEART RATE: 58 BPM | HEIGHT: 70 IN | SYSTOLIC BLOOD PRESSURE: 109 MMHG | DIASTOLIC BLOOD PRESSURE: 53 MMHG | BODY MASS INDEX: 26.92 KG/M2

## 2025-06-24 DIAGNOSIS — E23.0 HYPOPITUITARISM (MULTI): ICD-10-CM

## 2025-06-24 DIAGNOSIS — Z79.890 LONG-TERM CURRENT USE OF TESTOSTERONE REPLACEMENT THERAPY: ICD-10-CM

## 2025-06-24 DIAGNOSIS — R79.89 ABNORMALITY OF TESTOSTERONE: ICD-10-CM

## 2025-06-24 DIAGNOSIS — D35.2 PITUITARY MACROADENOMA (MULTI): ICD-10-CM

## 2025-06-24 DIAGNOSIS — E23.0 CENTRAL HYPOGONADISM (MULTI): ICD-10-CM

## 2025-06-24 PROCEDURE — 99215 OFFICE O/P EST HI 40 MIN: CPT | Performed by: INTERNAL MEDICINE

## 2025-06-24 PROCEDURE — 1159F MED LIST DOCD IN RCRD: CPT | Performed by: INTERNAL MEDICINE

## 2025-06-24 RX ORDER — DENOSUMAB 60 MG/ML
60 INJECTION SUBCUTANEOUS
COMMUNITY

## 2025-06-24 RX ORDER — TESTOSTERONE GEL, 1% 10 MG/G
GEL TRANSDERMAL
Qty: 90 PACKET | Refills: 0 | Status: SHIPPED | OUTPATIENT
Start: 2025-06-24

## 2025-06-24 ASSESSMENT — PATIENT HEALTH QUESTIONNAIRE - PHQ9
1. LITTLE INTEREST OR PLEASURE IN DOING THINGS: NOT AT ALL
SUM OF ALL RESPONSES TO PHQ9 QUESTIONS 1 & 2: 0
2. FEELING DOWN, DEPRESSED OR HOPELESS: NOT AT ALL

## 2025-06-24 NOTE — PROGRESS NOTES
Chief Complaint : Follow up on pituitary tumor     Mr. 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  To mention that Mr Pugh has passed drivers license June 2023.  Seen here in Oct after he was diagnosed to have a large pituitary tumor. Had surgery and is here post op 12/9/2024,with Dr. Bermudez and Dr. Orona ; went well without complications. Discharged home without any replacement therapy.  Pathology showing Gonadotropic tumor, B-FSH+, GATA3 + SF1 +, Ki 67 0.8%.      Last clinic visit 12/2024.  Interval hx:  Mr Pugh is here today with his wife.  Using the cane , occasionally the Rolator.  Feels well since discharge; no sx to suggest hypothyroidism or adrenal insufficiency. No hx to suggest DI; no headaches; improved vision-remains with decreased left upper visual filed defects, still driving. Good energy.   Works with  at the gym once weekly.  Sleep is good, denies nighttime muscle cramps, no N-V or changes in bowel mvts.  Denies any recent falls or fractures.   Not sexually active since dx with bladder cancer.  Off of Aspirin since PUD several years ago.  Denies any personal or family hx of prostate cancer.    MRI 03/10/25 with significant debulking in lesion with nodularity / fluid collections that could be post surgical vs  "remaining lesion.   Seen by Neuro-ophthalmo (Dr. Ariana Bonilla), OU - Both Eyes Vision stable, vf improved in 3/2025 + ENT (Dr. Orona)  in 6/2025 , case discussed in tumor board, recs:  MRI sella w/wo and Dotate PET in 6 months.       RX Allergies[1]     Medications Ordered Prior to Encounter[2]       Visit Vitals  /53 (BP Location: Left arm, Patient Position: Sitting, BP Cuff Size: Adult)   Pulse 58   Ht 1.778 m (5' 10\")   Wt 85.3 kg (188 lb)   BMI 26.98 kg/m²   Smoking Status Never   BSA 2.05 m²      PE  Constitutional: NAD, Aox3, using a rolator, very pleasant  Skin/Hair: Warm, dry skin.  HEENT: EOMI, Anicteric scleras, No lid lag or lid retraction, No Chemosis, No TTP of ocular globes. Dry oral mucosa. -Chvostek sign   Pale left funduscopic exam/?  Letf + right upper bitemporal visual field defect/  Neck: Soft, supple. Non tender, full ROM, no lymphadenopathy. Thyroid gland palpation: non nodular, soft consistency, non tender.   Cardiovascular: Normal s1s2, RRR  Respiratory:no increased work of bleeding  Abdomen: +BS, Soft, non-tender to palpation.  Extremities: Preserved peripheral pulses, No tremors  Neuro: Moving all extremities spontaneously. CN's grossly intact. no focal weaknss.   DTRs: no delay in relaxation phase.  Skin: dry       Labs:   Latest Reference Range & Units 06/18/25 11:03   FSH 1.4 - 12.8 mIU/mL 6.4   T4, FREE 0.8 - 1.8 ng/dL 1.0   LH 1.6 - 15.2 mIU/mL 2.2   PROLACTIN 2.0 - 18.0 ng/mL 11.2   TSH 0.40 - 4.50 mIU/L 1.30   DHEA SULFATE 3 - 225 mcg/dL 7   SEX HORMONE BINDING GLOBULIN 22 - 77 nmol/L 74   CORTICOSTEROID BINDING GLOBULIN (CBG)  COMMENT ONLY (P)   CORTISOL, A.M. mcg/dL 17.4   TESTOSTERONE, TOTAL, MS  COMMENT ONLY (P)   TESTOSTERONE, FREE  COMMENT ONLY (P)   ACTH, PLASMA 6 - 50 pg/mL 15   IGF 1, LC/MS 34 - 246 ng/mL 27 (L)   Z SCORE (MALE) -2.0 - 2.0 SD -2.3 (L)   (L): Data is abnormally low  (P): Preliminary      Pathology:  Gonadotroph tumor: Pituitary, transsphenoidal " resection specimen     Electronically signed by Victoria JEAN Asa, MD PhD on 12/20/2024 at 1623 EST        By the signature on this report, the individual or group listed as making the Final Interpretation/Diagnosis certifies that they have reviewed this case.    Comment    See synoptic report     SYNOPTIC REPORT: TUMORS OF THE PITUITARY GLAND     Procedure                                   Transsphenoidal resection  Clinical Features                        Clinically nonfunctioning   Tumor Size (from imaging)       Greatest dimension:3.42 cm  *Additional dimensions: .4 x 2.4 cm  *Received:                                  In formalin  *Specimen Integrity                   Fragmented  Specimen Size                           2.7 x 1.4 x 0.3 cm  Histologic Features  Reticulin                                       Disrupted  PAS                                             Negative  Infiltrating tumor                           cannot be determined      Immunohistochemistry   TPIT                                             Negative  PIT1                                             Negative                                        ER                                               Negative  GATA3                                         Positive   SF1                                              Positive   ACTH                                           Negative  GH                                               Negative  PRL                                             Negative  Beta-TSH                                     Negative  Beta-FSH                                     Positive   Beta-LH                                       Negative  Keratin (CAM 5.2)                        Negative  Ki67 LI                                         0.8%       Tumor Type  Pituitary Neuroendocrine Tumor               Subtype                          Gonadotroph tumor  *Additional Pathologic Findings  Nontumorous adenohypophysis:  Not  "identified  Neurohypophysis                         Not identified   Clinical History    Pituitary adenoma (Multi) [D35.2]   Gross Description    A:  Received in formalin, labeled with the patient's name and hospital number and \"pituitary tumor\", are multiple fragments of tan-pink soft tissue aggregating to 2.7 x 1.4 x 0.3 cm. The specimen is entirely submitted in 2 cassettes.   ANUJ             Assessment/Plan  Mr. 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      Doing well 6 months post op. No evidence of any central hypothyroidism , central AI or DI.  Pathology showing Gonadotropic tumor, B-FSH+, GATA3 + SF1 +, Ki 67 0.8%.  Has central hypogonadism, in the background of osteoporosis on Prolia.    -start Testosterone gel 1.62 % 1 packet daily (AE explained, potential health benefits including but not limited to bone health improvement, sexual performance ..discxussed with the patient)  -Labs prior to next bhavesh + MRI sella  -RTC in 3 months  -Will get CSA for Testosterone during next visit    Carolin Lorenzo MD  PGY-V, Endocrinology fellow  Patient seen , examined and case discussed with Dr. Denise       [1] No Known Allergies  [2]   Current Outpatient Medications on File Prior to Visit   Medication Sig Dispense Refill    amoxicillin (Amoxil) 500 mg capsule       calcium carbonate (Oscal) 500 mg calcium (1,250 mg) tablet Take 1 tablet by mouth once daily.      cholecalciferol (Vitamin D-3) 25 MCG " (1000 UT) tablet Take 1 tablet (1,000 Units) by mouth once daily.      denosumab (Prolia) 60 mg/mL syringe Inject 1 mL (60 mg total) under the skin every 6 months.      famotidine (Pepcid) 20 mg tablet Take 1 tablet (20 mg) by mouth 2 times a day. 180 tablet 3    fish oil concentrate (Omega-3) 120-180 mg capsule Take 1 capsule (1 g) by mouth once daily.      pantoprazole (ProtoNix) 40 mg EC tablet Take 1 tablet (40 mg) by mouth once daily in the evening. 90 tablet 3    rosuvastatin (Crestor) 10 mg tablet TAKE 1 TABLET BY MOUTH ONCE  DAILY 90 tablet 0    vit A/vit C/vit E/zinc/copper (PRESERVISION AREDS ORAL) Take 1 tablet by mouth 2 times a day.      acetaminophen (Tylenol Extra Strength) 500 mg tablet Take 1 tablet (500 mg) by mouth every 8 hours if needed for mild pain (1 - 3). (Patient not taking: Reported on 6/24/2025)      calcium carbonate-vitamin D3 600 mg-5 mcg (200 unit) tablet Take 1 tablet by mouth twice a day. (Patient not taking: Reported on 6/24/2025)      [DISCONTINUED] sodium chloride (Ocean) 0.65 % nasal spray Administer 2 sprays into each nostril 5 times a day. 30 mL 12     No current facility-administered medications on file prior to visit.

## 2025-06-26 DIAGNOSIS — K21.9 GASTROESOPHAGEAL REFLUX DISEASE, UNSPECIFIED WHETHER ESOPHAGITIS PRESENT: ICD-10-CM

## 2025-06-26 RX ORDER — PANTOPRAZOLE SODIUM 40 MG/1
40 TABLET, DELAYED RELEASE ORAL 2 TIMES DAILY
Qty: 180 TABLET | Refills: 3 | Status: SHIPPED | OUTPATIENT
Start: 2025-06-26

## 2025-06-27 ENCOUNTER — DOCUMENTATION (OUTPATIENT)
Dept: ENDOCRINOLOGY | Facility: HOSPITAL | Age: 85
End: 2025-06-27
Payer: MEDICARE

## 2025-06-27 LAB
ACTH PLAS-MCNC: 15 PG/ML (ref 6–50)
CORTIS AM PEAK SERPL-MCNC: 17.4 MCG/DL
DHEA-S SERPL-MCNC: 7 MCG/DL (ref 3–225)
FSH SERPL-ACNC: 6.4 MIU/ML (ref 1.4–12.8)
IGF-I SERPL-MCNC: 27 NG/ML (ref 34–246)
IGF-I Z-SCORE SERPL: -2.3 SD
LH SERPL-ACNC: 2.2 MIU/ML (ref 1.6–15.2)
PROLACTIN SERPL-MCNC: 11.2 NG/ML (ref 2–18)
QUEST CORTISOL-BINDING GLOBULIN: 3.2 MG/DL (ref 1.9–4.5)
SHBG SERPL-SCNC: 74 NMOL/L (ref 22–77)
T4 FREE SERPL-MCNC: 1 NG/DL (ref 0.8–1.8)
TESTOST FREE SERPL-MCNC: 5.3 PG/ML (ref 30–135)
TESTOST SERPL-MCNC: 75 NG/DL (ref 250–1100)
TSH SERPL-ACNC: 1.3 MIU/L (ref 0.4–4.5)

## 2025-06-27 NOTE — PROGRESS NOTES
Received a msg that [facility] doesn't currently have Testosterone gel , and they can not know until when this will be the case.  I called Mr Pugh today, he mentioned he doesn't want the med to be sent to any other pharmacy, as he usually doesn't have to pay for med coming from [facility].   He understands how this can affect the treatment plan , also that he will likely has to reschedule his bhavesh (currently hs an bhavesh in Oct).    Dr Denise and Mrs York updated.

## 2025-06-30 DIAGNOSIS — E78.5 HYPERLIPIDEMIA, UNSPECIFIED HYPERLIPIDEMIA TYPE: ICD-10-CM

## 2025-06-30 RX ORDER — ROSUVASTATIN CALCIUM 10 MG/1
10 TABLET, COATED ORAL DAILY
Qty: 90 TABLET | Refills: 3 | Status: SHIPPED | OUTPATIENT
Start: 2025-06-30

## 2025-07-02 DIAGNOSIS — Z00.00 HEALTHCARE MAINTENANCE: ICD-10-CM

## 2025-07-09 ENCOUNTER — LAB (OUTPATIENT)
Dept: LAB | Facility: HOSPITAL | Age: 85
End: 2025-07-09
Payer: MEDICARE

## 2025-07-09 DIAGNOSIS — M81.0 OSTEOPOROSIS, UNSPECIFIED OSTEOPOROSIS TYPE, UNSPECIFIED PATHOLOGICAL FRACTURE PRESENCE: ICD-10-CM

## 2025-07-09 DIAGNOSIS — M81.0 AGE-RELATED OSTEOPOROSIS WITHOUT CURRENT PATHOLOGICAL FRACTURE: Primary | ICD-10-CM

## 2025-07-09 DIAGNOSIS — Z00.00 ENCOUNTER FOR GENERAL ADULT MEDICAL EXAMINATION WITHOUT ABNORMAL FINDINGS: ICD-10-CM

## 2025-07-09 LAB
25(OH)D3 SERPL-MCNC: 56 NG/ML (ref 30–100)
ALBUMIN SERPL BCP-MCNC: 4.5 G/DL (ref 3.4–5)
ALBUMIN SERPL BCP-MCNC: 4.6 G/DL (ref 3.4–5)
ALP SERPL-CCNC: 76 U/L (ref 33–136)
ALP SERPL-CCNC: 77 U/L (ref 33–136)
ALT SERPL W P-5'-P-CCNC: 29 U/L (ref 10–52)
ALT SERPL W P-5'-P-CCNC: 29 U/L (ref 10–52)
ANION GAP SERPL CALC-SCNC: 12 MMOL/L (ref 10–20)
ANION GAP SERPL CALC-SCNC: 13 MMOL/L (ref 10–20)
AST SERPL W P-5'-P-CCNC: 47 U/L (ref 9–39)
AST SERPL W P-5'-P-CCNC: 48 U/L (ref 9–39)
BASOPHILS # BLD AUTO: 0.03 X10*3/UL (ref 0–0.1)
BASOPHILS NFR BLD AUTO: 0.4 %
BILIRUB SERPL-MCNC: 0.9 MG/DL (ref 0–1.2)
BILIRUB SERPL-MCNC: 0.9 MG/DL (ref 0–1.2)
BUN SERPL-MCNC: 22 MG/DL (ref 6–23)
BUN SERPL-MCNC: 22 MG/DL (ref 6–23)
CALCIUM SERPL-MCNC: 9.4 MG/DL (ref 8.6–10.6)
CALCIUM SERPL-MCNC: 9.6 MG/DL (ref 8.6–10.6)
CHLORIDE SERPL-SCNC: 97 MMOL/L (ref 98–107)
CHLORIDE SERPL-SCNC: 97 MMOL/L (ref 98–107)
CHOLEST SERPL-MCNC: 228 MG/DL (ref 0–199)
CHOLESTEROL/HDL RATIO: 3.4
CO2 SERPL-SCNC: 28 MMOL/L (ref 21–32)
CO2 SERPL-SCNC: 28 MMOL/L (ref 21–32)
CREAT SERPL-MCNC: 1.06 MG/DL (ref 0.5–1.3)
CREAT SERPL-MCNC: 1.13 MG/DL (ref 0.5–1.3)
CRP SERPL HS-MCNC: 4.7 MG/L
EGFRCR SERPLBLD CKD-EPI 2021: 64 ML/MIN/1.73M*2
EGFRCR SERPLBLD CKD-EPI 2021: 69 ML/MIN/1.73M*2
EOSINOPHIL # BLD AUTO: 0.08 X10*3/UL (ref 0–0.4)
EOSINOPHIL NFR BLD AUTO: 1.1 %
ERYTHROCYTE [DISTWIDTH] IN BLOOD BY AUTOMATED COUNT: 14.7 % (ref 11.5–14.5)
EST. AVERAGE GLUCOSE BLD GHB EST-MCNC: 100 MG/DL
GLUCOSE SERPL-MCNC: 88 MG/DL (ref 74–99)
GLUCOSE SERPL-MCNC: 88 MG/DL (ref 74–99)
HBA1C MFR BLD: 5.1 % (ref ?–5.7)
HCT VFR BLD AUTO: 43.5 % (ref 41–52)
HDLC SERPL-MCNC: 66.5 MG/DL
HGB BLD-MCNC: 13.4 G/DL (ref 13.5–17.5)
IMM GRANULOCYTES # BLD AUTO: 0.04 X10*3/UL (ref 0–0.5)
IMM GRANULOCYTES NFR BLD AUTO: 0.6 % (ref 0–0.9)
LDLC SERPL CALC-MCNC: 139 MG/DL
LYMPHOCYTES # BLD AUTO: 1.87 X10*3/UL (ref 0.8–3)
LYMPHOCYTES NFR BLD AUTO: 25.9 %
MCH RBC QN AUTO: 28.8 PG (ref 26–34)
MCHC RBC AUTO-ENTMCNC: 30.8 G/DL (ref 32–36)
MCV RBC AUTO: 93 FL (ref 80–100)
MONOCYTES # BLD AUTO: 0.41 X10*3/UL (ref 0.05–0.8)
MONOCYTES NFR BLD AUTO: 5.7 %
NEUTROPHILS # BLD AUTO: 4.8 X10*3/UL (ref 1.6–5.5)
NEUTROPHILS NFR BLD AUTO: 66.3 %
NON HDL CHOLESTEROL: 162 MG/DL (ref 0–149)
NRBC BLD-RTO: 0 /100 WBCS (ref 0–0)
PLATELET # BLD AUTO: 238 X10*3/UL (ref 150–450)
POTASSIUM SERPL-SCNC: 4.1 MMOL/L (ref 3.5–5.3)
POTASSIUM SERPL-SCNC: 4.3 MMOL/L (ref 3.5–5.3)
PROT SERPL-MCNC: 7.3 G/DL (ref 6.4–8.2)
PROT SERPL-MCNC: 7.4 G/DL (ref 6.4–8.2)
PSA SERPL-MCNC: 0.22 NG/ML
RBC # BLD AUTO: 4.66 X10*6/UL (ref 4.5–5.9)
SODIUM SERPL-SCNC: 133 MMOL/L (ref 136–145)
SODIUM SERPL-SCNC: 134 MMOL/L (ref 136–145)
TRIGL SERPL-MCNC: 113 MG/DL (ref 0–149)
TSH SERPL-ACNC: 1.27 MIU/L (ref 0.44–3.98)
VLDL: 23 MG/DL (ref 0–40)
WBC # BLD AUTO: 7.2 X10*3/UL (ref 4.4–11.3)

## 2025-07-09 PROCEDURE — 85025 COMPLETE CBC W/AUTO DIFF WBC: CPT

## 2025-07-09 PROCEDURE — 36415 COLL VENOUS BLD VENIPUNCTURE: CPT

## 2025-07-09 PROCEDURE — 80053 COMPREHEN METABOLIC PANEL: CPT

## 2025-07-09 PROCEDURE — 83036 HEMOGLOBIN GLYCOSYLATED A1C: CPT

## 2025-07-09 PROCEDURE — 80061 LIPID PANEL: CPT

## 2025-07-09 PROCEDURE — 82306 VITAMIN D 25 HYDROXY: CPT

## 2025-07-09 PROCEDURE — 86141 C-REACTIVE PROTEIN HS: CPT

## 2025-07-09 PROCEDURE — 84153 ASSAY OF PSA TOTAL: CPT

## 2025-07-09 PROCEDURE — 84443 ASSAY THYROID STIM HORMONE: CPT

## 2025-07-10 LAB
HOLD SPECIMEN: NORMAL

## 2025-07-12 LAB — BACTERIA UR CULT: NORMAL

## 2025-07-16 DIAGNOSIS — E23.0 HYPOPITUITARISM (MULTI): ICD-10-CM

## 2025-07-16 RX ORDER — TESTOSTERONE GEL, 1% 10 MG/G
GEL TRANSDERMAL
Qty: 90 PACKET | Refills: 0 | Status: SHIPPED | OUTPATIENT
Start: 2025-07-16

## 2025-07-17 ENCOUNTER — APPOINTMENT (OUTPATIENT)
Dept: INFUSION THERAPY | Facility: CLINIC | Age: 85
End: 2025-07-17
Payer: MEDICARE

## 2025-07-17 VITALS
TEMPERATURE: 97.1 F | DIASTOLIC BLOOD PRESSURE: 66 MMHG | HEART RATE: 54 BPM | SYSTOLIC BLOOD PRESSURE: 101 MMHG | RESPIRATION RATE: 16 BRPM | OXYGEN SATURATION: 94 %

## 2025-07-17 DIAGNOSIS — M81.0 OSTEOPOROSIS, UNSPECIFIED OSTEOPOROSIS TYPE, UNSPECIFIED PATHOLOGICAL FRACTURE PRESENCE: ICD-10-CM

## 2025-07-17 PROCEDURE — 96372 THER/PROPH/DIAG INJ SC/IM: CPT | Performed by: NURSE PRACTITIONER

## 2025-07-17 RX ORDER — ALBUTEROL SULFATE 0.83 MG/ML
3 SOLUTION RESPIRATORY (INHALATION) AS NEEDED
OUTPATIENT
Start: 2026-01-05

## 2025-07-17 RX ORDER — DIPHENHYDRAMINE HYDROCHLORIDE 50 MG/ML
50 INJECTION, SOLUTION INTRAMUSCULAR; INTRAVENOUS AS NEEDED
OUTPATIENT
Start: 2026-01-05

## 2025-07-17 RX ORDER — EPINEPHRINE 0.3 MG/.3ML
0.3 INJECTION SUBCUTANEOUS EVERY 5 MIN PRN
OUTPATIENT
Start: 2026-01-05

## 2025-07-17 RX ORDER — FAMOTIDINE 10 MG/ML
20 INJECTION, SOLUTION INTRAVENOUS ONCE AS NEEDED
OUTPATIENT
Start: 2026-01-05

## 2025-07-17 ASSESSMENT — ENCOUNTER SYMPTOMS
WHEEZING: 0
LEG SWELLING: 0
NUMBNESS: 0
COUGH: 0
SHORTNESS OF BREATH: 0
LIGHT-HEADEDNESS: 0
WOUND: 0
PALPITATIONS: 0
EXTREMITY WEAKNESS: 0
DIZZINESS: 0

## 2025-07-17 NOTE — PROGRESS NOTES
[facility]   Infusion Clinic Note   Date: 2025   Name: Dakota Pugh  : 1940   MRN: [id number]         Reason for Visit: Injections and Injection Follow-up (Every 6 months Prolia 60mg subcutaneous injection)         Today: We administered denosumab.       Ordered By: Cristi Verma DO       For a Diagnosis of: Osteoporosis, unspecified osteoporosis type, unspecified pathological fracture presence       At today's visit patient accompanied by: Wife      Today's Vitals:   Vitals:    25 1300   BP: 101/66   Pulse: 54   Resp: 16   Temp: 36.2 °C (97.1 °F)   TempSrc: Temporal   SpO2: 94%             Pre - Treatment Checklist:      - Previous reaction to current treatment: no      (Assess patient for the concerns below. Document provider notification as appropriate).  - Active or recent infection with/without current antibiotic use: no  - Recent or planned invasive dental work: no  - Recent or planned surgeries: no  - Recently received or plans to receive vaccinations: no  - Has treatment related toxicities: no  - Any chance may be pregnant:  n/a      Pain: 0   - Is the pain different from normal: n/a   - Is prescribing Doctor aware:  n/a      Labs: Reviewed       Fall Risk Screening: Jurado Fall Risk  History of Falling, Immediate or Within 3 Months: No  Secondary Diagnosis: Yes  Ambulatory Aid: Crutches/cane/walker (Wheeled walker. Cane while in the house.)  Intravenous Therapy/Heparin Lock: No  Gait/Transferring: Weak  Mental Status: Oriented to own ability  Jurado Fall Risk Score: 40            Review Of Systems:  Review of Systems   Respiratory:  Negative for cough, shortness of breath and wheezing.    Cardiovascular:  Negative for chest pain, leg swelling and palpitations.   Skin:  Negative for itching, rash and wound.   Neurological:  Negative for dizziness, extremity weakness, light-headedness and numbness.         Infusion Readiness:  - Assessment Concerns Related to  Infusion: No  - Provider notified: n/a      New Patient Education:    N/A (returning patient for continuation of therapy. Ongoing education provided as needed.)        Treatment Conditions & Drug Specific Questions:    Denosumab  (PROLIA. XGEVA. STOBOCLO)    (Unless otherwise specified on patient specific therapy plan):     TREATMENT CONDITIONS:  Unless otherwise specified on patient specific therapy plan HOLD and notify provider prior to proceeding with today's injection if patients:  O Corrected or Serum Calcium LESS THAN 8.6 mg/dL  OR Ionized calcium less than 1.1 mmol/L or  less than 4.7 mg/dL (depending on resulting agency)  o Recent or planned invasive dental procedure (within 4 weeks)    Lab Results   Component Value Date    CALCIUM 9.4 07/09/2025    PHOS 3.0 12/18/2024      Patient meets treatment conditions? Yes    DRUG SPECIFIC QUESTIONS:  Is the patient taking calcium and vitamin D? Yes  (Recommended)    Pt Instructed on following risks: (1) hypocalcemia, (2) osteonecrosis of the jaw, (3) atypical femoral fractures, (4) serious infections, and (5) dermatologic reactions?  Yes      REMINDER:  PREGNANCY CATEGORY X DRUG. OBTAIN NEGTATIVE PREGNANCY TEST PRIOR TO FIRST INFUSION FOR WOMEN OF CHILDBEARING ABILITY   REMS DRUG    Recommended Vitals/Observation:  Vitals: Obtain vitals prior to injection.  Observation: Patient may leave immediately following injection.        Weight Based Drug Calculations:    WEIGHT BASED DRUGS: NOT APPLICABLE / FLAT DOSE       Post Treatment: Patient tolerated treatment without issue and was discharged in no apparent distress.      Note Authored / Patient Cared for By: Raquel Hartley LPN

## 2025-07-17 NOTE — PATIENT INSTRUCTIONS
Today :We administered denosumab. Prolia 60mg subcutaneous injection left upper arm  Blood Calcium within 28 days of next Prolia appointment    For:   1. Osteoporosis, unspecified osteoporosis type, unspecified pathological fracture presence       Your next appointment is due in:  6 months        Please read the  Medication Guide that was given to you and reviewed during todays visit.     (Tell all doctors including dentists that you are taking this medication)     Go to the emergency room or call 911 if:  -You have signs of allergic reaction:   -Rash, hives, itching.   -Swollen, blistered, peeling skin.   -Swelling of face, lips, mouth, tongue or throat.   -Tightness of chest, trouble breathing, swallowing or talking     Call your doctor:  - If injection site gets red, warm, swollen, itchy or leaks fluid or pus.     (Leave band aid on your injection site for at least 2 hours and keep area clean and dry)  - If you get sick or have symptoms of infection or are not feeling well for any reason.    (Wash your hands often, stay away from people who are sick)  - If you have side effects from your medication that do not go away or are bothersome.     (Refer to the teaching your nurse gave you for side effects to call your doctor about)    - Common side effects may include:  stuffy nose, headache, feeling tired, muscle aches, upset stomach  - Before receiving any vaccines     - Call the [facility] at   If:  - You get sick, are on antibiotics, have had a recent vaccine, have surgery or dental work and your doctor wants your visit rescheduled.  - You need to cancel and reschedule your visit for any reason. Call at least 2 days before your visit if you need to cancel.   - Your insurance changes before your next visit.    (We will need to get approval from your new insurance. This can take up to two weeks.)     The [facility] is opened Monday thru Friday. We are closed on weekends and  holidays.   Voice mail will take your call if the center is closed. If you leave a message please allow 24 hours for a call back during weekdays. If you leave a message on a weekend/holiday, we will call you back the next business day.    A pharmacist is available Monday - Friday from 8:30AM to 3:30PM to help answer any questions you may have about your prescriptions(s). Please call pharmacy at:    [facility]: [phone]  [facility]: [phone]  [facility]: [phone]

## 2025-07-22 ENCOUNTER — OFFICE VISIT (OUTPATIENT)
Dept: PRIMARY CARE | Facility: CLINIC | Age: 85
End: 2025-07-22
Payer: MEDICARE

## 2025-07-22 ENCOUNTER — APPOINTMENT (OUTPATIENT)
Dept: PRIMARY CARE | Facility: CLINIC | Age: 85
End: 2025-07-22
Payer: MEDICARE

## 2025-07-22 VITALS
TEMPERATURE: 97.6 F | BODY MASS INDEX: 26.63 KG/M2 | DIASTOLIC BLOOD PRESSURE: 60 MMHG | WEIGHT: 186 LBS | SYSTOLIC BLOOD PRESSURE: 99 MMHG | OXYGEN SATURATION: 96 % | HEIGHT: 70 IN | HEART RATE: 53 BPM

## 2025-07-22 VITALS
DIASTOLIC BLOOD PRESSURE: 60 MMHG | OXYGEN SATURATION: 96 % | HEART RATE: 53 BPM | HEIGHT: 70 IN | BODY MASS INDEX: 26.63 KG/M2 | SYSTOLIC BLOOD PRESSURE: 99 MMHG | TEMPERATURE: 97.6 F | WEIGHT: 186 LBS

## 2025-07-22 DIAGNOSIS — Z00.01 ENCOUNTER FOR GENERAL ADULT MEDICAL EXAMINATION WITH ABNORMAL FINDINGS: Primary | ICD-10-CM

## 2025-07-22 DIAGNOSIS — M81.0 OSTEOPOROSIS, UNSPECIFIED OSTEOPOROSIS TYPE, UNSPECIFIED PATHOLOGICAL FRACTURE PRESENCE: ICD-10-CM

## 2025-07-22 DIAGNOSIS — K21.9 GASTROESOPHAGEAL REFLUX DISEASE, UNSPECIFIED WHETHER ESOPHAGITIS PRESENT: ICD-10-CM

## 2025-07-22 DIAGNOSIS — Z15.09 BRCA2 POSITIVE: ICD-10-CM

## 2025-07-22 DIAGNOSIS — Z15.01 BRCA2 POSITIVE: ICD-10-CM

## 2025-07-22 DIAGNOSIS — E78.01 ESSENTIAL FAMILIAL HYPERCHOLESTEROLEMIA: ICD-10-CM

## 2025-07-22 PROCEDURE — UHSPHYS PR UH SELECT PHYSICAL: Performed by: FAMILY MEDICINE

## 2025-07-22 PROCEDURE — G0439 PPPS, SUBSEQ VISIT: HCPCS | Performed by: FAMILY MEDICINE

## 2025-07-22 PROCEDURE — 1126F AMNT PAIN NOTED NONE PRSNT: CPT | Performed by: FAMILY MEDICINE

## 2025-07-22 PROCEDURE — 1159F MED LIST DOCD IN RCRD: CPT | Performed by: FAMILY MEDICINE

## 2025-07-22 ASSESSMENT — PATIENT HEALTH QUESTIONNAIRE - PHQ9
SUM OF ALL RESPONSES TO PHQ QUESTIONS 1-9: 0
7. TROUBLE CONCENTRATING ON THINGS, SUCH AS READING THE NEWSPAPER OR WATCHING TELEVISION: NOT AT ALL
SUM OF ALL RESPONSES TO PHQ9 QUESTIONS 1 & 2: 0
1. LITTLE INTEREST OR PLEASURE IN DOING THINGS: NOT AT ALL
3. TROUBLE FALLING OR STAYING ASLEEP: NOT AT ALL
6. FEELING BAD ABOUT YOURSELF - OR THAT YOU ARE A FAILURE OR HAVE LET YOURSELF OR YOUR FAMILY DOWN: NOT AT ALL
4. FEELING TIRED OR HAVING LITTLE ENERGY: NOT AT ALL
2. FEELING DOWN, DEPRESSED OR HOPELESS: NOT AT ALL
5. POOR APPETITE OR OVEREATING: NOT AT ALL
9. THOUGHTS THAT YOU WOULD BE BETTER OFF DEAD, OR OF HURTING YOURSELF: NOT AT ALL
8. MOVING OR SPEAKING SO SLOWLY THAT OTHER PEOPLE COULD HAVE NOTICED. OR THE OPPOSITE, BEING SO FIGETY OR RESTLESS THAT YOU HAVE BEEN MOVING AROUND A LOT MORE THAN USUAL: NOT AT ALL

## 2025-07-22 ASSESSMENT — LIFESTYLE VARIABLES
SKIP TO QUESTIONS 9-10: 1
AUDIT-C TOTAL SCORE: 2
HOW MANY STANDARD DRINKS CONTAINING ALCOHOL DO YOU HAVE ON A TYPICAL DAY: 1 OR 2
HOW OFTEN DO YOU HAVE A DRINK CONTAINING ALCOHOL: 2-4 TIMES A MONTH
HOW OFTEN DO YOU HAVE SIX OR MORE DRINKS ON ONE OCCASION: NEVER

## 2025-07-22 ASSESSMENT — PAIN SCALES - GENERAL
PAINLEVEL_OUTOF10: 0-NO PAIN
PAINLEVEL_OUTOF10: 0-NO PAIN

## 2025-07-22 ASSESSMENT — ENCOUNTER SYMPTOMS
LOSS OF SENSATION IN FEET: 0
OCCASIONAL FEELINGS OF UNSTEADINESS: 0
DEPRESSION: 0

## 2025-07-22 NOTE — PROGRESS NOTES
"Dakota Pugh is a 85 y.o. year old here for a Medicare Annual Wellness Visit     Health Risk Assessment  In general, health is:  \"very good\" '24 and '25      Concerns with balance:  always aware of it - \"I'm very risk averse\"     Concerns with teeth or dentures:  not currently, goes 2x/year      Concerns with sexual function:  not noted      Felt anxious, stressed, angry, irritable, lonely, isolated, or had thoughts of hurting themself: can get irritable, can sometimes be a bit much for his wife      Has little interest or pleasure in doing things:  enjoys cooking (planning, cooking, eating)     Bothered by feeling down, depressed, or hopeless:  feels helpless to not be able to help his wife      Needs help with grocery shopping, cooking, housework, bathing, grooming, dressing, eating, sitting or standing, walking, using the toilet, handling finances, taking medications, using the telephone, or driving:  yes     Following safety precautions in the home environment and vehicle: removed throw rugs from floors, installed grab bars in the bathroom, handrails in stairwells, having adequate lighting, wearing seatbelt at all times?:  yes     Smokes cigarettes, vapes, or chew tobacco: 1/10th pack for maybe a couple of years     Eats healthy foods including fruits, vegetables, whole grains, and fiber-rich foods:  \"I love summer fruit\"      Number of days per week engages in exercise:  1 day a week goes to [employer] () - 45-60 min - doing more running around than I used to do - doing more steps     Average alcohol consumption: \"I like to drink alcohol\" - maybe only 1 drink at a sitting on avg if that       Current Providers  Specialists: I have reviewed specialist-related care of the patient in the medical record.  [facility] ([facility]bent(sp?))  [facility]he[facility] (Luci)   GI (Ciro)   Ophtho (Irene)   Endo (Nadeem)    Medical/Family history review  Reviewed and updated problem list, " "medical/surgical/family/social history, medications, and allergies.     Opioid use review  Patient use of opioids:  0     Depression screening  Depression Screening PHQ-2 Score   Today Basically 0        Cognitive screening  Mini Cog Score:  5/5     Cognitive screening reviewed and plan:  not indicated     Functional Observation  Was the patient's timed Up & Go test unsteady or >= 12 seconds?  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  Taking and tolerating medication well.  Doing well with limited physical activity.      4) Osteoporosis - followed by Dr Verma - getting Prolia - last BMD  Sept 2024     5) Prevention:  Immunizations - seems due for TDaP - will do seasonal vaccines   Skin CA - enc follow-up with Dr Alfonso  Other providers...  Dentistry (Broadbent(sp?))  [facility]he[facility] (Luci)   GI (Ciro)   Ophtho (Irene)   Endo (Arafat retiring - consider Dr Shoemaker)       Review of Systems  Essentially noncontributory otherwise    Objective   BP 99/60 (BP Location: Left arm, Patient Position: Sitting, BP Cuff Size: Large adult)   Pulse 53   Temp 36.4 °C (97.6 °F) (Temporal)   Ht 1.786 m (5' 10.32\")   Wt 84.4 kg (186 lb)   SpO2 96%   BMI 26.45 kg/m²     Physical Exam  Vitals and " "nursing note reviewed.   Constitutional:       General: He is not in acute distress.     Appearance: He is not ill-appearing.   HENT:      Head: Normocephalic and atraumatic.      Right Ear: Tympanic membrane normal.      Left Ear: Tympanic membrane normal.      Mouth/Throat:      Pharynx: No posterior oropharyngeal erythema.     Eyes:      General: No scleral icterus.     Extraocular Movements: Extraocular movements intact.      Pupils: Pupils are equal, round, and reactive to light.       Cardiovascular:      Rate and Rhythm: Normal rate and regular rhythm.   Pulmonary:      Breath sounds: Normal breath sounds. No wheezing or rhonchi.   Abdominal:      General: There is no distension.      Palpations: Abdomen is soft.      Tenderness: There is no abdominal tenderness.     Musculoskeletal:      Cervical back: Normal range of motion.      Right lower leg: No edema.      Left lower leg: No edema.      Comments: Ambulates with use of walker   Lymphadenopathy:      Cervical: No cervical adenopathy.     Skin:     General: Skin is warm and dry.     Neurological:      Mental Status: He is alert and oriented to person, place, and time. Mental status is at baseline.      Motor: No weakness.      Coordination: Coordination normal.     Psychiatric:         Mood and Affect: Mood normal.         Behavior: Behavior normal.         Thought Content: Thought content normal.         Judgment: Judgment normal.         Assessment/Plan   Problem List Items Addressed This Visit       Essential familial hypercholesterolemia    Osteoporosis     Other Visit Diagnoses         Encounter for general adult medical examination with abnormal findings    -  Primary      BRCA2 positive          Gastroesophageal reflux disease, unspecified whether esophagitis present        Relevant Medications    famotidine (Pepcid) 20 mg tablet    pantoprazole (ProtoNix) 40 mg EC tablet        1) BRCA2 positive - enc NRCP will schedule    2) \"stomach\" issues - " stable - cpm     3) CAD risk - stable labs and no sxs - cpm      4) Osteoporosis - followed by Dr Verma - getting Prolia - last BMD  Sept 2024     5) Prevention:  Immunizations - seems due for TDaP - will do seasonal vaccines   Skin CA - enc follow-up with Dr Alfonso  Other providers...  Dentistry (Broadbent(sp?)) - enc reg follow-up   Ophtho (Irene) - enc reg follow-up  Endo (Arafat retiring - consider Dr Shoemaker)

## 2025-07-22 NOTE — PROGRESS NOTES
"Subjective   Patient ID: Dakota Pugh is a 85 y.o. male who presents for Annual Exam ([facility]).  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  Taking and tolerating medication well.  Doing well with limited physical activity.      4) Osteoporosis - followed by Dr Verma - getting Prolia - last BMD  Sept 2024     5) Prevention:  Immunizations - seems due for TDaP - will do seasonal vaccines   Skin CA - enc follow-up with Dr Alfonso  Other providers...  Dentistry (Broadbent(sp?))  Rhe[facility] (Luci)   GI (Ciro)   Ophtho (Irene)   Endo (Arafat retiring - consider Dr Shoemaker)       Review of Systems  Essentially noncontributory otherwise    Objective   BP 99/60 (BP Location: Left arm, Patient Position: Sitting, BP Cuff Size: Large adult)   Pulse 53   Temp 36.4 °C (97.6 °F) (Temporal)   Ht 1.786 m (5' 10.32\")   Wt 84.4 kg (186 lb)   SpO2 96%   BMI 26.45 kg/m²     Physical Exam  Vitals and nursing note reviewed.   Constitutional:       General: He is not in acute distress.     Appearance: He is not ill-appearing.   HENT:      Head: Normocephalic and atraumatic.      Right Ear: Tympanic membrane normal.      Left Ear: Tympanic membrane normal.      Mouth/Throat:      Pharynx: No posterior oropharyngeal erythema.     Eyes:      General: No scleral icterus.     Extraocular Movements: Extraocular movements intact.      Pupils: Pupils are equal, round, and reactive to light.       Cardiovascular:      Rate and Rhythm: Normal rate and regular rhythm.   Pulmonary:      Breath sounds: Normal breath sounds. No wheezing " "or rhonchi.   Abdominal:      General: There is no distension.      Palpations: Abdomen is soft.      Tenderness: There is no abdominal tenderness.     Musculoskeletal:      Cervical back: Normal range of motion.      Right lower leg: No edema.      Left lower leg: No edema.      Comments: Ambulates with use of walker   Lymphadenopathy:      Cervical: No cervical adenopathy.     Skin:     General: Skin is warm and dry.     Neurological:      Mental Status: He is alert and oriented to person, place, and time. Mental status is at baseline.      Motor: No weakness.      Coordination: Coordination normal.     Psychiatric:         Mood and Affect: Mood normal.         Behavior: Behavior normal.         Thought Content: Thought content normal.         Judgment: Judgment normal.         Assessment/Plan   Problem List Items Addressed This Visit       Essential familial hypercholesterolemia    Osteoporosis     Other Visit Diagnoses         Encounter for general adult medical examination with abnormal findings    -  Primary      BRCA2 positive          Gastroesophageal reflux disease, unspecified whether esophagitis present        Relevant Medications    famotidine (Pepcid) 20 mg tablet    pantoprazole (ProtoNix) 40 mg EC tablet        1) BRCA2 positive - enc NRCP will schedule    2) \"stomach\" issues - stable - cpm     3) CAD risk - stable labs and no sxs - cpm      4) Osteoporosis - followed by Dr Verma - getting Prolia - last BMD  Sept 2024     5) Prevention:  Immunizations - seems due for TDaP - will do seasonal vaccines   Skin CA - enc follow-up with Dr Alfonso  Other providers...  Dentistry ([facility]bent(sp?)) - enc reg follow-up   Ophtho (Irene) - enc reg follow-up  Endo (Arafat retiring - consider Dr Shoemaker)       "

## 2025-07-27 RX ORDER — FAMOTIDINE 20 MG/1
20 TABLET, FILM COATED ORAL EVERY MORNING
Qty: 90 TABLET | Refills: 3 | Status: SHIPPED | OUTPATIENT
Start: 2025-07-27

## 2025-07-27 RX ORDER — PANTOPRAZOLE SODIUM 40 MG/1
40 TABLET, DELAYED RELEASE ORAL
Qty: 90 TABLET | Refills: 3 | Status: SHIPPED | OUTPATIENT
Start: 2025-07-27

## 2025-07-29 DIAGNOSIS — M81.0 AGE-RELATED OSTEOPOROSIS WITHOUT CURRENT PATHOLOGICAL FRACTURE: ICD-10-CM

## 2025-07-30 DIAGNOSIS — E23.0 HYPOPITUITARISM (MULTI): ICD-10-CM

## 2025-07-30 RX ORDER — TESTOSTERONE GEL, 1% 10 MG/G
GEL TRANSDERMAL
Qty: 90 PACKET | Refills: 1 | Status: SHIPPED | OUTPATIENT
Start: 2025-07-30

## 2025-08-13 ENCOUNTER — HOSPITAL ENCOUNTER (OUTPATIENT)
Dept: RADIOLOGY | Facility: HOSPITAL | Age: 85
Discharge: HOME | End: 2025-08-13
Payer: MEDICARE

## 2025-08-13 DIAGNOSIS — Z15.09 BRCA2 POSITIVE: ICD-10-CM

## 2025-08-13 DIAGNOSIS — Z15.01 BRCA2 POSITIVE: ICD-10-CM

## 2025-08-13 PROCEDURE — 6100000002 MR PANCREAS SCREENING SELF PAY

## 2025-08-15 ENCOUNTER — APPOINTMENT (OUTPATIENT)
Dept: RADIOLOGY | Facility: CLINIC | Age: 85
End: 2025-08-15
Payer: MEDICARE

## 2025-08-21 DIAGNOSIS — E23.0 HYPOPITUITARISM (MULTI): ICD-10-CM

## 2025-08-26 ENCOUNTER — TELEPHONE (OUTPATIENT)
Dept: PRIMARY CARE | Facility: CLINIC | Age: 85
End: 2025-08-26
Payer: MEDICARE

## 2025-10-06 ENCOUNTER — APPOINTMENT (OUTPATIENT)
Dept: OPHTHALMOLOGY | Facility: CLINIC | Age: 85
End: 2025-10-06
Payer: MEDICARE

## 2025-10-07 ENCOUNTER — APPOINTMENT (OUTPATIENT)
Dept: ENDOCRINOLOGY | Facility: CLINIC | Age: 85
End: 2025-10-07
Payer: MEDICARE

## 2025-10-27 ENCOUNTER — APPOINTMENT (OUTPATIENT)
Dept: OPHTHALMOLOGY | Facility: CLINIC | Age: 85
End: 2025-10-27
Payer: MEDICARE

## 2025-11-07 ENCOUNTER — APPOINTMENT (OUTPATIENT)
Dept: RHEUMATOLOGY | Facility: CLINIC | Age: 85
End: 2025-11-07
Payer: MEDICARE

## 2026-01-19 ENCOUNTER — APPOINTMENT (OUTPATIENT)
Dept: INFUSION THERAPY | Facility: CLINIC | Age: 86
End: 2026-01-19
Payer: MEDICARE

## (undated) DEVICE — PAD, GROUNDING, ELECTROSURGICAL, W/9 FT CABLE, POLYHESIVE II, ADULT, LF

## (undated) DEVICE — Device

## (undated) DEVICE — TRACKER, INSTRUMENT

## (undated) DEVICE — DRAPE, FLUID WARMER

## (undated) DEVICE — DRAPE, INSTRUMENT, W/POUCH, STERI DRAPE, 7 X 11 IN, DISPOSABLE, STERILE

## (undated) DEVICE — CATHETER TRAY, SURESTEP, 16FR, URINE METER W/STATLOCK

## (undated) DEVICE — ELECTRODE, ELECTROSURGICAL, COAGULATOR, W/SUCTION, HANDSWITCHING, 8 FR, 6 IN

## (undated) DEVICE — DRESSING, NASOPORE, 8CM FIRM

## (undated) DEVICE — TUBE, SALEM SUMP, 16 FR X 48IN, ENFIT

## (undated) DEVICE — SEALANT, HEMOSTATIC, FLOSEAL, 10 ML

## (undated) DEVICE — BLADE, SHAVER, TRICUT, STRAIGHT SHOT MAGNUM, 4 MM, STERILE

## (undated) DEVICE — SPONGE, HEMOSTATIC, GELATIN, SURGIFOAM, 8 X 12.5 CM X 10 MM

## (undated) DEVICE — REST, HEAD, BAGEL, 9 IN

## (undated) DEVICE — CONTAINER, SPECIMEN, 120 ML, STERILE

## (undated) DEVICE — COVER, CART, 45 X 27 X 48 IN, CLEAR

## (undated) DEVICE — COVER, TABLE, 44 X 75 IN, DISPOSABLE, LF, STERILE

## (undated) DEVICE — BUR, TAPERED DIAMOND 15DEGREE

## (undated) DEVICE — SPONGE GAUZE, XRAY SC+RFID, 4X4 16 PLY, STERILE

## (undated) DEVICE — MANIFOLD, 4 PORT NEPTUNE STANDARD